# Patient Record
Sex: FEMALE | Race: BLACK OR AFRICAN AMERICAN | NOT HISPANIC OR LATINO | Employment: FULL TIME | URBAN - METROPOLITAN AREA
[De-identification: names, ages, dates, MRNs, and addresses within clinical notes are randomized per-mention and may not be internally consistent; named-entity substitution may affect disease eponyms.]

---

## 2019-10-02 ENCOUNTER — OFFICE VISIT (OUTPATIENT)
Dept: FAMILY MEDICINE CLINIC | Facility: CLINIC | Age: 59
End: 2019-10-02

## 2019-10-02 VITALS
HEIGHT: 59 IN | OXYGEN SATURATION: 99 % | BODY MASS INDEX: 39.43 KG/M2 | WEIGHT: 195.6 LBS | SYSTOLIC BLOOD PRESSURE: 138 MMHG | DIASTOLIC BLOOD PRESSURE: 78 MMHG | TEMPERATURE: 97.8 F | HEART RATE: 64 BPM

## 2019-10-02 DIAGNOSIS — E11.9 TYPE 2 DIABETES MELLITUS WITHOUT COMPLICATION, WITHOUT LONG-TERM CURRENT USE OF INSULIN (HCC): Primary | ICD-10-CM

## 2019-10-02 DIAGNOSIS — E78.5 HYPERLIPIDEMIA, UNSPECIFIED HYPERLIPIDEMIA TYPE: ICD-10-CM

## 2019-10-02 DIAGNOSIS — E66.9 CLASS 2 OBESITY WITHOUT SERIOUS COMORBIDITY WITH BODY MASS INDEX (BMI) OF 39.0 TO 39.9 IN ADULT, UNSPECIFIED OBESITY TYPE: ICD-10-CM

## 2019-10-02 DIAGNOSIS — Z12.31 VISIT FOR SCREENING MAMMOGRAM: ICD-10-CM

## 2019-10-02 DIAGNOSIS — I10 ESSENTIAL HYPERTENSION: ICD-10-CM

## 2019-10-02 PROCEDURE — 99214 OFFICE O/P EST MOD 30 MIN: CPT | Performed by: INTERNAL MEDICINE

## 2019-10-02 RX ORDER — METOPROLOL TARTRATE 50 MG/1
50 TABLET, FILM COATED ORAL 2 TIMES DAILY
Refills: 3 | COMMUNITY
Start: 2019-08-19 | End: 2019-11-21 | Stop reason: SDUPTHER

## 2019-10-02 NOTE — PROGRESS NOTES
Subjective   Kimberli Magallanes is a 58 y.o. female.     Chief Complaint   Patient presents with   • Diabetes   • Hypertension       History of Present Illness   Here for follow-up of diabetes.  Patient states to have been compliant with medications.  Blood sugar monitoring - patient states has been running on average 130.  With a range of 90 - 141.  No episodes of hypoglycemia, nausea, vomiting, new rashes, syncope or other issues.  Denies any difficulties with the current medication regimen of no medications and off the toujeo for 3 months without difficulty.  Follow-up for cholesterol.  Currently has been feeling well without any myalgias, muscle aches, weakness, numbness, chest pain, short of breath or other issues.  Currently is adherent with medication regimen and denies medication side effects. Is due for lab follow-up.  Follow-up for hypertension.  Currently has been feeling well and asymptomatic without any headaches,vision changes, cough, chest pain, shortness of breath, swelling, focal neurologic deficit, memory loss or syncope.  Has been taking the medications regularly and adherent with the regimen, Denies medication side effects and no significant interval events.    No GERD symptoms and no nexium in over 3 weeks.    The following portions of the patient's history were reviewed and updated as appropriate: allergies, current medications, past family history, past medical history, past social history, past surgical history and problem list.    Past Medical History:   Diagnosis Date   • Abnormal electrocardiogram    • Adverse reaction to ACE inhibitor drug    • Adverse reaction to angiotensin 2 receptor antagonist    • BMI 40.0-44.9, adult (CMS/[facility])    • Encounter for annual health examination    • Encounter for hepatitis C screening test for low risk patient    • Esophageal reflux    • Foot deformity, acquired, right    • Glucosuria    • Hematuria, microscopic    • Hyperlipidemia    • Hypertension    • Refused  influenza vaccine    • Visit for screening mammogram        Past Surgical History:   Procedure Laterality Date   • BREAST LUMPECTOMY Left 2019    benign calcification   • COLONOSCOPY      16 normal, repeat in 10 years       Family History   Problem Relation Age of Onset   • Heart attack Mother    • Stroke Mother    • Hypertension Mother    • Diabetes Father    • Hypertension Father    • No Known Problems Other        Social History     Socioeconomic History   • Marital status: Single     Spouse name: Not on file   • Number of children: Not on file   • Years of education: Not on file   • Highest education level: Not on file   Tobacco Use   • Smoking status: Former Smoker     Last attempt to quit: 2012     Years since quittin.7   • Smokeless tobacco: Never Used   • Tobacco comment: None since    Substance and Sexual Activity   • Alcohol use: Yes     Frequency: Monthly or less     Drinks per session: 1 or 2     Binge frequency: Less than monthly   • Drug use: No   • Sexual activity: Defer       Review of Systems   Constitutional: Negative for activity change, appetite change, fatigue, fever, unexpected weight gain and unexpected weight loss.   HENT: Negative for nosebleeds, rhinorrhea, trouble swallowing and voice change.    Eyes: Negative for visual disturbance.   Respiratory: Negative for cough, chest tightness, shortness of breath and wheezing.    Cardiovascular: Negative for chest pain, palpitations and leg swelling.   Gastrointestinal: Negative for abdominal pain, blood in stool, constipation, diarrhea, nausea, vomiting, GERD and indigestion.   Genitourinary: Negative for dysuria, frequency and hematuria.   Musculoskeletal: Negative for arthralgias, back pain and myalgias.   Skin: Negative for rash and bruise.   Neurological: Negative for dizziness, tremors, weakness, light-headedness, numbness, headache and memory problem.   Hematological: Negative for adenopathy. Does not bruise/bleed easily.    Psychiatric/Behavioral: Negative for sleep disturbance and depressed mood. The patient is not nervous/anxious.        Objective   Vitals:    10/02/19 1639   BP: 138/78   Pulse: 64   Temp: 97.8 °F (36.6 °C)   SpO2: 99%     Body mass index is 39.51 kg/m².  Physical Exam   Constitutional: She is oriented to person, place, and time. She appears well-developed and well-nourished. No distress.   HENT:   Head: Normocephalic and atraumatic.   Right Ear: External ear normal.   Left Ear: External ear normal.   Nose: Nose normal.   Mouth/Throat: Oropharynx is clear and moist.   Eyes: Conjunctivae and EOM are normal. Pupils are equal, round, and reactive to light.   Neck: Normal range of motion. Neck supple. No tracheal deviation present. No thyromegaly present.   Cardiovascular: Normal rate, regular rhythm, normal heart sounds and intact distal pulses. Exam reveals no gallop and no friction rub.   No murmur heard.  Pulmonary/Chest: Effort normal and breath sounds normal. No respiratory distress.   Abdominal: Soft. Bowel sounds are normal. She exhibits no mass. There is no tenderness. There is no guarding.   Musculoskeletal: Normal range of motion. She exhibits no edema.   Lymphadenopathy:     She has no cervical adenopathy.   Neurological: She is alert and oriented to person, place, and time. She displays normal reflexes. She exhibits normal muscle tone.   Skin: Skin is warm and dry. Capillary refill takes less than 2 seconds. No rash noted. She is not diaphoretic.   Psychiatric: She has a normal mood and affect. Her behavior is normal. Judgment and thought content normal.   Nursing note and vitals reviewed.      No results found for this or any previous visit (from the past 2016 hour(s)).  Assessment/Plan   Kimberli was seen today for diabetes and hypertension.    Diagnoses and all orders for this visit:    Type 2 diabetes mellitus without complication, without long-term current use of insulin (CMS/[facility])  -     Mammo Screening  Bilateral With CAD  -     Comprehensive Metabolic Panel  -     Hemoglobin A1c  -     Microalbumin / Creatinine Urine Ratio - Urine, Clean Catch    Essential hypertension  -     Mammo Screening Bilateral With CAD  -     Comprehensive Metabolic Panel    Hyperlipidemia, unspecified hyperlipidemia type  -     Mammo Screening Bilateral With CAD  -     Comprehensive Metabolic Panel    Visit for screening mammogram  -     Mammo Screening Bilateral With CAD    Class 2 obesity without serious comorbidity with body mass index (BMI) of 39.0 to 39.9 in adult, unspecified obesity type      Reviewed history and medications which are very few.  She seems to be doing quite well with her home blood sugars at this time.  We will await her test results to determine if she needs to continue with the current no regimen or if we need to add any oral medications.  Patient is agreeable to having a mammogram screening repeat post lumpectomy with a benign calcification of the left breast.  Encourage diet exercise and weight loss.

## 2019-10-02 NOTE — PATIENT INSTRUCTIONS
Exercising to Lose Weight  Exercise is structured, repetitive physical activity to improve fitness and health. Getting regular exercise is important for everyone. It is especially important if you are overweight. Being overweight increases your risk of heart disease, stroke, diabetes, high blood pressure, and several types of cancer. Reducing your calorie intake and exercising can help you lose weight.  Exercise is usually categorized as moderate or vigorous intensity. To lose weight, most people need to do a certain amount of moderate-intensity or vigorous-intensity exercise each week.  Moderate-intensity exercise    Moderate-intensity exercise is any activity that gets you moving enough to burn at least three times more energy (calories) than if you were sitting.  Examples of moderate exercise include:  · Walking a mile in 15 minutes.  · Doing light yard work.  · Biking at an easy pace.  Most people should get at least 150 minutes (2 hours and 30 minutes) a week of moderate-intensity exercise to maintain their body weight.  Vigorous-intensity exercise  Vigorous-intensity exercise is any activity that gets you moving enough to burn at least six times more calories than if you were sitting. When you exercise at this intensity, you should be working hard enough that you are not able to carry on a conversation.  Examples of vigorous exercise include:  · Running.  · Playing a team sport, such as football, basketball, and soccer.  · Jumping rope.  Most people should get at least 75 minutes (1 hour and 15 minutes) a week of vigorous-intensity exercise to maintain their body weight.  How can exercise affect me?  When you exercise enough to burn more calories than you eat, you lose weight. Exercise also reduces body fat and builds muscle. The more muscle you have, the more calories you burn. Exercise also:  · Improves mood.  · Reduces stress and tension.  · Improves your overall fitness, flexibility, and  endurance.  · Increases bone strength.  The amount of exercise you need to lose weight depends on:  · Your age.  · The type of exercise.  · Any health conditions you have.  · Your overall physical ability.  Talk to your health care provider about how much exercise you need and what types of activities are safe for you.  What actions can I take to lose weight?  Nutrition    · Make changes to your diet as told by your health care provider or diet and nutrition specialist (dietitian). This may include:  ? Eating fewer calories.  ? Eating more protein.  ? Eating less unhealthy fats.  ? Eating a diet that includes fresh fruits and vegetables, whole grains, low-fat dairy products, and lean protein.  ? Avoiding foods with added fat, salt, and sugar.  · Drink plenty of water while you exercise to prevent dehydration or heat stroke.  Activity  · Choose an activity that you enjoy and set realistic goals. Your health care provider can help you make an exercise plan that works for you.  · Exercise at a moderate or vigorous intensity most days of the week.  ? The intensity of exercise may vary from person to person. You can tell how intense a workout is for you by paying attention to your breathing and heartbeat. Most people will notice their breathing and heartbeat get faster with more intense exercise.  · Do resistance training twice each week, such as:  ? Push-ups.  ? Sit-ups.  ? Lifting weights.  ? Using resistance bands.  · Getting short amounts of exercise can be just as helpful as long structured periods of exercise. If you have trouble finding time to exercise, try to include exercise in your daily routine.  ? Get up, stretch, and walk around every 30 minutes throughout the day.  ? Go for a walk during your lunch break.  ? Park your car farther away from your destination.  ? If you take public transportation, get off one stop early and walk the rest of the way.  ? Make phone calls while standing up and walking  around.  ? Take the stairs instead of elevators or escalators.  · Wear comfortable clothes and shoes with good support.  · Do not exercise so much that you hurt yourself, feel dizzy, or get very short of breath.  Where to find more information  · [place] Department of Health and Human Services: www.hhs.gov  · Centers for Disease Control and Prevention (CDC): www.cdc.gov  Contact a health care provider:  · Before starting a new exercise program.  · If you have questions or concerns about your weight.  · If you have a medical problem that keeps you from exercising.  Get help right away if you have any of the following while exercising:  · Injury.  · Dizziness.  · Difficulty breathing or shortness of breath that does not go away when you stop exercising.  · Chest pain.  · Rapid heartbeat.  Summary  · Being overweight increases your risk of heart disease, stroke, diabetes, high blood pressure, and several types of cancer.  · Losing weight happens when you burn more calories than you eat.  · Reducing the amount of calories you eat in addition to getting regular moderate or vigorous exercise each week helps you lose weight.  This information is not intended to replace advice given to you by your health care provider. Make sure you discuss any questions you have with your health care provider.  Document Released: 01/20/2012 Document Revised: 12/31/2018 Document Reviewed: 12/31/2018  [employer] Interactive Patient Education © 2019 [employer]      Calorie Counting for Weight Loss  Calories are units of energy. Your body needs a certain amount of calories from food to keep you going throughout the day. When you eat more calories than your body needs, your body stores the extra calories as fat. When you eat fewer calories than your body needs, your body burns fat to get the energy it needs.  Calorie counting means keeping track of how many calories you eat and drink each day. Calorie counting can be helpful if you need to lose  weight. If you make sure to eat fewer calories than your body needs, you should lose weight. Ask your health care provider what a healthy weight is for you.  For calorie counting to work, you will need to eat the right number of calories in a day in order to lose a healthy amount of weight per week. A dietitian can help you determine how many calories you need in a day and will give you suggestions on how to reach your calorie goal.  · A healthy amount of weight to lose per week is usually 1-2 lb (0.5-0.9 kg). This usually means that your daily calorie intake should be reduced by 500-750 calories.  · Eating 1,200 - 1,500 calories per day can help most women lose weight.  · Eating 1,500 - 1,800 calories per day can help most men lose weight.  What is my plan?  My goal is to have __________ calories per day.  If I have this many calories per day, I should lose around __________ pounds per week.  What do I need to know about calorie counting?  In order to meet your daily calorie goal, you will need to:  · Find out how many calories are in each food you would like to eat. Try to do this before you eat.  · Decide how much of the food you plan to eat.  · Write down what you ate and how many calories it had. Doing this is called keeping a food log.  To successfully lose weight, it is important to balance calorie counting with a healthy lifestyle that includes regular activity. Aim for 150 minutes of moderate exercise (such as walking) or 75 minutes of vigorous exercise (such as running) each week.  Where do I find calorie information?    The number of calories in a food can be found on a Nutrition Facts label. If a food does not have a Nutrition Facts label, try to look up the calories online or ask your dietitian for help.  Remember that calories are listed per serving. If you choose to have more than one serving of a food, you will have to multiply the calories per serving by the amount of servings you plan to eat. For  "example, the label on a package of bread might say that a serving size is 1 slice and that there are 90 calories in a serving. If you eat 1 slice, you will have eaten 90 calories. If you eat 2 slices, you will have eaten 180 calories.  How do I keep a food log?  Immediately after each meal, record the following information in your food log:  · What you ate. Don't forget to include toppings, sauces, and other extras on the food.  · How much you ate. This can be measured in cups, ounces, or number of items.  · How many calories each food and drink had.  · The total number of calories in the meal.  Keep your food log near you, such as in a small notebook in your pocket, or use a mobile spencer or website. Some programs will calculate calories for you and show you how many calories you have left for the day to meet your goal.  What are some calorie counting tips?    · Use your calories on foods and drinks that will fill you up and not leave you hungry:  ? Some examples of foods that fill you up are nuts and nut butters, vegetables, lean proteins, and high-fiber foods like whole grains. High-fiber foods are foods with more than 5 g fiber per serving.  ? Drinks such as sodas, specialty coffee drinks, alcohol, and juices have a lot of calories, yet do not fill you up.  · Eat nutritious foods and avoid empty calories. Empty calories are calories you get from foods or beverages that do not have many vitamins or protein, such as candy, sweets, and soda. It is better to have a nutritious high-calorie food (such as an avocado) than a food with few nutrients (such as a bag of chips).  · Know how many calories are in the foods you eat most often. This will help you calculate calorie counts faster.  · Pay attention to calories in drinks. Low-calorie drinks include water and unsweetened drinks.  · Pay attention to nutrition labels for \"low fat\" or \"fat free\" foods. These foods sometimes have the same amount of calories or more calories " than the full fat versions. They also often have added sugar, starch, or salt, to make up for flavor that was removed with the fat.  · Find a way of tracking calories that works for you. Get creative. Try different apps or programs if writing down calories does not work for you.  What are some portion control tips?  · Know how many calories are in a serving. This will help you know how many servings of a certain food you can have.  · Use a measuring cup to measure serving sizes. You could also try weighing out portions on a kitchen scale. With time, you will be able to estimate serving sizes for some foods.  · Take some time to put servings of different foods on your favorite plates, bowls, and cups so you know what a serving looks like.  · Try not to eat straight from a bag or box. Doing this can lead to overeating. Put the amount you would like to eat in a cup or on a plate to make sure you are eating the right portion.  · Use smaller plates, glasses, and bowls to prevent overeating.  · Try not to multitask (for example, watch TV or use your computer) while eating. If it is time to eat, sit down at a table and enjoy your food. This will help you to know when you are full. It will also help you to be aware of what you are eating and how much you are eating.  What are tips for following this plan?  Reading food labels  · Check the calorie count compared to the serving size. The serving size may be smaller than what you are used to eating.  · Check the source of the calories. Make sure the food you are eating is high in vitamins and protein and low in saturated and trans fats.  Shopping  · Read nutrition labels while you shop. This will help you make healthy decisions before you decide to purchase your food.  · Make a grocery list and stick to it.  Cooking  · Try to cook your favorite foods in a healthier way. For example, try baking instead of frying.  · Use low-fat dairy products.  Meal planning  · Use more fruits  and vegetables. Half of your plate should be fruits and vegetables.  · Include lean proteins like poultry and fish.  How do I count calories when eating out?  · Ask for smaller portion sizes.  · Consider sharing an entree and sides instead of getting your own entree.  · If you get your own entree, eat only half. Ask for a box at the beginning of your meal and put the rest of your entree in it so you are not tempted to eat it.  · If calories are listed on the menu, choose the lower calorie options.  · Choose dishes that include vegetables, fruits, whole grains, low-fat dairy products, and lean protein.  · Choose items that are boiled, broiled, grilled, or steamed. Stay away from items that are buttered, battered, fried, or served with cream sauce. Items labeled “crispy” are usually fried, unless stated otherwise.  · Choose water, low-fat milk, unsweetened iced tea, or other drinks without added sugar. If you want an alcoholic beverage, choose a lower calorie option such as a glass of wine or light beer.  · Ask for dressings, sauces, and syrups on the side. These are usually high in calories, so you should limit the amount you eat.  · If you want a salad, choose a garden salad and ask for grilled meats. Avoid extra toppings like ding, cheese, or fried items. Ask for the dressing on the side, or ask for olive oil and vinegar or lemon to use as dressing.  · Estimate how many servings of a food you are given. For example, a serving of cooked rice is ½ cup or about the size of half a baseball. Knowing serving sizes will help you be aware of how much food you are eating at restaurants. The list below tells you how big or small some common portion sizes are based on everyday objects:  ? 1 oz--4 stacked dice.  ? 3 oz--1 deck of cards.  ? 1 tsp--1 die.  ? 1 Tbsp--½ a ping-pong ball.  ? 2 Tbsp--1 ping-pong ball.  ? ½ cup--½ baseball.  ? 1 cup--1 baseball.  Summary  · Calorie counting means keeping track of how many calories  you eat and drink each day. If you eat fewer calories than your body needs, you should lose weight.  · A healthy amount of weight to lose per week is usually 1-2 lb (0.5-0.9 kg). This usually means reducing your daily calorie intake by 500-750 calories.  · The number of calories in a food can be found on a Nutrition Facts label. If a food does not have a Nutrition Facts label, try to look up the calories online or ask your dietitian for help.  · Use your calories on foods and drinks that will fill you up, and not on foods and drinks that will leave you hungry.  · Use smaller plates, glasses, and bowls to prevent overeating.  This information is not intended to replace advice given to you by your health care provider. Make sure you discuss any questions you have with your health care provider.  Document Released: 12/18/2006 Document Revised: 11/17/2017 Document Reviewed: 11/17/2017  [employer] Interactive Patient Education © 2019 [employer]

## 2019-10-08 LAB
ALBUMIN SERPL-MCNC: 4.8 G/DL (ref 3.5–5.2)
ALBUMIN/CREAT UR: 41.6 MG/G CREAT (ref 0–30)
ALBUMIN/GLOB SERPL: 1.8 G/DL
ALP SERPL-CCNC: 85 U/L (ref 39–117)
ALT SERPL-CCNC: 12 U/L (ref 1–33)
AST SERPL-CCNC: 15 U/L (ref 1–32)
BILIRUB SERPL-MCNC: 0.2 MG/DL (ref 0.2–1.2)
BUN SERPL-MCNC: 11 MG/DL (ref 6–20)
BUN/CREAT SERPL: 13.6 (ref 7–25)
CALCIUM SERPL-MCNC: 9.9 MG/DL (ref 8.6–10.5)
CHLORIDE SERPL-SCNC: 103 MMOL/L (ref 98–107)
CO2 SERPL-SCNC: 26.8 MMOL/L (ref 22–29)
CREAT SERPL-MCNC: 0.81 MG/DL (ref 0.57–1)
CREAT UR-MCNC: 29.6 MG/DL
GLOBULIN SER CALC-MCNC: 2.7 GM/DL
GLUCOSE SERPL-MCNC: 117 MG/DL (ref 65–99)
HBA1C MFR BLD: 6.9 % (ref 4.8–5.6)
MICROALBUMIN UR-MCNC: 12.3 UG/ML
POTASSIUM SERPL-SCNC: 4.2 MMOL/L (ref 3.5–5.2)
PROT SERPL-MCNC: 7.5 G/DL (ref 6–8.5)
SODIUM SERPL-SCNC: 143 MMOL/L (ref 136–145)

## 2019-11-21 DIAGNOSIS — I10 ESSENTIAL HYPERTENSION: Primary | ICD-10-CM

## 2019-11-21 RX ORDER — METOPROLOL TARTRATE 50 MG/1
50 TABLET, FILM COATED ORAL 2 TIMES DAILY
Qty: 90 TABLET | Refills: 0 | Status: SHIPPED | OUTPATIENT
Start: 2019-11-21 | End: 2020-01-21 | Stop reason: ALTCHOICE

## 2019-11-21 RX ORDER — METOPROLOL TARTRATE 50 MG/1
TABLET, FILM COATED ORAL
COMMUNITY
Start: 2019-10-01 | End: 2020-01-21 | Stop reason: ALTCHOICE

## 2020-01-21 ENCOUNTER — OFFICE VISIT (OUTPATIENT)
Dept: FAMILY MEDICINE CLINIC | Facility: CLINIC | Age: 60
End: 2020-01-21

## 2020-01-21 VITALS
DIASTOLIC BLOOD PRESSURE: 82 MMHG | HEART RATE: 64 BPM | HEIGHT: 59 IN | OXYGEN SATURATION: 98 % | BODY MASS INDEX: 38.91 KG/M2 | SYSTOLIC BLOOD PRESSURE: 134 MMHG | WEIGHT: 193 LBS

## 2020-01-21 DIAGNOSIS — I10 ESSENTIAL HYPERTENSION: ICD-10-CM

## 2020-01-21 DIAGNOSIS — Z09 HOSPITAL DISCHARGE FOLLOW-UP: Primary | ICD-10-CM

## 2020-01-21 DIAGNOSIS — Z98.61 S/P PTCA (PERCUTANEOUS TRANSLUMINAL CORONARY ANGIOPLASTY): ICD-10-CM

## 2020-01-21 DIAGNOSIS — I21.4 NSTEMI (NON-ST ELEVATED MYOCARDIAL INFARCTION) (HCC): ICD-10-CM

## 2020-01-21 DIAGNOSIS — K21.9 GASTROESOPHAGEAL REFLUX DISEASE WITHOUT ESOPHAGITIS: ICD-10-CM

## 2020-01-21 DIAGNOSIS — E78.5 HYPERLIPIDEMIA, UNSPECIFIED HYPERLIPIDEMIA TYPE: ICD-10-CM

## 2020-01-21 PROBLEM — R07.89 ATYPICAL CHEST PAIN: Status: ACTIVE | Noted: 2020-01-15

## 2020-01-21 PROCEDURE — 99214 OFFICE O/P EST MOD 30 MIN: CPT | Performed by: INTERNAL MEDICINE

## 2020-01-21 RX ORDER — ATORVASTATIN CALCIUM 40 MG/1
40 TABLET, FILM COATED ORAL DAILY
COMMUNITY
Start: 2020-01-16 | End: 2020-04-07 | Stop reason: SDUPTHER

## 2020-01-21 RX ORDER — NITROGLYCERIN 0.4 MG/1
0.4 TABLET SUBLINGUAL
COMMUNITY
Start: 2020-01-16 | End: 2020-04-07 | Stop reason: SDUPTHER

## 2020-01-21 RX ORDER — PRASUGREL 10 MG/1
10 TABLET, FILM COATED ORAL DAILY
COMMUNITY
Start: 2020-01-16 | End: 2020-04-07 | Stop reason: SDUPTHER

## 2020-01-21 RX ORDER — ASPIRIN 81 MG
81 TABLET,CHEWABLE ORAL DAILY
COMMUNITY
Start: 2020-01-16 | End: 2020-04-07 | Stop reason: SDUPTHER

## 2020-01-21 RX ORDER — AMLODIPINE BESYLATE 5 MG/1
5 TABLET ORAL DAILY
COMMUNITY
Start: 2020-01-16 | End: 2020-04-07 | Stop reason: SDUPTHER

## 2020-01-21 NOTE — PROGRESS NOTES
"Subjective   Kimberli Magallanes is a 59 y.o. female.     Chief Complaint   Patient presents with   • stent     hospital f/u       History of Present Illness   Within 48 business hours after discharge our office contacted her via telephone to coordinate her care and needs.  No flowsheet data found.  Risk for Readmission (LACE) No data recorded    Patient was admitted to [facility]  ALL records were obtained and reviewed and /or discussed with admitting physician  Date of admission 1/15/2020  Date of discharge 1/16/2020  Diagnosis NSTEMI, Hypertension, status post PTCA to the proximal first diagonal branch of the LAD x1 on 1/15/2020  Hospital Course: She ruled in for non-STEMI. She was taken to the catheter lab and underwent successful LCIFTON PCI to the proximal first diagonal branch of the LAD ×1. She tolerated the procedure well and without complications. Her medications have been adjusted for better blood pressure control. She is without further chest pain or shortness of breath. She is hemodynamically stable and will be discharged to home today on the medications as listed below. She is on dual antiplatelet therapy with aspirin and Effient. She is also on beta blocker, calcium channel blocker and statin. Follow-up with PCP in 1-2 weeks. She can return to work in 1 week. She should start cardiac rehabilitation in 2 weeks. Follow-up with Dr. Montes at the completion of cardiac rehabilitation in 3 months, sooner if needed.  Patient had difficulty understanding the cardiologist and reasons for the stent and medications then was told by cardiologist to \"just talk to Brandin\" PMD.  Medications upon discharge amlodipine 5 mg daily, aspirin 81 mg daily, atorvastatin 40 mg daily, Effient 10 mg QD for a year, nitroglycerin 0.4 mg PRN chest pain.  Continued metoprolol tartrate.  Disposition Home  Follow up Cardiac rehab and cardiology Dr Montes  Currently c/o feeling \"great\" and following low fat healthy heart diet.  Did " have a very brief episode of chest pain yesterday when under a lot of stress at work.  No further episodes, short of breath, dizziness, weakness, numbness or jaw pain.  Patient has been having indigestion and heartburn with bloating in the abdomen.  Was taking nexium as needed OTC that does help.  Condition stable    I reviewed and requested records labs and diagnostics from [facility] with the patient and family.  The patient is to follow-up with specialist as discussed and directed.  If any problems arise or further questions develop patient is to call or to contact us for any needs.    The following portions of the patient's history were reviewed and updated as appropriate: allergies, current medications, past family history, past medical history, past social history, past surgical history and problem list.    Past Medical History:   Diagnosis Date   • Abnormal electrocardiogram    • Adverse reaction to ACE inhibitor drug    • Adverse reaction to angiotensin 2 receptor antagonist    • BMI 40.0-44.9, adult (CMS/[facility])    • Encounter for annual health examination    • Encounter for hepatitis C screening test for low risk patient    • Esophageal reflux    • Foot deformity, acquired, right    • Glucosuria    • Hematuria, microscopic    • Hyperlipidemia    • Hypertension    • Refused influenza vaccine    • Visit for screening mammogram        Past Surgical History:   Procedure Laterality Date   • BREAST LUMPECTOMY Left 02/14/2019    benign calcification   • COLONOSCOPY      5/27/16 normal, repeat in 10 years       Family History   Problem Relation Age of Onset   • Heart attack Mother    • Stroke Mother    • Hypertension Mother    • Diabetes Father    • Hypertension Father    • No Known Problems Other        Social History     Socioeconomic History   • Marital status: Single     Spouse name: Not on file   • Number of children: Not on file   • Years of education: Not on file   • Highest education level: Not on file    Tobacco Use   • Smoking status: Former Smoker     Last attempt to quit: 2012     Years since quittin.0   • Smokeless tobacco: Never Used   • Tobacco comment: None since    Substance and Sexual Activity   • Alcohol use: Yes     Frequency: Monthly or less     Drinks per session: 1 or 2     Binge frequency: Less than monthly   • Drug use: No   • Sexual activity: Defer       Current Outpatient Medications   Medication Sig Dispense Refill   • amLODIPine (NORVASC) 5 MG tablet 5 mg.     • ASPIRIN LOW DOSE 81 MG chewable tablet 81 mg.     • atorvastatin (LIPITOR) 40 MG tablet 40 mg.     • metoprolol tartrate (LOPRESSOR) 25 MG tablet Take 25 mg by mouth 2 (Two) Times a Day.     • nitroglycerin (NITROSTAT) 0.4 MG SL tablet 0.4 mg.     • prasugrel (EFFIENT) 10 MG tablet 10 mg.     • esomeprazole (nexIUM 24HR) 20 MG capsule Take 1 capsule by mouth Every Morning Before Breakfast. 30 capsule 6     No current facility-administered medications for this visit.        Review of Systems   Constitutional: Negative for activity change, appetite change, fatigue, fever, unexpected weight gain and unexpected weight loss.   HENT: Negative for nosebleeds, rhinorrhea, trouble swallowing and voice change.    Eyes: Negative for visual disturbance.   Respiratory: Negative for cough, chest tightness, shortness of breath and wheezing.    Cardiovascular: Negative for chest pain, palpitations and leg swelling.   Gastrointestinal: Negative for abdominal pain, blood in stool, constipation, diarrhea, nausea, vomiting, GERD and indigestion.   Genitourinary: Negative for dysuria, frequency and hematuria.   Musculoskeletal: Negative for arthralgias, back pain and myalgias.   Skin: Negative for rash and bruise.   Neurological: Negative for dizziness, tremors, weakness, light-headedness, numbness, headache and memory problem.   Hematological: Negative for adenopathy. Does not bruise/bleed easily.   Psychiatric/Behavioral: Negative for sleep  disturbance and depressed mood. The patient is not nervous/anxious.        Objective   Vitals:    01/21/20 1046   BP: 134/82   Pulse: 64   SpO2: 98%     Body mass index is 38.96 kg/m².  Physical Exam   Constitutional: She is oriented to person, place, and time. She appears well-developed and well-nourished. No distress.   HENT:   Head: Normocephalic and atraumatic.   Right Ear: External ear normal.   Left Ear: External ear normal.   Nose: Nose normal.   Mouth/Throat: Oropharynx is clear and moist.   Eyes: Pupils are equal, round, and reactive to light. Conjunctivae and EOM are normal.   Neck: Normal range of motion. Neck supple. No tracheal deviation present. No thyromegaly present.   Cardiovascular: Normal rate, regular rhythm, normal heart sounds and intact distal pulses. Exam reveals no gallop and no friction rub.   No murmur heard.  Pulmonary/Chest: Effort normal and breath sounds normal. No respiratory distress.   Abdominal: Soft. Bowel sounds are normal. She exhibits no mass. There is no tenderness. There is no guarding.   Musculoskeletal: Normal range of motion. She exhibits no edema.    Kimberli had a diabetic foot exam performed today.   During the foot exam she had a monofilament test performed (normal).  Vascular Status -  Her right foot exhibits normal foot vasculature  and no edema. Her left foot exhibits normal foot vasculature  and no edema.  Skin Integrity  -  Her right foot skin is intact.Her left foot skin is intact..  Lymphadenopathy:     She has no cervical adenopathy.   Neurological: She is alert and oriented to person, place, and time. She displays normal reflexes. She exhibits normal muscle tone.   Skin: Skin is warm and dry. Capillary refill takes less than 2 seconds. No rash noted. She is not diaphoretic.   Psychiatric: She has a normal mood and affect. Her behavior is normal. Judgment and thought content normal.   Nursing note and vitals reviewed.        Assessment/Plan   Kimberli was seen  today for stent.    Diagnoses and all orders for this visit:    Hospital discharge follow-up    NSTEMI (non-ST elevated myocardial infarction) (CMS/[facility])    S/P PTCA (percutaneous transluminal coronary angioplasty)    Essential hypertension    Hyperlipidemia, unspecified hyperlipidemia type    Gastroesophageal reflux disease without esophagitis  -     esomeprazole (nexIUM 24HR) 20 MG capsule; Take 1 capsule by mouth Every Morning Before Breakfast.    Patient to follow up and do the cardiac rehab.  Continue the current medications unchanged at this time.  Patient is unable to take ACE-I or ARBs secondary to past issues.  Stay on the aspirin and effient dual therapy and the atorvastatin and metoprolol.  Will follow up and check the labs in 1-2 months.  Recommend taking the OTC nexium daily.

## 2020-01-29 DIAGNOSIS — Z98.61 S/P PTCA (PERCUTANEOUS TRANSLUMINAL CORONARY ANGIOPLASTY): ICD-10-CM

## 2020-01-29 DIAGNOSIS — I21.4 NSTEMI (NON-ST ELEVATED MYOCARDIAL INFARCTION) (HCC): Primary | ICD-10-CM

## 2020-02-05 ENCOUNTER — OFFICE VISIT (OUTPATIENT)
Dept: CARDIOLOGY | Facility: CLINIC | Age: 60
End: 2020-02-05

## 2020-02-05 VITALS
SYSTOLIC BLOOD PRESSURE: 118 MMHG | BODY MASS INDEX: 38.91 KG/M2 | WEIGHT: 193 LBS | HEIGHT: 59 IN | HEART RATE: 58 BPM | DIASTOLIC BLOOD PRESSURE: 80 MMHG

## 2020-02-05 DIAGNOSIS — I25.10 CORONARY ARTERY DISEASE INVOLVING NATIVE CORONARY ARTERY OF NATIVE HEART WITHOUT ANGINA PECTORIS: ICD-10-CM

## 2020-02-05 DIAGNOSIS — Z98.61 S/P PTCA (PERCUTANEOUS TRANSLUMINAL CORONARY ANGIOPLASTY): Primary | ICD-10-CM

## 2020-02-05 DIAGNOSIS — I10 ESSENTIAL HYPERTENSION: ICD-10-CM

## 2020-02-05 DIAGNOSIS — E11.59 TYPE 2 DIABETES MELLITUS WITH OTHER CIRCULATORY COMPLICATION, WITHOUT LONG-TERM CURRENT USE OF INSULIN (HCC): ICD-10-CM

## 2020-02-05 PROCEDURE — 99204 OFFICE O/P NEW MOD 45 MIN: CPT | Performed by: INTERNAL MEDICINE

## 2020-02-05 PROCEDURE — 93000 ELECTROCARDIOGRAM COMPLETE: CPT | Performed by: INTERNAL MEDICINE

## 2020-02-05 NOTE — PROGRESS NOTES
Kimberli Magallanes  1960  Date of Office Visit: 02/05/20  Encounter Provider: Lex Morales MD  Place of Service: [facility] CARDIOLOGY      CHIEF COMPLAINT:  Coronary artery disease    HISTORY OF PRESENT ILLNESS:A 59-year-old female with a medical history of coronary artery disease, non-ST elevation MI, diabetes mellitus, mild hyperlipidemia, and hypertension along with angioedema with ACE inhibitor who presents to me as a transfer of care. She was recently, in 01/2020, in the hospital at [facility] with chest discomfort and a NSTEMI. Her troponin peaked based on the last measurement of about 0.7. She was noted to have a very proximal 1st diagonal versus ramus with an 80% stenosis that was stented with an [place] 2.5 x 18 mm drug-eluting stent and postdilated to 2.75. There was mild mid LAD disease, about 30%. The right coronary was nondominant. Her ejection fraction on ventriculogram was normal but she did not have an echocardiogram. She did very well and subsequently has been discharged. Her blood pressure and heart rate are well controlled. She has not started cardiac rehab.        Review of Systems   Constitution: Negative for fever and malaise/fatigue.   HENT: Negative for nosebleeds and sore throat.    Eyes: Negative for blurred vision and double vision.   Cardiovascular: Negative for chest pain, claudication, palpitations and syncope.   Respiratory: Negative for cough, shortness of breath and snoring.    Endocrine: Negative for cold intolerance, heat intolerance and polydipsia.   Skin: Negative for itching, poor wound healing and rash.   Musculoskeletal: Negative for joint pain, joint swelling, muscle weakness and myalgias.   Gastrointestinal: Negative for abdominal pain, melena, nausea and vomiting.   Neurological: Negative for light-headedness, loss of balance, seizures, vertigo and weakness.   Psychiatric/Behavioral: Negative for altered mental status and depression.     "    Past Medical History:   Diagnosis Date   • Abnormal electrocardiogram    • Adverse reaction to ACE inhibitor drug    • Adverse reaction to angiotensin 2 receptor antagonist    • BMI 40.0-44.9, adult (CMS/[facility])    • Diabetes mellitus (CMS/HCC)     Diet Controlled   • Encounter for annual health examination    • Encounter for hepatitis C screening test for low risk patient    • Esophageal reflux    • Foot deformity, acquired, right    • Glucosuria    • Hematuria, microscopic    • Hyperlipidemia    • Hypertension    • Refused influenza vaccine    • Visit for screening mammogram        The following portions of the patient's history were reviewed and updated as appropriate: Social history , Family history and Surgical history     Current Outpatient Medications on File Prior to Visit   Medication Sig Dispense Refill   • amLODIPine (NORVASC) 5 MG tablet Take 5 mg by mouth Daily.     • ASPIRIN LOW DOSE 81 MG chewable tablet Chew 81 mg Daily.     • atorvastatin (LIPITOR) 40 MG tablet Take 40 mg by mouth Daily.     • esomeprazole (nexIUM 24HR) 20 MG capsule Take 1 capsule by mouth Every Morning Before Breakfast. 30 capsule 6   • metoprolol tartrate (LOPRESSOR) 25 MG tablet Take 25 mg by mouth 2 (Two) Times a Day.     • nitroglycerin (NITROSTAT) 0.4 MG SL tablet Place 0.4 mg under the tongue Every 5 (Five) Minutes As Needed.     • prasugrel (EFFIENT) 10 MG tablet Take 10 mg by mouth Daily.       No current facility-administered medications on file prior to visit.        Allergies   Allergen Reactions   • Lisinopril Anaphylaxis and Swelling   • Losartan Anaphylaxis and Swelling   • Citrullus Vulgaris Unknown - Low Severity   • Eggs Or Egg-Derived Products Unknown - Low Severity       Vitals:    02/05/20 1149   BP: 118/80   Pulse: 58   Weight: 87.5 kg (193 lb)   Height: 149.9 cm (59\")     Physical Exam   Constitutional: She is oriented to person, place, and time. She appears well-developed and well-nourished.   HENT:   Head: " Normocephalic and atraumatic.   Eyes: Conjunctivae and EOM are normal. No scleral icterus.   Neck: Normal range of motion. Neck supple. Normal carotid pulses, no hepatojugular reflux and no JVD present. Carotid bruit is not present. No tracheal deviation present. No thyromegaly present.   Cardiovascular: Normal rate and regular rhythm. Exam reveals no gallop and no friction rub.   No murmur heard.  Pulses:       Carotid pulses are 2+ on the right side, and 2+ on the left side.       Radial pulses are 2+ on the right side, and 2+ on the left side.        Femoral pulses are 2+ on the right side, and 2+ on the left side.       Dorsalis pedis pulses are 2+ on the right side, and 2+ on the left side.        Posterior tibial pulses are 2+ on the right side, and 2+ on the left side.   Pulmonary/Chest: Breath sounds normal. No respiratory distress. She has no decreased breath sounds. She has no wheezes. She has no rhonchi. She has no rales. She exhibits no tenderness.   Abdominal: Soft. Bowel sounds are normal. She exhibits no distension. There is no tenderness. There is no rebound.   Musculoskeletal: She exhibits no edema or deformity.   Neurological: She is alert and oriented to person, place, and time. She has normal strength. No sensory deficit.   Skin: No rash noted. No erythema.   Psychiatric: She has a normal mood and affect. Her behavior is normal.       Lab Results   Component Value Date    WBC 11.15 (H) 01/16/2020    HGB 14.9 01/16/2020    HCT 45.8 01/16/2020    MCV 90.7 01/16/2020     01/16/2020       Lab Results   Component Value Date    GLUCOSE 115 (H) 06/09/2014    BUN 14 01/16/2020    CREATININE 0.7 01/16/2020    EGFRIFNONA 73 10/07/2019    EGFRIFAFRI 88 10/07/2019    BCR 20.0 01/16/2020    K 4.0 01/16/2020    CO2 21 (L) 01/16/2020    CALCIUM 9.4 01/16/2020    PROTENTOTREF 7.5 10/07/2019    ALBUMIN 4.4 01/15/2020    LABIL2 1.5 01/15/2020    AST 22 01/15/2020    ALT 19 01/15/2020       Lab Results    Component Value Date    GLUCOSE 115 (H) 06/09/2014    CALCIUM 9.4 01/16/2020     01/16/2020    K 4.0 01/16/2020    CO2 21 (L) 01/16/2020     (H) 01/16/2020    BUN 14 01/16/2020    CREATININE 0.7 01/16/2020    EGFRIFAFRI 88 10/07/2019    EGFRIFNONA 73 10/07/2019    BCR 20.0 01/16/2020       Lab Results   Component Value Date    CHLPL 154 01/16/2020    TRIG 71 01/16/2020    HDL 50 (L) 01/16/2020    LDL 90 01/16/2020         ECG 12 Lead  Date/Time: 2/5/2020 12:47 PM  Performed by: Lex Morales MD  Authorized by: Lex Morales MD   Comparison: compared with previous ECG from 1/15/2020  Rhythm: sinus rhythm  Rate: normal  QRS axis: normal    Clinical impression: normal ECG                  DISCUSSION/SUMMARY  Very pleasant 59-year-old female with a medical history of recent non-ST elevation MI, coronary artery disease with PCI to the diagonal, hyperlipidemia that is mild, hypertension with prior angioedema with ACE inhibitor presents to me as a transfer of care.    She has been doing very well since her non-ST elevation MI. She does not have chest pain with activity.  Her blood pressure and heart rate are well controlled.    1.  Non-ST elevation MI, recent. Coronary artery disease with PCI to the diagonal.   -  Continue aspirin and prasugrel at this time. I will plan on getting her off of the prasugrel in a year.   -  Continue Lopressor and atorvastatin at the current dose. She does have mild myalgias with the atorvastatin. I told                 her to call me in another week or two if it continues and we will try to move her off of that onto something a little                 more mild.  2.  Essential hypertension, at goal. Continue metoprolol tartrate therapy.  3.  Hyperlipidemia: Continue atorvastatin therapy for now and call me as above if her myalgias do not improve.    I will get her set up with an echocardiogram and plan on touching base with her after that.

## 2020-02-19 ENCOUNTER — HOSPITAL ENCOUNTER (OUTPATIENT)
Dept: CARDIOLOGY | Facility: HOSPITAL | Age: 60
Discharge: HOME | End: 2020-02-19
Admitting: INTERNAL MEDICINE

## 2020-02-19 ENCOUNTER — TELEPHONE (OUTPATIENT)
Dept: CARDIOLOGY | Facility: CLINIC | Age: 60
End: 2020-02-19

## 2020-02-19 ENCOUNTER — OFFICE VISIT (OUTPATIENT)
Dept: CARDIAC REHAB | Facility: HOSPITAL | Age: 60
End: 2020-02-19

## 2020-02-19 VITALS
WEIGHT: 193 LBS | SYSTOLIC BLOOD PRESSURE: 110 MMHG | BODY MASS INDEX: 38.91 KG/M2 | OXYGEN SATURATION: 99 % | HEIGHT: 59 IN | HEART RATE: 68 BPM | DIASTOLIC BLOOD PRESSURE: 80 MMHG

## 2020-02-19 DIAGNOSIS — I25.10 CORONARY ARTERY DISEASE INVOLVING NATIVE CORONARY ARTERY OF NATIVE HEART WITHOUT ANGINA PECTORIS: ICD-10-CM

## 2020-02-19 DIAGNOSIS — I21.4 NSTEMI (NON-ST ELEVATED MYOCARDIAL INFARCTION) (HCC): Primary | ICD-10-CM

## 2020-02-19 DIAGNOSIS — Z95.5 STATUS POST INSERTION OF DRUG ELUTING CORONARY ARTERY STENT: ICD-10-CM

## 2020-02-19 DIAGNOSIS — Z98.61 S/P PTCA (PERCUTANEOUS TRANSLUMINAL CORONARY ANGIOPLASTY): ICD-10-CM

## 2020-02-19 LAB
AORTIC ARCH: 2 CM
AORTIC ROOT ANNULUS: 2 CM
ASCENDING AORTA: 3.2 CM
BH CV ECHO MEAS - ACS: 1.9 CM
BH CV ECHO MEAS - AO MAX PG (FULL): 1.2 MMHG
BH CV ECHO MEAS - AO MAX PG: 5.7 MMHG
BH CV ECHO MEAS - AO MEAN PG (FULL): 1 MMHG
BH CV ECHO MEAS - AO MEAN PG: 3.2 MMHG
BH CV ECHO MEAS - AO ROOT AREA (BSA CORRECTED): 1.6
BH CV ECHO MEAS - AO ROOT AREA: 6.3 CM^2
BH CV ECHO MEAS - AO ROOT DIAM: 2.8 CM
BH CV ECHO MEAS - AO V2 MAX: 119 CM/SEC
BH CV ECHO MEAS - AO V2 MEAN: 85 CM/SEC
BH CV ECHO MEAS - AO V2 VTI: 25.8 CM
BH CV ECHO MEAS - ASC AORTA: 3.2 CM
BH CV ECHO MEAS - AVA(I,A): 2.6 CM^2
BH CV ECHO MEAS - AVA(I,D): 2.6 CM^2
BH CV ECHO MEAS - AVA(V,A): 2.7 CM^2
BH CV ECHO MEAS - AVA(V,D): 2.7 CM^2
BH CV ECHO MEAS - BSA(HAYCOCK): 2 M^2
BH CV ECHO MEAS - BSA: 1.8 M^2
BH CV ECHO MEAS - BZI_BMI: 39 KILOGRAMS/M^2
BH CV ECHO MEAS - BZI_METRIC_HEIGHT: 149.9 CM
BH CV ECHO MEAS - BZI_METRIC_WEIGHT: 87.5 KG
BH CV ECHO MEAS - EDV(MOD-SP2): 31 ML
BH CV ECHO MEAS - EDV(MOD-SP4): 41 ML
BH CV ECHO MEAS - EDV(TEICH): 51 ML
BH CV ECHO MEAS - EF(CUBED): 64.4 %
BH CV ECHO MEAS - EF(MOD-BP): 62 %
BH CV ECHO MEAS - EF(MOD-SP2): 58.1 %
BH CV ECHO MEAS - EF(MOD-SP4): 65.9 %
BH CV ECHO MEAS - EF(TEICH): 57 %
BH CV ECHO MEAS - ESV(MOD-SP2): 13 ML
BH CV ECHO MEAS - ESV(MOD-SP4): 14 ML
BH CV ECHO MEAS - ESV(TEICH): 21.9 ML
BH CV ECHO MEAS - FS: 29.1 %
BH CV ECHO MEAS - IVS/LVPW: 1
BH CV ECHO MEAS - IVSD: 1.3 CM
BH CV ECHO MEAS - LAT PEAK E' VEL: 6 CM/SEC
BH CV ECHO MEAS - LV DIASTOLIC VOL/BSA (35-75): 22.6 ML/M^2
BH CV ECHO MEAS - LV MASS(C)D: 145.6 GRAMS
BH CV ECHO MEAS - LV MASS(C)DI: 80.2 GRAMS/M^2
BH CV ECHO MEAS - LV MAX PG: 4.4 MMHG
BH CV ECHO MEAS - LV MEAN PG: 2.2 MMHG
BH CV ECHO MEAS - LV SYSTOLIC VOL/BSA (12-30): 7.7 ML/M^2
BH CV ECHO MEAS - LV V1 MAX: 105.3 CM/SEC
BH CV ECHO MEAS - LV V1 MEAN: 70.7 CM/SEC
BH CV ECHO MEAS - LV V1 VTI: 22.2 CM
BH CV ECHO MEAS - LVIDD: 3.5 CM
BH CV ECHO MEAS - LVIDS: 2.5 CM
BH CV ECHO MEAS - LVLD AP2: 6 CM
BH CV ECHO MEAS - LVLD AP4: 6.2 CM
BH CV ECHO MEAS - LVLS AP2: 4.8 CM
BH CV ECHO MEAS - LVLS AP4: 4.8 CM
BH CV ECHO MEAS - LVOT AREA (M): 3.1 CM^2
BH CV ECHO MEAS - LVOT AREA: 3 CM^2
BH CV ECHO MEAS - LVOT DIAM: 2 CM
BH CV ECHO MEAS - LVPWD: 1.2 CM
BH CV ECHO MEAS - MED PEAK E' VEL: 5 CM/SEC
BH CV ECHO MEAS - MV A DUR: 0.08 SEC
BH CV ECHO MEAS - MV A MAX VEL: 98 CM/SEC
BH CV ECHO MEAS - MV DEC SLOPE: 408.5 CM/SEC^2
BH CV ECHO MEAS - MV DEC TIME: 0.17 SEC
BH CV ECHO MEAS - MV E MAX VEL: 37.3 CM/SEC
BH CV ECHO MEAS - MV E/A: 0.38
BH CV ECHO MEAS - MV MAX PG: 4.6 MMHG
BH CV ECHO MEAS - MV MEAN PG: 1.7 MMHG
BH CV ECHO MEAS - MV P1/2T MAX VEL: 69.5 CM/SEC
BH CV ECHO MEAS - MV P1/2T: 49.8 MSEC
BH CV ECHO MEAS - MV V2 MAX: 106.9 CM/SEC
BH CV ECHO MEAS - MV V2 MEAN: 61 CM/SEC
BH CV ECHO MEAS - MV V2 VTI: 33.8 CM
BH CV ECHO MEAS - MVA P1/2T LCG: 3.2 CM^2
BH CV ECHO MEAS - MVA(P1/2T): 4.4 CM^2
BH CV ECHO MEAS - MVA(VTI): 2 CM^2
BH CV ECHO MEAS - PA ACC TIME: 0.08 SEC
BH CV ECHO MEAS - PA MAX PG (FULL): 1.3 MMHG
BH CV ECHO MEAS - PA MAX PG: 2.7 MMHG
BH CV ECHO MEAS - PA PR(ACCEL): 43.3 MMHG
BH CV ECHO MEAS - PA V2 MAX: 82.8 CM/SEC
BH CV ECHO MEAS - PULM A REVS DUR: 0.08 SEC
BH CV ECHO MEAS - PULM A REVS VEL: 35.6 CM/SEC
BH CV ECHO MEAS - PULM DIAS VEL: 32.1 CM/SEC
BH CV ECHO MEAS - PULM S/D: 1.7
BH CV ECHO MEAS - PULM SYS VEL: 55.3 CM/SEC
BH CV ECHO MEAS - PVA(V,A): 1.9 CM^2
BH CV ECHO MEAS - PVA(V,D): 1.9 CM^2
BH CV ECHO MEAS - QP/QS: 0.44
BH CV ECHO MEAS - RAP SYSTOLE: 3 MMHG
BH CV ECHO MEAS - RV MAX PG: 1.5 MMHG
BH CV ECHO MEAS - RV MEAN PG: 0.89 MMHG
BH CV ECHO MEAS - RV V1 MAX: 60.8 CM/SEC
BH CV ECHO MEAS - RV V1 MEAN: 44.5 CM/SEC
BH CV ECHO MEAS - RV V1 VTI: 11.2 CM
BH CV ECHO MEAS - RVOT AREA: 2.6 CM^2
BH CV ECHO MEAS - RVOT DIAM: 1.8 CM
BH CV ECHO MEAS - RVSP: 22 MMHG
BH CV ECHO MEAS - SI(AO): 89 ML/M^2
BH CV ECHO MEAS - SI(CUBED): 15.3 ML/M^2
BH CV ECHO MEAS - SI(LVOT): 36.8 ML/M^2
BH CV ECHO MEAS - SI(MOD-SP2): 9.9 ML/M^2
BH CV ECHO MEAS - SI(MOD-SP4): 14.9 ML/M^2
BH CV ECHO MEAS - SI(TEICH): 16 ML/M^2
BH CV ECHO MEAS - SUP REN AO DIAM: 2.3 CM
BH CV ECHO MEAS - SV(AO): 161.6 ML
BH CV ECHO MEAS - SV(CUBED): 27.7 ML
BH CV ECHO MEAS - SV(LVOT): 66.8 ML
BH CV ECHO MEAS - SV(MOD-SP2): 18 ML
BH CV ECHO MEAS - SV(MOD-SP4): 27 ML
BH CV ECHO MEAS - SV(RVOT): 29.4 ML
BH CV ECHO MEAS - SV(TEICH): 29.1 ML
BH CV ECHO MEAS - TAPSE (>1.6): 2.3 CM2
BH CV ECHO MEAS - TR MAX VEL: 215.8 CM/SEC
BH CV ECHO MEASUREMENTS AVERAGE E/E' RATIO: 6.78
BH CV VAS BP RIGHT ARM: NORMAL MMHG
BH CV XLRA - RV BASE: 2.8 CM
BH CV XLRA - RV LENGTH: 6.2 CM
BH CV XLRA - RV MID: 2.3 CM
BH CV XLRA - TDI S': 12 CM/SEC
LEFT ATRIUM VOLUME INDEX: 13 ML/M2
LV EF 2D ECHO EST: 62 %
SINUS: 2.5 CM
STJ: 2.9 CM

## 2020-02-19 PROCEDURE — 93356 MYOCRD STRAIN IMG SPCKL TRCK: CPT | Performed by: INTERNAL MEDICINE

## 2020-02-19 PROCEDURE — 93356 MYOCRD STRAIN IMG SPCKL TRCK: CPT

## 2020-02-19 PROCEDURE — 93306 TTE W/DOPPLER COMPLETE: CPT

## 2020-02-19 PROCEDURE — 93306 TTE W/DOPPLER COMPLETE: CPT | Performed by: INTERNAL MEDICINE

## 2020-02-19 PROCEDURE — 93797 PHYS/QHP OP CAR RHAB WO ECG: CPT

## 2020-02-19 NOTE — PROGRESS NOTES
Cardiac Rehab Initial Assessment      Name: Kimberli Magallanes  :1960 Allergies:Lisinopril; Losartan; Citrullus vulgaris; and Eggs or egg-derived products   MRN: [id number] 59 y.o. Physician: Brandin Khalil MD   Primary Diagnosis:    Diagnosis Plan   1. NSTEMI (non-ST elevated myocardial infarction) (CMS/HCC)     2. Status post insertion of drug eluting coronary artery stent      Event Date: 1/15/20 Specialist: Dr. Morales   Secondary Diagnosis:  Risk Stratification:High Risk Note Author: Angella Fair RN     Cardiovascular History: Comments none     EXERCISE AT HOME  Yes walking 1-2 miles per day  60min- leisurely  2 days per week    EF: 60%      Source: clinical summary 2020          Ambulatory Status:Independent  Ambulatory Fall Risk Assessed on Initial Visit: yes 6 Minute Walk Pre- Cardiac Rehab:  Distance:1018 ft      RPE:2  Max. HR: 89      SPO2: 96-98%    MET: 2.5  MPH: 1.9             RPD: 0  Resting BP: 112/72 LA, 118/68 RA    Peak BP: 132/84  Recovery BP: 122/78  Comments: tolerated well     NUTRITION  Lipids:yes If yes, labs as follows;  Total: 154  HDL:   HDL Cholesterol   Date Value Ref Range Status   2020 50 (L) >50 mg/dL Final    Lipids continued:  LDL:  LDL Cholesterol    Date Value Ref Range Status   2020 90 0 - 100 mg/dL Final     Comment:       LDL Cholesterol Reference Range  <100     Optimal  100-129  Near optimal/above optimal  130-159  Borderline high  160-189  High  >=190    Very high     Triglyceride: 71   Weight Management:                 Weight: 188.8  Height: 59                                   BMI: 38.1  Waist Circumference: 40  inches   Alcohol Use: none Diabetes:Yes,  Monitors BS at home- yes, Frequency: 2 times daily, Random BS: 170    Last HGBA1C with date if applicable: 6.90 10/7/2019         SOCIAL HISTORY  Social History     Socioeconomic History   • Marital status: Single     Spouse name: Not on file   • Number of children: Not on file   • Years of  education: Not on file   • Highest education level: Not on file   Tobacco Use   • Smoking status: Former Smoker     Packs/day: 0.25     Years: 10.00     Pack years: 2.50     Types: Cigarettes     Last attempt to quit: 2012     Years since quittin.1   • Smokeless tobacco: Never Used   • Tobacco comment: None since    Substance and Sexual Activity   • Alcohol use: Yes     Alcohol/week: 0.0 - 2.0 standard drinks     Frequency: Monthly or less     Drinks per session: 1 or 2     Binge frequency: Less than monthly     Comment: social   • Drug use: No   • Sexual activity: Not Currently     Birth control/protection: Post-menopausal       Educational Level (choose one that applies) college graduate Learning Barriers:Ready to Learn    Family Support:yes    Living Arrangement: lives alone    Risk Factors: Stress  Yes, Clinical Depression  Yes, Heredity  Yes If Yes grandmothers mother, Hyperlipidemia  No, Diabetes  Yes If Yes: Do you check blood glucose daily  Yes Today's glucose level 170, Exercise prior to event  Yes If Yes: Activity walking, Minutes per day60, Days per week 2 and Obesity  Yes     Tobacco Adjunct: No   quit       Comorbidities: Diabetes Mellitus     PSYCHOSOCIAL  Clinical Depression: yes, states her PCP is aware, but doesn't feel she needs medication at this time.    Stress: yes     Assess presence or absence of depression using a valid screening tool: yes      PHYSICAL ASSESSMENT  Influenza vaccine: no  Pneumococcal vaccine: yes          Angina: yes    Describe angina scale of 0 - 4: 1 = light    Today are you having incisional pain? N/A. If, Yes, Scale: n/a        Today are you having any other pain? Yes. If, Yes, Scale: 5 back pain, takes tylenol for it, standing a lot makes it worse.     Diagnosed with Hypertension:yes    Heart Sounds: s1s2, normal rate and rhythm     Lung Sounds: normal air entry, lungs clear to auscultation         Assessment: alert and oriented  Orthopedic Problems: none      Are you being hurt, hit, or frightened by anyone at home or in your life? no    Are you being neglected by a caregiver? N/A Shoulder flexibility/Range of motion: Excellent     Recommended arm activity: Any    Chair sit and reach within: 0 inches   Leg flexibility: Excellent    Leg Strength/Balance/Five times sit to stand: 19 seconds.     Chose one: Average    Recommended stretching: Standing    Assessment:     Family attends [place]: no Time of arrival: 1430  Time of departure: 1530     Patient Goals: MET goal- 4-5. Develop exercise plan of 150 minutes per week, learn more about stress management, control blood sugars better, learn more about heart healthy, diabetic diet.         2/19/2020  2:30 PM  Angella Fair RN

## 2020-02-19 NOTE — TELEPHONE ENCOUNTER
----- Message from Lex Morales MD sent at 2/19/2020  5:27 PM EST -----  Please tell her that her echo looks fine.  Normal strength.  No valve problems.

## 2020-02-21 ENCOUNTER — TREATMENT (OUTPATIENT)
Dept: CARDIAC REHAB | Facility: HOSPITAL | Age: 60
End: 2020-02-21

## 2020-02-21 DIAGNOSIS — I21.4 NSTEMI (NON-ST ELEVATED MYOCARDIAL INFARCTION) (HCC): Primary | ICD-10-CM

## 2020-02-21 PROCEDURE — 93798 PHYS/QHP OP CAR RHAB W/ECG: CPT

## 2020-02-24 ENCOUNTER — TREATMENT (OUTPATIENT)
Dept: CARDIAC REHAB | Facility: HOSPITAL | Age: 60
End: 2020-02-24

## 2020-02-24 DIAGNOSIS — I21.4 NSTEMI (NON-ST ELEVATED MYOCARDIAL INFARCTION) (HCC): Primary | ICD-10-CM

## 2020-02-24 PROCEDURE — 93798 PHYS/QHP OP CAR RHAB W/ECG: CPT

## 2020-02-26 ENCOUNTER — TREATMENT (OUTPATIENT)
Dept: CARDIAC REHAB | Facility: HOSPITAL | Age: 60
End: 2020-02-26

## 2020-02-26 DIAGNOSIS — I21.4 NSTEMI (NON-ST ELEVATED MYOCARDIAL INFARCTION) (HCC): Primary | ICD-10-CM

## 2020-02-26 PROCEDURE — 93798 PHYS/QHP OP CAR RHAB W/ECG: CPT

## 2020-02-28 ENCOUNTER — TREATMENT (OUTPATIENT)
Dept: CARDIAC REHAB | Facility: HOSPITAL | Age: 60
End: 2020-02-28

## 2020-02-28 DIAGNOSIS — I21.4 NSTEMI (NON-ST ELEVATED MYOCARDIAL INFARCTION) (HCC): Primary | ICD-10-CM

## 2020-02-28 PROCEDURE — 93798 PHYS/QHP OP CAR RHAB W/ECG: CPT

## 2020-03-02 ENCOUNTER — TREATMENT (OUTPATIENT)
Dept: CARDIAC REHAB | Facility: HOSPITAL | Age: 60
End: 2020-03-02

## 2020-03-02 DIAGNOSIS — I21.4 NSTEMI (NON-ST ELEVATED MYOCARDIAL INFARCTION) (HCC): Primary | ICD-10-CM

## 2020-03-02 PROCEDURE — 93798 PHYS/QHP OP CAR RHAB W/ECG: CPT

## 2020-03-04 ENCOUNTER — TREATMENT (OUTPATIENT)
Dept: CARDIAC REHAB | Facility: HOSPITAL | Age: 60
End: 2020-03-04

## 2020-03-04 DIAGNOSIS — I21.4 NSTEMI (NON-ST ELEVATED MYOCARDIAL INFARCTION) (HCC): Primary | ICD-10-CM

## 2020-03-04 DIAGNOSIS — Z95.5 STATUS POST INSERTION OF DRUG ELUTING CORONARY ARTERY STENT: ICD-10-CM

## 2020-03-04 PROCEDURE — 93798 PHYS/QHP OP CAR RHAB W/ECG: CPT

## 2020-03-09 ENCOUNTER — TREATMENT (OUTPATIENT)
Dept: CARDIAC REHAB | Facility: HOSPITAL | Age: 60
End: 2020-03-09

## 2020-03-09 DIAGNOSIS — I21.4 NSTEMI (NON-ST ELEVATED MYOCARDIAL INFARCTION) (HCC): Primary | ICD-10-CM

## 2020-03-09 PROCEDURE — 93798 PHYS/QHP OP CAR RHAB W/ECG: CPT

## 2020-03-11 ENCOUNTER — TREATMENT (OUTPATIENT)
Dept: CARDIAC REHAB | Facility: HOSPITAL | Age: 60
End: 2020-03-11

## 2020-03-11 DIAGNOSIS — I21.4 NSTEMI (NON-ST ELEVATED MYOCARDIAL INFARCTION) (HCC): Primary | ICD-10-CM

## 2020-03-11 PROCEDURE — 93798 PHYS/QHP OP CAR RHAB W/ECG: CPT

## 2020-03-13 ENCOUNTER — TREATMENT (OUTPATIENT)
Dept: CARDIAC REHAB | Facility: HOSPITAL | Age: 60
End: 2020-03-13

## 2020-03-13 DIAGNOSIS — I21.4 NSTEMI (NON-ST ELEVATED MYOCARDIAL INFARCTION) (HCC): Primary | ICD-10-CM

## 2020-03-13 PROCEDURE — 93798 PHYS/QHP OP CAR RHAB W/ECG: CPT

## 2020-04-07 ENCOUNTER — OFFICE VISIT (OUTPATIENT)
Dept: FAMILY MEDICINE CLINIC | Facility: CLINIC | Age: 60
End: 2020-04-07

## 2020-04-07 DIAGNOSIS — R07.89 ATYPICAL CHEST PAIN: ICD-10-CM

## 2020-04-07 DIAGNOSIS — I10 ESSENTIAL HYPERTENSION: Primary | ICD-10-CM

## 2020-04-07 DIAGNOSIS — E78.5 HYPERLIPIDEMIA, UNSPECIFIED HYPERLIPIDEMIA TYPE: ICD-10-CM

## 2020-04-07 DIAGNOSIS — F41.9 ANXIETY: ICD-10-CM

## 2020-04-07 DIAGNOSIS — I25.119 CORONARY ARTERY DISEASE INVOLVING NATIVE CORONARY ARTERY OF NATIVE HEART WITH ANGINA PECTORIS (HCC): ICD-10-CM

## 2020-04-07 DIAGNOSIS — E11.59 TYPE 2 DIABETES MELLITUS WITH OTHER CIRCULATORY COMPLICATION, WITHOUT LONG-TERM CURRENT USE OF INSULIN (HCC): ICD-10-CM

## 2020-04-07 PROCEDURE — 99443 PR PHYS/QHP TELEPHONE EVALUATION 21-30 MIN: CPT | Performed by: INTERNAL MEDICINE

## 2020-04-07 RX ORDER — METOPROLOL TARTRATE 50 MG/1
50 TABLET, FILM COATED ORAL 2 TIMES DAILY
COMMUNITY
Start: 2020-03-14 | End: 2020-04-07 | Stop reason: SDUPTHER

## 2020-04-07 RX ORDER — AMLODIPINE BESYLATE 5 MG/1
5 TABLET ORAL DAILY
Qty: 90 TABLET | Refills: 1 | Status: SHIPPED | OUTPATIENT
Start: 2020-04-07 | End: 2020-06-05 | Stop reason: SDUPTHER

## 2020-04-07 RX ORDER — ASPIRIN 81 MG
81 TABLET,CHEWABLE ORAL DAILY
Qty: 90 TABLET | Refills: 0 | Status: SHIPPED | OUTPATIENT
Start: 2020-04-07

## 2020-04-07 RX ORDER — ATORVASTATIN CALCIUM 40 MG/1
40 TABLET, FILM COATED ORAL DAILY
Qty: 90 TABLET | Refills: 1 | Status: SHIPPED | OUTPATIENT
Start: 2020-04-07 | End: 2020-09-11

## 2020-04-07 RX ORDER — METOPROLOL TARTRATE 50 MG/1
50 TABLET, FILM COATED ORAL 2 TIMES DAILY
Qty: 180 TABLET | Refills: 1 | Status: SHIPPED | OUTPATIENT
Start: 2020-04-07 | End: 2020-06-05 | Stop reason: SDUPTHER

## 2020-04-07 RX ORDER — NITROGLYCERIN 0.4 MG/1
0.4 TABLET SUBLINGUAL
Qty: 30 TABLET | Refills: 0 | Status: SHIPPED | OUTPATIENT
Start: 2020-04-07 | End: 2022-04-20 | Stop reason: SDUPTHER

## 2020-04-07 RX ORDER — PRASUGREL 10 MG/1
10 TABLET, FILM COATED ORAL DAILY
Qty: 90 TABLET | Refills: 1 | Status: SHIPPED | OUTPATIENT
Start: 2020-04-07 | End: 2020-09-11

## 2020-04-07 NOTE — PROGRESS NOTES
Subjective   Kimberli Magallanes is a 59 y.o. female.     Chief Complaint   Patient presents with   • Diabetes   • Hypertension   • Hyperlipidemia       History of Present Illness   Patient visit completed by telephone visit.  No access to video/computer/tablet or smartphone connection at this time and during social distancing for COVID-19 pandemic.    Here for follow-up of diabetes.  Patient states to have been compliant with medications.  Blood sugar monitoring - patient states has been running on average 130.  With a range of 92 - 168.  No episodes of hypoglycemia, nausea, vomiting, new rashes, syncope or other issues.  Denies any difficulties with the current medication regimen of no medication na diet controlled only.  Last A1c 10/7/19 and less than 7 at that time    Follow-up for hypertension.  Currently, has been feeling well and asymptomatic without any headaches,vision changes, cough, chest pain, shortness of breath, swelling, focal neurologic deficit, memory loss or syncope.  Has been taking the medications regularly and adherent with the regimen of amlodipine 5 mg and metoprolol tartrate 50 mg BID.  Denies medication side effects and no significant interval events.  Home blood pressure has been 122//90 but most of the time less than 140/90.      Follow-up for cholesterol.  Currently has been feeling well without any myalgias, muscle aches, weakness, numbness, chest pain, short of breath or other issues.  Currently is adherent with medication regimen of atorvastatin 40 mg and denies medication side effects.  Last labs done 1/16/2020.    Patient states that for the last 2 months she has been having anxiety when she watches things about the COVID19 pandemic or thinks about it.  During this time she has some mild chest discomfort and takes the nitroglycerin.  Episodes only last a few minutes and resolve.  Not associated with other activity or exercise.  Refuses to be seen or to ER during the pandemic.  Next OV  with cardiology Dr Morales is in August.    The following portions of the patient's history were reviewed and updated as appropriate: allergies, current medications, past family history, past medical history, past social history, past surgical history and problem list.    Depression Screen:  No flowsheet data found.    Past Medical History:   Diagnosis Date   • Abnormal electrocardiogram    • Adverse reaction to ACE inhibitor drug    • Adverse reaction to angiotensin 2 receptor antagonist    • BMI 40.0-44.9, adult (CMS/[facility])    • Diabetes mellitus (CMS/[facility])     Diet Controlled   • Encounter for annual health examination    • Encounter for hepatitis C screening test for low risk patient    • Esophageal reflux    • Foot deformity, acquired, right    • Glucosuria    • Hematuria, microscopic    • Hyperlipidemia    • Hypertension    • Refused influenza vaccine    • Visit for screening mammogram        Past Surgical History:   Procedure Laterality Date   • BREAST LUMPECTOMY Left 2019    benign calcification   • CARDIAC CATHETERIZATION      x1 stent LAD at [facility]   • COLONOSCOPY      16 normal, repeat in 10 years   • CORONARY STENT PLACEMENT  01/15/2020       Family History   Problem Relation Age of Onset   • Heart attack Mother 67   • Stroke Mother    • Hypertension Mother    • Heart disease Mother 67   • Diabetes Father    • Hypertension Father    • Heart disease Father 58   • No Known Problems Other    • Heart attack Maternal Grandmother        Social History     Socioeconomic History   • Marital status: Single     Spouse name: Not on file   • Number of children: Not on file   • Years of education: Not on file   • Highest education level: Not on file   Tobacco Use   • Smoking status: Former Smoker     Packs/day: 0.25     Years: 10.00     Pack years: 2.50     Types: Cigarettes     Last attempt to quit:      Years since quittin.2   • Smokeless tobacco: Never Used   • Tobacco comment: None since  2012   Substance and Sexual Activity   • Alcohol use: Yes     Alcohol/week: 0.0 - 2.0 standard drinks     Frequency: Monthly or less     Drinks per session: 1 or 2     Binge frequency: Less than monthly     Comment: social   • Drug use: No   • Sexual activity: Not Currently     Birth control/protection: Post-menopausal       Current Outpatient Medications   Medication Sig Dispense Refill   • amLODIPine (NORVASC) 5 MG tablet Take 1 tablet by mouth Daily. 90 tablet 1   • ASPIRIN LOW DOSE 81 MG chewable tablet Chew 81 mg Daily.     • atorvastatin (LIPITOR) 40 MG tablet Take 1 tablet by mouth Daily. 90 tablet 1   • esomeprazole (nexIUM 24HR) 20 MG capsule Take 1 capsule by mouth Every Morning Before Breakfast. 30 capsule 6   • metoprolol tartrate (LOPRESSOR) 50 MG tablet Take 1 tablet by mouth 2 (Two) Times a Day. 180 tablet 1   • nitroglycerin (NITROSTAT) 0.4 MG SL tablet Place 1 tablet under the tongue Every 5 (Five) Minutes As Needed for Chest Pain. 30 tablet 0   • prasugrel (EFFIENT) 10 MG tablet Take 1 tablet by mouth Daily. 90 tablet 1     No current facility-administered medications for this visit.        Review of Systems   Constitutional: Negative for activity change, appetite change, fatigue, fever, unexpected weight gain and unexpected weight loss.   HENT: Negative for nosebleeds, rhinorrhea, trouble swallowing and voice change.    Eyes: Negative for visual disturbance.   Respiratory: Negative for cough, chest tightness, shortness of breath and wheezing.         Gets stressed over the COVID 19 issues and has some brief chest pain issues.   Cardiovascular: Negative for chest pain, palpitations and leg swelling.   Gastrointestinal: Negative for abdominal pain, blood in stool, constipation, diarrhea, nausea, vomiting, GERD and indigestion.   Genitourinary: Negative for dysuria, frequency and hematuria.   Musculoskeletal: Negative for arthralgias, back pain and myalgias.   Skin: Negative for rash and bruise.    Neurological: Negative for dizziness, tremors, weakness, light-headedness, numbness, headache and memory problem.   Hematological: Negative for adenopathy. Does not bruise/bleed easily.   Psychiatric/Behavioral: Negative for sleep disturbance and depressed mood. The patient is not nervous/anxious.        Objective   There were no vitals taken for this visit.    Physical Exam   Constitutional: She is oriented to person, place, and time.   No physical done secondary to telephone visit only.   Neurological: She is alert and oriented to person, place, and time.   Psychiatric: She has a normal mood and affect. Her behavior is normal. Judgment and thought content normal.       Recent Results (from the past 2016 hour(s))   PROTIME-INR    Collection Time: 01/15/20  3:20 AM   Result Value Ref Range    Protime 13.1 10.3 - 13.3 s    INR 1.2 INR   APTT    Collection Time: 01/15/20  3:20 AM   Result Value Ref Range    PTT 34.3 25.3 - 35.0 s   CBC AND DIFFERENTIAL    Collection Time: 01/15/20  3:20 AM   Result Value Ref Range    WBC 9.11 4.5 - 11.0 10*3/uL    RBC 5.30 (H) 4.0 - 5.2 10*6/uL    Hemoglobin 15.7 12.0 - 16.0 g/dL    Hematocrit 47.8 (H) 36.0 - 46.0 %    MCV 90.2 80.0 - 100.0 fL    MCH 29.6 26.0 - 34.0 pg    MCHC 32.8 31.0 - 37.0 g/dL    RDW 14.6 12.0 - 16.8 %    Platelets 207 140 - 440 10*3/uL    MPV 10.9 (H) 6.7 - 10.8 fL    Differential [facility] CBC w/AutoDiff (arb'U)    Neutrophil Rel % 62.7 45 - 80 %    Lymphocyte Rel % 28.2 15 - 50 %    Monocyte Rel % 6.5 0 - 15 %    Eosinophil % 2.0 0 - 7 %    Basophil Rel % 0.5 0 - 2 %    Immature Grans % 0.1 (H) 0 %    nRBC 0 0 /100(WBC)    Neutrophils Absolute 5.71 2.0 - 8.8 10*3/uL    Lymphocytes Absolute 2.57 0.7 - 5.5 10*3/uL    Monocytes Absolute 0.59 0.0 - 1.7 10*3/uL    Eosinophils Absolute 0.18 0.0 - 0.8 10*3/uL    Basophils Absolute 0.05 0.0 - 0.2 10*3/uL    Immature Grans, Absolute 0.01 <1 10*3/uL   COMPREHENSIVE METABOLIC PANEL    Collection Time: 01/15/20   3:20 AM   Result Value Ref Range    Sodium 142 137 - 145 mmol/L    Potassium 3.7 3.5 - 5.1 mmol/L    Chloride 111 (H) 98 - 107 mmol/L    Total CO2 20 (L) 22 - 30 mmol/L    Glucose 195 (H) 74 - 99 mg/dL    BUN 14 7 - 20 mg/dL    Creatinine 0.7 0.7 - 1.5 mg/dL    BUN/Creatinine Ratio 20.0 RATIO    Est GFR by Clearance >60 >60 /1.73 m2    Total Protein 7.3 6.3 - 8.2 g/dL    Albumin 4.4 3.5 - 5.0 g/dL    Globulin 2.9 1.5 - 4.5 g/dL    A/G Ratio 1.5 1.1 - 2.5 RATIO    Calcium 9.5 8.4 - 10.2 mg/dL    Total Bilirubin 0.3 0.2 - 1.3 mg/dL    AST (SGOT) 22 15 - 46 U/L    ALT (SGPT) 19 0 - 35 U/L    Alkaline Phosphatase 92 38 - 126 U/L   PROBNP (REFERENCE)    Collection Time: 01/15/20  3:20 AM   Result Value Ref Range    .0 (H) <125 pg/mL   TROPONIN (IN-HOUSE)    Collection Time: 01/15/20  3:20 AM   Result Value Ref Range    Troponin I 0.077 (H) 0.000 - 0.034 ng/mL   TROPONIN (IN-HOUSE)    Collection Time: 01/15/20  5:56 AM   Result Value Ref Range    Troponin I 0.608 (C) 0.000 - 0.034 ng/mL   TROPONIN (IN-HOUSE)    Collection Time: 01/15/20  7:37 AM   Result Value Ref Range    Troponin I 0.745 (C) 0.000 - 0.034 ng/mL   CBC AND DIFFERENTIAL    Collection Time: 01/15/20  7:38 AM   Result Value Ref Range    WBC 9.41 4.5 - 11.0 10*3/uL    RBC 5.14 4.0 - 5.2 10*6/uL    Hemoglobin 15.4 12.0 - 16.0 g/dL    Hematocrit 46.4 (H) 36.0 - 46.0 %    MCV 90.3 80.0 - 100.0 fL    MCH 30.0 26.0 - 34.0 pg    MCHC 33.2 31.0 - 37.0 g/dL    RDW 14.9 12.0 - 16.8 %    Platelets 221 140 - 440 10*3/uL    MPV 11.4 (H) 6.7 - 10.8 fL    Differential [facility] CBC w/AutoDiff (arb'U)    Neutrophil Rel % 71.4 45 - 80 %    Lymphocyte Rel % 21.8 15 - 50 %    Monocyte Rel % 5.2 0 - 15 %    Eosinophil % 0.9 0 - 7 %    Basophil Rel % 0.5 0 - 2 %    Immature Grans % 0.2 (H) 0 %    nRBC 0 0 /100(WBC)    Neutrophils Absolute 6.72 2.0 - 8.8 10*3/uL    Lymphocytes Absolute 2.05 0.7 - 5.5 10*3/uL    Monocytes Absolute 0.49 0.0 - 1.7 10*3/uL    Eosinophils  Absolute 0.08 0.0 - 0.8 10*3/uL    Basophils Absolute 0.05 0.0 - 0.2 10*3/uL    Immature Grans, Absolute 0.02 <1 10*3/uL   PROTIME-INR    Collection Time: 01/15/20  7:38 AM   Result Value Ref Range    Protime 13.8 (H) 10.3 - 13.3 s    INR 1.2 INR   APTT    Collection Time: 01/15/20  7:38 AM   Result Value Ref Range    .7 (C) 25.3 - 35.0 s   BASIC METABOLIC PANEL    Collection Time: 01/15/20  7:38 AM   Result Value Ref Range    Sodium 142 137 - 145 mmol/L    Potassium 4.6 3.5 - 5.1 mmol/L    Chloride 111 (H) 98 - 107 mmol/L    Total CO2 22 22 - 30 mmol/L    Glucose 142 (H) 74 - 99 mg/dL    BUN 14 7 - 20 mg/dL    Creatinine 0.6 (L) 0.7 - 1.5 mg/dL    BUN/Creatinine Ratio 23.3 RATIO    Est GFR by Clearance >60 >60 /1.73 m2    Calcium 9.4 8.4 - 10.2 mg/dL   CBC AND DIFFERENTIAL    Collection Time: 01/16/20  6:14 AM   Result Value Ref Range    WBC 11.15 (H) 4.5 - 11.0 10*3/uL    RBC 5.05 4.0 - 5.2 10*6/uL    Hemoglobin 14.9 12.0 - 16.0 g/dL    Hematocrit 45.8 36.0 - 46.0 %    MCV 90.7 80.0 - 100.0 fL    MCH 29.5 26.0 - 34.0 pg    MCHC 32.5 31.0 - 37.0 g/dL    RDW 15.1 12.0 - 16.8 %    Platelets 220 140 - 440 10*3/uL    MPV 11.6 (H) 6.7 - 10.8 fL    Differential [facility] CBC w/AutoDiff (arb'U)    Neutrophil Rel % 66.5 45 - 80 %    Lymphocyte Rel % 25.5 15 - 50 %    Monocyte Rel % 6.7 0 - 15 %    Eosinophil % 0.8 0 - 7 %    Basophil Rel % 0.3 0 - 2 %    Immature Grans % 0.2 (H) 0 %    nRBC 0 0 /100(WBC)    Neutrophils Absolute 7.42 2.0 - 8.8 10*3/uL    Lymphocytes Absolute 2.84 0.7 - 5.5 10*3/uL    Monocytes Absolute 0.75 0.0 - 1.7 10*3/uL    Eosinophils Absolute 0.09 0.0 - 0.8 10*3/uL    Basophils Absolute 0.03 0.0 - 0.2 10*3/uL    Immature Grans, Absolute 0.02 <1 10*3/uL   BASIC METABOLIC PANEL    Collection Time: 01/16/20  6:14 AM   Result Value Ref Range    Sodium 143 137 - 145 mmol/L    Potassium 4.0 3.5 - 5.1 mmol/L    Chloride 111 (H) 98 - 107 mmol/L    Total CO2 21 (L) 22 - 30 mmol/L    Glucose 118 (H)  74 - 99 mg/dL    BUN 14 7 - 20 mg/dL    Creatinine 0.7 0.7 - 1.5 mg/dL    BUN/Creatinine Ratio 20.0 RATIO    Est GFR by Clearance >60 >60 /1.73 m2    Calcium 9.4 8.4 - 10.2 mg/dL   LIPID PANEL    Collection Time: 01/16/20  6:14 AM   Result Value Ref Range    Triglycerides 71 <150 mg/dL    Total Cholesterol 154 0 - 199 mg/dL    HDL Cholesterol 50 (L) >50 mg/dL    LDL Cholesterol  90 0 - 100 mg/dL    VLDL Cholesterol 14 0 - 30 mg/dL    Chol/HDL Ratio 3.1 RATIO    Fasting? Yes    Adult Transthoracic Echo Complete W/ Cont if Necessary Per Protocol    Collection Time: 02/19/20  4:26 PM   Result Value Ref Range    TDI S' 12.00 cm/sec    RV Base 2.80 cm    RV Length 6.20 cm    RV Mid 2.30 cm    Ao root annulus 2.00 cm    Sinus 2.50 cm    STJ 2.90 cm    Ascending aorta 3.20 cm    LA Volume Index 13.0 mL/m2    EF(MOD-bp) 62 %    Lat Peak E' Zaid 6.0 cm/sec    Med Peak E' Zaid 5.00 cm/sec    Abdo Ao Diam 2.30 cm    TAPSE (>1.6) 2.30 cm2    BSA 1.8 m^2    IVSd 1.3 cm    LVIDd 3.5 cm    LVIDs 2.5 cm    LVPWd 1.2 cm    IVS/LVPW 1.0     FS 29.1 %    EDV(Teich) 51.0 ml    ESV(Teich) 21.9 ml    EF(Teich) 57.0 %    EF(cubed) 64.4 %    LV mass(C)d 145.6 grams    LV mass(C)dI 80.2 grams/m^2    SV(Teich) 29.1 ml    SI(Teich) 16.0 ml/m^2    SV(cubed) 27.7 ml    SI(cubed) 15.3 ml/m^2    Ao root diam 2.8 cm    Ao root area 6.3 cm^2    ACS 1.9 cm    asc Aorta Diam 3.2 cm    LVOT diam 2.0 cm    LVOT area 3.0 cm^2    LVOT area(traced) 3.1 cm^2    RVOT diam 1.8 cm    RVOT area 2.6 cm^2    LVLd ap4 6.2 cm    EDV(MOD-sp4) 41.0 ml    LVLs ap4 4.8 cm    ESV(MOD-sp4) 14.0 ml    EF(MOD-sp4) 65.9 %    LVLd ap2 6.0 cm    EDV(MOD-sp2) 31.0 ml    LVLs ap2 4.8 cm    ESV(MOD-sp2) 13.0 ml    EF(MOD-sp2) 58.1 %    SV(MOD-sp4) 27.0 ml    SI(MOD-sp4) 14.9 ml/m^2    SV(MOD-sp2) 18.0 ml    SI(MOD-sp2) 9.9 ml/m^2    Ao root area (BSA corrected) 1.6     LV Brewer Vol (BSA corrected) 22.6 ml/m^2    LV Sys Vol (BSA corrected) 7.7 ml/m^2    MV A dur 0.08 sec    MV E  max zaid 37.3 cm/sec    MV A max zaid 98.0 cm/sec    MV E/A 0.38     MV V2 max 106.9 cm/sec    MV max PG 4.6 mmHg    MV V2 mean 61.0 cm/sec    MV mean PG 1.7 mmHg    MV V2 VTI 33.8 cm    MVA(VTI) 2.0 cm^2    MV P1/2t max zaid 69.5 cm/sec    MV P1/2t 49.8 msec    MVA(P1/2t) 4.4 cm^2    MV dec slope 408.5 cm/sec^2    MV dec time 0.17 sec    Ao pk zaid 119.0 cm/sec    Ao max PG 5.7 mmHg    Ao max PG (full) 1.2 mmHg    Ao V2 mean 85.0 cm/sec    Ao mean PG 3.2 mmHg    Ao mean PG (full) 1.0 mmHg    Ao V2 VTI 25.8 cm    KADY(I,A) 2.6 cm^2    KADY(I,D) 2.6 cm^2    KADY(V,A) 2.7 cm^2    KADY(V,D) 2.7 cm^2    LV V1 max PG 4.4 mmHg    LV V1 mean PG 2.2 mmHg    LV V1 max 105.3 cm/sec    LV V1 mean 70.7 cm/sec    LV V1 VTI 22.2 cm    SV(Ao) 161.6 ml    SI(Ao) 89.0 ml/m^2    SV(LVOT) 66.8 ml    SV(RVOT) 29.4 ml    SI(LVOT) 36.8 ml/m^2    PA V2 max 82.8 cm/sec    PA max PG 2.7 mmHg    PA max PG (full) 1.3 mmHg    BH CV ECHO MARYCRUZ - PVA(V,A) 1.9 cm^2    BH CV ECHO MARYCRUZ - PVA(V,D) 1.9 cm^2    PA acc time 0.08 sec    RV V1 max PG 1.5 mmHg    RV V1 mean PG 0.89 mmHg    RV V1 max 60.8 cm/sec    RV V1 mean 44.5 cm/sec    RV V1 VTI 11.2 cm    TR max zaid 215.8 cm/sec    PA pr(Accel) 43.3 mmHg    Pulm Sys Zaid 55.3 cm/sec    Pulm Brewer Zaid 32.1 cm/sec    Pulm S/D 1.7     Qp/Qs 0.44     Pulm A Revs Dur 0.08 sec    Pulm A Revs Zaid 35.6 cm/sec    MVA P1/2T LCG 3.2 cm^2    BH CV ECHO MARYCRUZ - BZI_BMI 39.0 kilograms/m^2    BH CV ECHO MARYCRUZ - BSA(HAYCOCK) 2.0 m^2    BH CV ECHO MARYCRUZ - BZI_METRIC_WEIGHT 87.5 kg    BH CV ECHO MARYCRUZ - BZI_METRIC_HEIGHT 149.9 cm    Avg E/e' ratio 6.78     BH CV VAS BP RIGHT /80 mmHg    Aortic arch 2.00 cm    RAP systole 3 mmHg    RVSP(TR) 22 mmHg    Echo EF Estimated 62 %     Assessment/Plan   Kimberli was seen today for diabetes, hypertension and hyperlipidemia.    Diagnoses and all orders for this visit:    Essential hypertension  -     amLODIPine (NORVASC) 5 MG tablet; Take 1 tablet by mouth Daily.  -     metoprolol  tartrate (LOPRESSOR) 50 MG tablet; Take 1 tablet by mouth 2 (Two) Times a Day.    Hyperlipidemia, unspecified hyperlipidemia type  -     atorvastatin (LIPITOR) 40 MG tablet; Take 1 tablet by mouth Daily.    Type 2 diabetes mellitus with other circulatory complication, without long-term current use of insulin (CMS/HCC)    Atypical chest pain  -     nitroglycerin (NITROSTAT) 0.4 MG SL tablet; Place 1 tablet under the tongue Every 5 (Five) Minutes As Needed for Chest Pain.    Anxiety    Coronary artery disease involving native coronary artery of native heart with angina pectoris (CMS/HCC)  -     prasugrel (EFFIENT) 10 MG tablet; Take 1 tablet by mouth Daily.        Discussed with patient by phone.  Time spent conversing, discussing and educating patient of 27 minutes.  BP seems to be controlled along with the diabetes.  Will continue the current medication, diet and plan.  Follow up in 3 months in office and will perform labs of A1c, CMP, Lipids at that time.    Dicussed the chest pain episodes that do seem to be anxiety related but recurrence of heart issues cannot be ruled out.  She does not want to go to ER or follow up in office at this time.  I recommend to watch and if increasing, worsened or persisting then to ER.  She does not want any anxiety medications at this time.  All meds refilled to mail order [facility] at this time.

## 2020-05-15 ENCOUNTER — TELEPHONE (OUTPATIENT)
Dept: CARDIOLOGY | Facility: CLINIC | Age: 60
End: 2020-05-15

## 2020-05-15 NOTE — TELEPHONE ENCOUNTER
I would recommend that she take another month before she has this done and at that time she can come off her prasugrel  She will need to stay on aspirin 81 mg daily.  No antibiotics

## 2020-05-15 NOTE — TELEPHONE ENCOUNTER
[phone]  11:42am    Pt called stating she has dentist appointments on 5/21 and 5/27.  She is having scaling and root planning along with an extensive cleaning.  Dentist wants to know if she needs to hold her anticoags and also take a prophylactic antibiotic.  Please advise.    [facility]

## 2020-06-05 ENCOUNTER — OFFICE VISIT (OUTPATIENT)
Dept: FAMILY MEDICINE CLINIC | Facility: CLINIC | Age: 60
End: 2020-06-05

## 2020-06-05 VITALS
BODY MASS INDEX: 40.23 KG/M2 | DIASTOLIC BLOOD PRESSURE: 90 MMHG | HEART RATE: 74 BPM | TEMPERATURE: 97.5 F | SYSTOLIC BLOOD PRESSURE: 140 MMHG | OXYGEN SATURATION: 98 % | WEIGHT: 199.2 LBS

## 2020-06-05 DIAGNOSIS — E78.5 HYPERLIPIDEMIA, UNSPECIFIED HYPERLIPIDEMIA TYPE: ICD-10-CM

## 2020-06-05 DIAGNOSIS — I10 ESSENTIAL HYPERTENSION: ICD-10-CM

## 2020-06-05 DIAGNOSIS — K21.9 GASTROESOPHAGEAL REFLUX DISEASE WITHOUT ESOPHAGITIS: ICD-10-CM

## 2020-06-05 DIAGNOSIS — E11.59 TYPE 2 DIABETES MELLITUS WITH OTHER CIRCULATORY COMPLICATION, WITHOUT LONG-TERM CURRENT USE OF INSULIN (HCC): Primary | ICD-10-CM

## 2020-06-05 PROCEDURE — 99214 OFFICE O/P EST MOD 30 MIN: CPT | Performed by: NURSE PRACTITIONER

## 2020-06-05 RX ORDER — AMLODIPINE BESYLATE 10 MG/1
10 TABLET ORAL DAILY
Qty: 90 TABLET | Refills: 0 | Status: SHIPPED | OUTPATIENT
Start: 2020-06-05 | End: 2020-08-17

## 2020-06-05 RX ORDER — MULTIVIT WITH MINERALS/LUTEIN
250 TABLET ORAL DAILY
COMMUNITY
End: 2021-01-20

## 2020-06-05 RX ORDER — METOPROLOL TARTRATE 50 MG/1
50 TABLET, FILM COATED ORAL 2 TIMES DAILY
Qty: 180 TABLET | Refills: 0 | Status: SHIPPED | OUTPATIENT
Start: 2020-06-05 | End: 2020-09-11

## 2020-06-05 RX ORDER — ESOMEPRAZOLE MAGNESIUM 40 MG/1
40 CAPSULE, DELAYED RELEASE ORAL
Qty: 90 CAPSULE | Refills: 0 | Status: SHIPPED | OUTPATIENT
Start: 2020-06-05 | End: 2020-10-06

## 2020-06-05 NOTE — PROGRESS NOTES
Subjective   Kimberli Magallanes is a 59 y.o. female.     Chief Complaint   Patient presents with   • Bloated   • Hypertension   • Diabetes     This is my first time seeing this patient.   History of Present Illness   Diabetes Mellitus Type II, Follow-up: Patient here for follow-up of Type 2 diabetes mellitus. Symptoms: none. Home monitoring: The patient is checking blood sugars at home. Medication(s): none. Due for: A1C. Patient is currently exercising. Weight trend: increasing.     Hypertension: Patient here for follow-up of essential hypertension. Blood pressure is not well controlled at home.She is exercising and is adherent to low salt diet. Home monitoring: The patient is checking blood pressure at home. Symptoms: none. Medications: The patient is adherent with their medication regimen. Medication(s): Beta Blocker and Calcium Channel Blocker. The patient is due for Lipid panel and Serum creatinine.    Hyperlipidemia: Symptoms: none. Medications:The patient is adherent with their medication regimen. Medication(s): atorvastatin and Effient. Her last labs showed total cholesterol 154, HDL 50, LDL 90,  triglycerides 71. The patient is due for lipid panel and liver function tests .    GERD: Patient reports doing poorly. Symptoms: epigastric pain, bloating and belching. Current treatment includes OTC Nexium. Patient is adherent to treatment.       The following portions of the patient's history were reviewed and updated as appropriate: allergies, current medications, past family history, past medical history, past social history, past surgical history and problem list.    Past Medical History:   Diagnosis Date   • Abnormal electrocardiogram    • Adverse reaction to ACE inhibitor drug    • Adverse reaction to angiotensin 2 receptor antagonist    • BMI 40.0-44.9, adult (CMS/[facility])    • Diabetes mellitus (CMS/[facility])     Diet Controlled   • Encounter for annual health examination    • Encounter for hepatitis C screening test for  low risk patient    • Esophageal reflux    • Foot deformity, acquired, right    • Glucosuria    • Hematuria, microscopic    • Hyperlipidemia    • Hypertension    • Refused influenza vaccine    • Visit for screening mammogram        Past Surgical History:   Procedure Laterality Date   • BREAST LUMPECTOMY Left 2019    benign calcification   • CARDIAC CATHETERIZATION      x1 stent LAD at [facility]   • COLONOSCOPY      16 normal, repeat in 10 years   • CORONARY STENT PLACEMENT  01/15/2020       Family History   Problem Relation Age of Onset   • Heart attack Mother 67   • Stroke Mother    • Hypertension Mother    • Heart disease Mother 67   • Diabetes Father    • Hypertension Father    • Heart disease Father 58   • No Known Problems Other    • Heart attack Maternal Grandmother        Social History     Socioeconomic History   • Marital status: Single     Spouse name: Not on file   • Number of children: Not on file   • Years of education: Not on file   • Highest education level: Not on file   Tobacco Use   • Smoking status: Former Smoker     Packs/day: 0.25     Years: 10.00     Pack years: 2.50     Types: Cigarettes     Last attempt to quit: 2012     Years since quittin.4   • Smokeless tobacco: Never Used   • Tobacco comment: None since    Substance and Sexual Activity   • Alcohol use: Yes     Alcohol/week: 0.0 - 2.0 standard drinks     Frequency: Monthly or less     Drinks per session: 1 or 2     Binge frequency: Less than monthly     Comment: social   • Drug use: No   • Sexual activity: Not Currently     Birth control/protection: Post-menopausal       Review of Systems   Eyes: Negative for visual disturbance.   Cardiovascular: Negative for chest pain and leg swelling.   Gastrointestinal: Positive for GERD.   Endocrine: Negative for polydipsia and polyuria.   Musculoskeletal: Negative for myalgias.       Objective   Vitals:    20 0909   BP: 140/90   Pulse: 74   Temp: 97.5 °F (36.4 °C)    TempSrc: Temporal   SpO2: 98%   Weight: 90.4 kg (199 lb 3.2 oz)      Body mass index is 40.23 kg/m².  Physical Exam   Constitutional: She is oriented to person, place, and time. She appears well-developed and well-nourished.   Cardiovascular: Normal rate, regular rhythm and normal heart sounds.   Pulmonary/Chest: Effort normal and breath sounds normal.   Abdominal: Soft. Bowel sounds are normal.   Neurological: She is alert and oriented to person, place, and time.   Skin: Skin is warm and dry.   Psychiatric: She has a normal mood and affect.   Nursing note and vitals reviewed.        Assessment/Plan   Kimberli was seen today for bloated, hypertension and diabetes.    Diagnoses and all orders for this visit:    Type 2 diabetes mellitus with other circulatory complication, without long-term current use of insulin (CMS/[facility])  -     Hemoglobin A1c    Essential hypertension  Comments:  - Will increase amlodipine to 10 mg daily.  - Patient will follow-up with cardiology in September.   Orders:  -     amLODIPine (NORVASC) 10 MG tablet; Take 1 tablet by mouth Daily.  -     metoprolol tartrate (LOPRESSOR) 50 MG tablet; Take 1 tablet by mouth 2 (Two) Times a Day.    Hyperlipidemia, unspecified hyperlipidemia type  -     Comprehensive Metabolic Panel  -     Lipid Panel With / Chol / HDL Ratio    Gastroesophageal reflux disease without esophagitis  Comments:  - Will increase esomeprazole to 40 mg daily.  - May consider EGD if symptoms persist or worsen.   Orders:  -     esomeprazole (nexIUM) 40 MG capsule; Take 1 capsule by mouth Every Morning Before Breakfast.

## 2020-06-06 LAB
ALBUMIN SERPL-MCNC: 4.8 G/DL (ref 3.5–5.2)
ALBUMIN/GLOB SERPL: 1.7 G/DL
ALP SERPL-CCNC: 97 U/L (ref 39–117)
ALT SERPL-CCNC: 19 U/L (ref 1–33)
AST SERPL-CCNC: 15 U/L (ref 1–32)
BILIRUB SERPL-MCNC: 0.4 MG/DL (ref 0.2–1.2)
BUN SERPL-MCNC: 13 MG/DL (ref 6–20)
BUN/CREAT SERPL: 16 (ref 7–25)
CALCIUM SERPL-MCNC: 10 MG/DL (ref 8.6–10.5)
CHLORIDE SERPL-SCNC: 107 MMOL/L (ref 98–107)
CHOLEST SERPL-MCNC: 151 MG/DL (ref 0–200)
CHOLEST/HDLC SERPL: 2.32 {RATIO}
CO2 SERPL-SCNC: 26.6 MMOL/L (ref 22–29)
CREAT SERPL-MCNC: 0.81 MG/DL (ref 0.57–1)
GLOBULIN SER CALC-MCNC: 2.9 GM/DL
GLUCOSE SERPL-MCNC: 121 MG/DL (ref 65–99)
HBA1C MFR BLD: 7.3 % (ref 4.8–5.6)
HDLC SERPL-MCNC: 65 MG/DL (ref 40–60)
LDLC SERPL CALC-MCNC: 69 MG/DL (ref 0–100)
POTASSIUM SERPL-SCNC: 4.6 MMOL/L (ref 3.5–5.2)
PROT SERPL-MCNC: 7.7 G/DL (ref 6–8.5)
SODIUM SERPL-SCNC: 143 MMOL/L (ref 136–145)
TRIGL SERPL-MCNC: 87 MG/DL (ref 0–150)
VLDLC SERPL CALC-MCNC: 17.4 MG/DL

## 2020-06-09 ENCOUNTER — TELEPHONE (OUTPATIENT)
Dept: FAMILY MEDICINE CLINIC | Facility: CLINIC | Age: 60
End: 2020-06-09

## 2020-06-09 NOTE — TELEPHONE ENCOUNTER
Diabetes is uncontrolled with A1c of 7.3% so would recommend restarting antidiabetic medication or working harder on diet. Will repeat A1c in 3 months. Kidney function tests are normal. Electrolytes are normal. Liver function tests are normal. Cholesterol panel looks great so continue current treatment regimen.    Patient aware of results and recommendations. Patient prefers to work on diet at this time, but will return in 3 months for recheck.

## 2020-07-21 ENCOUNTER — OFFICE VISIT (OUTPATIENT)
Dept: FAMILY MEDICINE CLINIC | Facility: CLINIC | Age: 60
End: 2020-07-21

## 2020-07-21 VITALS
SYSTOLIC BLOOD PRESSURE: 122 MMHG | DIASTOLIC BLOOD PRESSURE: 80 MMHG | HEART RATE: 69 BPM | HEIGHT: 59 IN | BODY MASS INDEX: 40.52 KG/M2 | TEMPERATURE: 97.8 F | OXYGEN SATURATION: 98 % | WEIGHT: 201 LBS

## 2020-07-21 DIAGNOSIS — R14.0 ABDOMINAL BLOATING: ICD-10-CM

## 2020-07-21 DIAGNOSIS — R10.13 ABDOMINAL DISCOMFORT, EPIGASTRIC: Primary | ICD-10-CM

## 2020-07-21 DIAGNOSIS — T14.8XXA BRUISING: ICD-10-CM

## 2020-07-21 PROCEDURE — 99214 OFFICE O/P EST MOD 30 MIN: CPT | Performed by: INTERNAL MEDICINE

## 2020-07-21 NOTE — PROGRESS NOTES
"Subjective   Kimberli Magallanes is a 59 y.o. female.     Chief Complaint   Patient presents with   • abdominal discomfort       History of Present Illness   Answers for HPI/ROS submitted by the patient on 7/16/2020   What is the primary reason for your visit?: Other  Please describe your symptoms.: Bloating in the upper stomach area under the breast.  Have you had these symptoms before?: Yes  How long have you been having these symptoms?: Greater than 2 weeks  Please list any medications you are currently taking for this condition.: Nexum prescribed and Nexum OTC  Please describe any probable cause for these symptoms. : Bloating and discomfort.  States after eating has feeling of bloating and uncomfortable feeling in the mid upper abdomen that seems to have worsened in the last 3 months.  Has tried taking nitroglycerin that does not help.  The discomfort lasts for about 20 minutes and burping helps some.  No chest pain, weakness or diarrhea.  No issues with exercise.  Some short of breath like the bloating is restricting her breathing ability.  Sometimes with burning in the upper mid abdomen.  No vomiting, diarrhea, constipation.  Is currently on nexium 40 mg in am and 20 mg at night.  Using probiotic as well but not much relief.  Patient with feeling that the blood is very thin \"as water\" when she checks her blood sugar.  Has been bruising a lot and the gums are puffy a lot.  On effient and aspirin.  Has follow up with cardiology on 9/21/2020.    The following portions of the patient's history were reviewed and updated as appropriate: allergies, current medications, past family history, past medical history, past social history, past surgical history and problem list.    Depression Screen:  No flowsheet data found.    Past Medical History:   Diagnosis Date   • Abnormal electrocardiogram    • Adverse reaction to ACE inhibitor drug    • Adverse reaction to angiotensin 2 receptor antagonist    • BMI 40.0-44.9, adult " (CMS/[facility])    • Diabetes mellitus (CMS/[facility])     Diet Controlled   • Encounter for annual health examination    • Encounter for hepatitis C screening test for low risk patient    • Esophageal reflux    • Foot deformity, acquired, right    • Glucosuria    • Hematuria, microscopic    • Hyperlipidemia    • Hypertension    • Refused influenza vaccine    • Visit for screening mammogram        Past Surgical History:   Procedure Laterality Date   • BREAST LUMPECTOMY Left 2019    benign calcification   • CARDIAC CATHETERIZATION      x1 stent LAD at [facility]   • COLONOSCOPY      16 normal, repeat in 10 years   • CORONARY STENT PLACEMENT  01/15/2020       Family History   Problem Relation Age of Onset   • Heart attack Mother 67   • Stroke Mother    • Hypertension Mother    • Heart disease Mother 67   • Diabetes Father    • Hypertension Father    • Heart disease Father 58   • No Known Problems Other    • Heart attack Maternal Grandmother        Social History     Socioeconomic History   • Marital status: Single     Spouse name: Not on file   • Number of children: Not on file   • Years of education: Not on file   • Highest education level: Not on file   Tobacco Use   • Smoking status: Former Smoker     Packs/day: 0.25     Years: 10.00     Pack years: 2.50     Types: Cigarettes     Last attempt to quit: 2012     Years since quittin.5   • Smokeless tobacco: Never Used   • Tobacco comment: None since    Substance and Sexual Activity   • Alcohol use: Yes     Alcohol/week: 0.0 - 2.0 standard drinks     Frequency: Monthly or less     Drinks per session: 1 or 2     Binge frequency: Less than monthly     Comment: social   • Drug use: No   • Sexual activity: Not Currently     Birth control/protection: Post-menopausal       Current Outpatient Medications   Medication Sig Dispense Refill   • amLODIPine (NORVASC) 10 MG tablet Take 1 tablet by mouth Daily. 90 tablet 0   • ASPIRIN LOW DOSE 81 MG chewable tablet Chew 1  tablet Daily. 90 tablet 0   • atorvastatin (LIPITOR) 40 MG tablet Take 1 tablet by mouth Daily. 90 tablet 1   • BIOTIN PO Take  by mouth.     • esomeprazole (nexIUM) 40 MG capsule Take 1 capsule by mouth Every Morning Before Breakfast. 90 capsule 0   • metoprolol tartrate (LOPRESSOR) 50 MG tablet Take 1 tablet by mouth 2 (Two) Times a Day. 180 tablet 0   • nitroglycerin (NITROSTAT) 0.4 MG SL tablet Place 1 tablet under the tongue Every 5 (Five) Minutes As Needed for Chest Pain. 30 tablet 0   • prasugrel (EFFIENT) 10 MG tablet Take 1 tablet by mouth Daily. 90 tablet 1   • Probiotic Product (PROBIOTIC-10 PO) Take  by mouth.     • vitamin C (ASCORBIC ACID) 250 MG tablet Take 250 mg by mouth Daily.       No current facility-administered medications for this visit.        Review of Systems   Constitutional: Negative for activity change, appetite change, fatigue, fever, unexpected weight gain and unexpected weight loss.   HENT: Negative for nosebleeds, rhinorrhea, trouble swallowing and voice change.    Eyes: Negative for visual disturbance.   Respiratory: Negative for cough, chest tightness, shortness of breath and wheezing.    Cardiovascular: Negative for chest pain, palpitations and leg swelling.   Gastrointestinal: Positive for abdominal distention, abdominal pain and nausea. Negative for blood in stool, constipation, diarrhea, vomiting, GERD and indigestion.   Genitourinary: Negative for dysuria, frequency and hematuria.   Musculoskeletal: Negative for arthralgias, back pain and myalgias.   Skin: Negative for rash and bruise.   Neurological: Negative for dizziness, tremors, weakness, light-headedness, numbness, headache and memory problem.   Hematological: Negative for adenopathy. Does not bruise/bleed easily.   Psychiatric/Behavioral: Negative for sleep disturbance and depressed mood. The patient is not nervous/anxious.        Objective   /80 (BP Location: Left arm, Patient Position: Sitting, Cuff Size: Large  "Adult)   Pulse 69   Temp 97.8 °F (36.6 °C) (Temporal)   Ht 149.9 cm (59.02\")   Wt 91.2 kg (201 lb)   SpO2 98%   BMI 40.58 kg/m²     Physical Exam   Constitutional: She is oriented to person, place, and time. She appears well-developed and well-nourished.   HENT:   Head: Normocephalic and atraumatic.   Eyes: Pupils are equal, round, and reactive to light. Conjunctivae and EOM are normal.   Neck: Normal range of motion. Neck supple. No tracheal deviation present. No thyromegaly present.   Cardiovascular: Normal rate, regular rhythm and normal heart sounds. Exam reveals no gallop and no friction rub.   No murmur heard.  Pulmonary/Chest: Effort normal and breath sounds normal. No respiratory distress. She exhibits no tenderness.   Abdominal: Soft. Bowel sounds are normal. She exhibits no distension. There is no tenderness.   Epigastric greater than right upper quadrant pain.  Superficial firm mobile nodule in the epigastric region.   Musculoskeletal: Normal range of motion. She exhibits no edema or tenderness.   Lymphadenopathy:     She has no cervical adenopathy.   Neurological: She is alert and oriented to person, place, and time.   Skin: Skin is warm and dry.   Psychiatric: She has a normal mood and affect. Her behavior is normal. Judgment and thought content normal.   Nursing note and vitals reviewed.      Recent Results (from the past 2016 hour(s))   Hemoglobin A1c    Collection Time: 06/05/20  9:48 AM   Result Value Ref Range    Hemoglobin A1C 7.30 (H) 4.80 - 5.60 %   Comprehensive Metabolic Panel    Collection Time: 06/05/20  9:48 AM   Result Value Ref Range    Glucose 121 (H) 65 - 99 mg/dL    BUN 13 6 - 20 mg/dL    Creatinine 0.81 0.57 - 1.00 mg/dL    eGFR Non African Am 73 >60 mL/min/1.73    eGFR African Am 88 >60 mL/min/1.73    BUN/Creatinine Ratio 16.0 7.0 - 25.0    Sodium 143 136 - 145 mmol/L    Potassium 4.6 3.5 - 5.2 mmol/L    Chloride 107 98 - 107 mmol/L    Total CO2 26.6 22.0 - 29.0 mmol/L    " Calcium 10.0 8.6 - 10.5 mg/dL    Total Protein 7.7 6.0 - 8.5 g/dL    Albumin 4.80 3.50 - 5.20 g/dL    Globulin 2.9 gm/dL    A/G Ratio 1.7 g/dL    Total Bilirubin 0.4 0.2 - 1.2 mg/dL    Alkaline Phosphatase 97 39 - 117 U/L    AST (SGOT) 15 1 - 32 U/L    ALT (SGPT) 19 1 - 33 U/L   Lipid Panel With / Chol / HDL Ratio    Collection Time: 06/05/20  9:48 AM   Result Value Ref Range    Total Cholesterol 151 0 - 200 mg/dL    Triglycerides 87 0 - 150 mg/dL    HDL Cholesterol 65 (H) 40 - 60 mg/dL    VLDL Cholesterol 17.4 mg/dL    LDL Cholesterol  69 0 - 100 mg/dL    Chol/HDL Ratio 2.32      Assessment/Plan   Kimberli was seen today for abdominal discomfort.    Diagnoses and all orders for this visit:    Abdominal discomfort, epigastric  -     CBC & Differential  -     Lipase  -     Comprehensive Metabolic Panel  -     CT Abdomen Pelvis With Contrast; Future    Bruising  -     CBC & Differential    Abdominal bloating  -     Lipase  -     Comprehensive Metabolic Panel  -     CT Abdomen Pelvis With Contrast; Future    Will obtain CBC lipase and CMP for full evaluation.  Symptoms by history are consistent with possible gastritis or gallbladder issues.  However noted to have a possible soft tissue mass.  Will obtain a CT of the abdomen and pelvis to further evaluate.  No changes in medication at this time unless otherwise indicated by her labs.

## 2020-07-22 DIAGNOSIS — R14.0 ABDOMINAL BLOATING: ICD-10-CM

## 2020-07-22 DIAGNOSIS — R10.13 ABDOMINAL DISCOMFORT, EPIGASTRIC: Primary | ICD-10-CM

## 2020-07-22 LAB
ALBUMIN SERPL-MCNC: 4.8 G/DL (ref 3.8–4.9)
ALBUMIN/GLOB SERPL: 1.8 {RATIO} (ref 1.2–2.2)
ALP SERPL-CCNC: 87 IU/L (ref 39–117)
ALT SERPL-CCNC: 26 IU/L (ref 0–32)
AST SERPL-CCNC: 21 IU/L (ref 0–40)
BASOPHILS # BLD AUTO: 0.1 X10E3/UL (ref 0–0.2)
BASOPHILS NFR BLD AUTO: 1 %
BILIRUB SERPL-MCNC: 0.4 MG/DL (ref 0–1.2)
BUN SERPL-MCNC: 11 MG/DL (ref 6–24)
BUN/CREAT SERPL: 14 (ref 9–23)
CALCIUM SERPL-MCNC: 9.9 MG/DL (ref 8.7–10.2)
CHLORIDE SERPL-SCNC: 104 MMOL/L (ref 96–106)
CO2 SERPL-SCNC: 25 MMOL/L (ref 20–29)
CREAT SERPL-MCNC: 0.79 MG/DL (ref 0.57–1)
EOSINOPHIL # BLD AUTO: 0.3 X10E3/UL (ref 0–0.4)
EOSINOPHIL NFR BLD AUTO: 3 %
ERYTHROCYTE [DISTWIDTH] IN BLOOD BY AUTOMATED COUNT: 13.9 % (ref 11.7–15.4)
GLOBULIN SER CALC-MCNC: 2.6 G/DL (ref 1.5–4.5)
GLUCOSE SERPL-MCNC: 108 MG/DL (ref 65–99)
HCT VFR BLD AUTO: 42.4 % (ref 34–46.6)
HGB BLD-MCNC: 14 G/DL (ref 11.1–15.9)
IMM GRANULOCYTES # BLD AUTO: 0 X10E3/UL (ref 0–0.1)
IMM GRANULOCYTES NFR BLD AUTO: 0 %
LIPASE SERPL-CCNC: 27 U/L (ref 14–72)
LYMPHOCYTES # BLD AUTO: 2.7 X10E3/UL (ref 0.7–3.1)
LYMPHOCYTES NFR BLD AUTO: 32 %
MCH RBC QN AUTO: 29.6 PG (ref 26.6–33)
MCHC RBC AUTO-ENTMCNC: 33 G/DL (ref 31.5–35.7)
MCV RBC AUTO: 90 FL (ref 79–97)
MONOCYTES # BLD AUTO: 0.7 X10E3/UL (ref 0.1–0.9)
MONOCYTES NFR BLD AUTO: 9 %
NEUTROPHILS # BLD AUTO: 4.6 X10E3/UL (ref 1.4–7)
NEUTROPHILS NFR BLD AUTO: 55 %
PLATELET # BLD AUTO: 209 X10E3/UL (ref 150–450)
POTASSIUM SERPL-SCNC: 4.2 MMOL/L (ref 3.5–5.2)
PROT SERPL-MCNC: 7.4 G/DL (ref 6–8.5)
RBC # BLD AUTO: 4.73 X10E6/UL (ref 3.77–5.28)
SODIUM SERPL-SCNC: 145 MMOL/L (ref 134–144)
WBC # BLD AUTO: 8.4 X10E3/UL (ref 3.4–10.8)

## 2020-08-16 DIAGNOSIS — I10 ESSENTIAL HYPERTENSION: ICD-10-CM

## 2020-08-17 RX ORDER — AMLODIPINE BESYLATE 10 MG/1
TABLET ORAL
Qty: 90 TABLET | Refills: 3 | Status: SHIPPED | OUTPATIENT
Start: 2020-08-17 | End: 2021-03-24 | Stop reason: SDUPTHER

## 2020-09-11 DIAGNOSIS — I10 ESSENTIAL HYPERTENSION: ICD-10-CM

## 2020-09-11 DIAGNOSIS — I25.119 CORONARY ARTERY DISEASE INVOLVING NATIVE CORONARY ARTERY OF NATIVE HEART WITH ANGINA PECTORIS (HCC): ICD-10-CM

## 2020-09-11 DIAGNOSIS — E78.5 HYPERLIPIDEMIA, UNSPECIFIED HYPERLIPIDEMIA TYPE: ICD-10-CM

## 2020-09-11 RX ORDER — PRASUGREL 10 MG/1
TABLET, FILM COATED ORAL
Qty: 90 TABLET | Refills: 3 | Status: SHIPPED | OUTPATIENT
Start: 2020-09-11 | End: 2020-10-06

## 2020-09-11 RX ORDER — METOPROLOL TARTRATE 50 MG/1
TABLET, FILM COATED ORAL
Qty: 180 TABLET | Refills: 3 | Status: SHIPPED | OUTPATIENT
Start: 2020-09-11 | End: 2020-10-27 | Stop reason: SDUPTHER

## 2020-09-11 RX ORDER — ATORVASTATIN CALCIUM 40 MG/1
TABLET, FILM COATED ORAL
Qty: 90 TABLET | Refills: 3 | Status: SHIPPED | OUTPATIENT
Start: 2020-09-11 | End: 2020-10-27 | Stop reason: SDUPTHER

## 2020-09-21 ENCOUNTER — OFFICE VISIT (OUTPATIENT)
Dept: CARDIOLOGY | Facility: CLINIC | Age: 60
End: 2020-09-21

## 2020-09-21 VITALS
WEIGHT: 199 LBS | HEIGHT: 59 IN | SYSTOLIC BLOOD PRESSURE: 150 MMHG | DIASTOLIC BLOOD PRESSURE: 88 MMHG | BODY MASS INDEX: 40.12 KG/M2 | HEART RATE: 59 BPM

## 2020-09-21 DIAGNOSIS — E11.59 TYPE 2 DIABETES MELLITUS WITH OTHER CIRCULATORY COMPLICATION, WITHOUT LONG-TERM CURRENT USE OF INSULIN (HCC): ICD-10-CM

## 2020-09-21 DIAGNOSIS — E78.5 HYPERLIPIDEMIA, UNSPECIFIED HYPERLIPIDEMIA TYPE: ICD-10-CM

## 2020-09-21 DIAGNOSIS — I25.119 CORONARY ARTERY DISEASE INVOLVING NATIVE CORONARY ARTERY OF NATIVE HEART WITH ANGINA PECTORIS (HCC): ICD-10-CM

## 2020-09-21 DIAGNOSIS — I10 ESSENTIAL HYPERTENSION: Primary | ICD-10-CM

## 2020-09-21 PROCEDURE — 99214 OFFICE O/P EST MOD 30 MIN: CPT | Performed by: INTERNAL MEDICINE

## 2020-09-21 PROCEDURE — 93000 ELECTROCARDIOGRAM COMPLETE: CPT | Performed by: INTERNAL MEDICINE

## 2020-09-21 NOTE — PROGRESS NOTES
Kimberli Magallanes  1960  Date of Office Visit: 09/21/20  Encounter Provider: Lex Morales MD  Place of Service: [facility] CARDIOLOGY      CHIEF COMPLAINT:  Coronary artery disease    HISTORY OF PRESENT ILLNESS:  60-year-old female with a medical history of coronary artery disease, non-ST elevation MI, diabetes mellitus, mild hyperlipidemia, and hypertension along with angioedema with ACE inhibitor who presents to me in follow-up.. She was recently, in 01/2020, in the hospital at [place] with chest discomfort and a NSTEMI. Her troponin peaked based on the last measurement of about 0.7. She was noted to have a very proximal 1st diagonal versus ramus with an 80% stenosis that was stented with an [place] 2.5 x 18 mm drug-eluting stent and postdilated to 2.75. There was mild mid LAD disease, about 30%. The right coronary was nondominant. Her ejection fraction on ventriculogram was normal but she did not have an echocardiogram.    She has been doing well since our last visit.  She states that she has indigestion that is really bad.  Reportedly after she eats she will have some chest discomfort infrequently that will go away on its own.  She does not have these episodes with walking.  She also complains of bruising of her arms when she hits something.             Review of Systems   Constitution: Negative for fever and malaise/fatigue.   HENT: Negative for nosebleeds and sore throat.    Eyes: Negative for blurred vision and double vision.   Cardiovascular: Negative for chest pain, claudication, palpitations and syncope.   Respiratory: Negative for cough, shortness of breath and snoring.    Endocrine: Negative for cold intolerance, heat intolerance and polydipsia.   Skin: Negative for itching, poor wound healing and rash.   Musculoskeletal: Negative for joint pain, joint swelling, muscle weakness and myalgias.   Gastrointestinal: Negative for abdominal pain, melena, nausea and vomiting.    Neurological: Negative for light-headedness, loss of balance, seizures, vertigo and weakness.   Psychiatric/Behavioral: Negative for altered mental status and depression.        Past Medical History:   Diagnosis Date   • Abnormal electrocardiogram    • Adverse reaction to ACE inhibitor drug    • Adverse reaction to angiotensin 2 receptor antagonist    • BMI 40.0-44.9, adult (CMS/[facility])    • Diabetes mellitus (CMS/[facility])     Diet Controlled   • Encounter for annual health examination    • Encounter for hepatitis C screening test for low risk patient    • Esophageal reflux    • Foot deformity, acquired, right    • Glucosuria    • Hematuria, microscopic    • Hyperlipidemia    • Hypertension    • Refused influenza vaccine    • Visit for screening mammogram        The following portions of the patient's history were reviewed and updated as appropriate: Social history , Family history and Surgical history     Current Outpatient Medications on File Prior to Visit   Medication Sig Dispense Refill   • amLODIPine (NORVASC) 10 MG tablet TAKE 1 TABLET DAILY 90 tablet 3   • ASPIRIN LOW DOSE 81 MG chewable tablet Chew 1 tablet Daily. 90 tablet 0   • atorvastatin (LIPITOR) 40 MG tablet TAKE 1 TABLET DAILY 90 tablet 3   • BIOTIN PO Take  by mouth.     • esomeprazole (nexIUM) 40 MG capsule Take 1 capsule by mouth Every Morning Before Breakfast. 90 capsule 0   • metoprolol tartrate (LOPRESSOR) 50 MG tablet TAKE 1 TABLET TWICE A  tablet 3   • nitroglycerin (NITROSTAT) 0.4 MG SL tablet Place 1 tablet under the tongue Every 5 (Five) Minutes As Needed for Chest Pain. 30 tablet 0   • prasugrel (EFFIENT) 10 MG tablet TAKE 1 TABLET DAILY 90 tablet 3   • Probiotic Product (PROBIOTIC-10 PO) Take  by mouth.     • vitamin C (ASCORBIC ACID) 250 MG tablet Take 250 mg by mouth Daily.       No current facility-administered medications on file prior to visit.        Allergies   Allergen Reactions   • Lisinopril Anaphylaxis and Swelling   •  "Losartan Anaphylaxis and Swelling   • Citrullus Vulgaris Unknown - Low Severity   • Eggs Or Egg-Derived Products Unknown - Low Severity       Vitals:    09/21/20 1552   BP: 150/88   Pulse: 59   Weight: 90.3 kg (199 lb)   Height: 149.9 cm (59\")     Physical Exam   Constitutional: She is oriented to person, place, and time. She appears well-developed and well-nourished.   HENT:   Head: Normocephalic and atraumatic.   Eyes: Conjunctivae and EOM are normal. No scleral icterus.   Neck: Normal range of motion. Neck supple. Normal carotid pulses, no hepatojugular reflux and no JVD present. Carotid bruit is not present. No tracheal deviation present. No thyromegaly present.   Cardiovascular: Normal rate and regular rhythm. Exam reveals no gallop and no friction rub.   No murmur heard.  Pulses:       Carotid pulses are 2+ on the right side and 2+ on the left side.       Radial pulses are 2+ on the right side and 2+ on the left side.        Femoral pulses are 2+ on the right side and 2+ on the left side.       Dorsalis pedis pulses are 2+ on the right side and 2+ on the left side.        Posterior tibial pulses are 2+ on the right side and 2+ on the left side.   Pulmonary/Chest: Breath sounds normal. No respiratory distress. She has no decreased breath sounds. She has no wheezes. She has no rhonchi. She has no rales. She exhibits no tenderness.   Abdominal: Soft. Bowel sounds are normal. She exhibits no distension. There is no abdominal tenderness. There is no rebound.   Musculoskeletal:         General: No deformity or edema.   Neurological: She is alert and oriented to person, place, and time. She has normal strength. No sensory deficit.   Skin: No rash noted. No erythema.   Psychiatric: She has a normal mood and affect. Her behavior is normal.       Lab Results   Component Value Date    WBC 8.4 07/21/2020    HGB 14.0 07/21/2020    HCT 42.4 07/21/2020    MCV 90 07/21/2020     07/21/2020       Lab Results   Component " Value Date    GLUCOSE 115 (H) 06/09/2014    BUN 11 07/21/2020    CREATININE 0.79 07/21/2020    EGFRIFNONA 83 07/21/2020    EGFRIFAFRI 95 07/21/2020    BCR 14 07/21/2020    K 4.2 07/21/2020    CO2 25 07/21/2020    CALCIUM 9.9 07/21/2020    PROTENTOTREF 7.4 07/21/2020    ALBUMIN 4.8 07/21/2020    LABIL2 1.8 07/21/2020    AST 21 07/21/2020    ALT 26 07/21/2020       Lab Results   Component Value Date    GLUCOSE 115 (H) 06/09/2014    CALCIUM 9.9 07/21/2020     (H) 07/21/2020    K 4.2 07/21/2020    CO2 25 07/21/2020     07/21/2020    BUN 11 07/21/2020    CREATININE 0.79 07/21/2020    EGFRIFAFRI 95 07/21/2020    EGFRIFNONA 83 07/21/2020    BCR 14 07/21/2020       Lab Results   Component Value Date    CHLPL 151 06/05/2020    TRIG 87 06/05/2020    HDL 65 (H) 06/05/2020    LDL 69 06/05/2020         ECG 12 Lead    Date/Time: 9/21/2020 4:42 PM  Performed by: Lex Morales MD  Authorized by: Lex Morales MD   Comparison: compared with previous ECG from 2/5/2020  Similar to previous ECG  Rhythm: sinus rhythm  Rate: normal    Clinical impression: non-specific ECG  Comments: Nonspecific T wave abnormality diffuse leads.             Results for orders placed during the hospital encounter of 02/19/20   Adult Transthoracic Echo Complete W/ Cont if Necessary Per Protocol    Narrative · Calculated right ventricular systolic pressure from tricuspid   regurgitation is 22 mmHg.  · Estimated EF = 62%.  · Left ventricular systolic function is normal.  · Left ventricular wall thickness is consistent with mild concentric   hypertrophy.          DISCUSSION/SUMMARY  Very pleasant 60-year-old female with a medical history of recent non-ST elevation MI, coronary artery disease with PCI to the diagonal, hyperlipidemia that is mild, hypertension with prior angioedema with ACE inhibitor presents to me in follow-up. She has been doing very well since her non-ST elevation MI.  I do not think her discomfort is angina.   She is able to walk and do other activities without recurrent discomfort.    1.  Non-ST elevation MI, recent. Coronary artery disease with PCI to the diagonal.   -  Continue aspirin lifelong.  I think she can come off of her pressor at this time.   -  Continue Lopressor and atorvastatin at the current dose.   2.  Essential hypertension: Blood pressure is elevated.  I have recommended that she monitor this over the next couple weeks and give me a call to discuss her readings.  3.  Hyperlipidemia: Continue atorvastatin therap  4.  Gastroesophageal reflux disease: I recommended moving her proton pump inhibitor to twice daily.  If her symptoms continue I will refer her to GI for endoscopy

## 2020-10-06 ENCOUNTER — OFFICE VISIT (OUTPATIENT)
Dept: FAMILY MEDICINE CLINIC | Facility: CLINIC | Age: 60
End: 2020-10-06

## 2020-10-06 VITALS
DIASTOLIC BLOOD PRESSURE: 82 MMHG | SYSTOLIC BLOOD PRESSURE: 130 MMHG | HEIGHT: 59 IN | TEMPERATURE: 98.2 F | WEIGHT: 199 LBS | BODY MASS INDEX: 40.12 KG/M2 | OXYGEN SATURATION: 99 % | HEART RATE: 78 BPM

## 2020-10-06 DIAGNOSIS — K21.9 GASTROESOPHAGEAL REFLUX DISEASE WITHOUT ESOPHAGITIS: ICD-10-CM

## 2020-10-06 DIAGNOSIS — R07.89 XYPHOIDALGIA: Primary | ICD-10-CM

## 2020-10-06 PROCEDURE — 99214 OFFICE O/P EST MOD 30 MIN: CPT | Performed by: INTERNAL MEDICINE

## 2020-10-06 RX ORDER — PANTOPRAZOLE SODIUM 40 MG/1
40 TABLET, DELAYED RELEASE ORAL DAILY
Qty: 30 TABLET | Refills: 4 | Status: SHIPPED | OUTPATIENT
Start: 2020-10-06 | End: 2020-10-27 | Stop reason: SDUPTHER

## 2020-10-06 NOTE — PROGRESS NOTES
Subjective   Kimberli Magallanes is a 59 y.o. female.     Chief Complaint   Patient presents with   • Abdominal Pain   • Bloated       History of Present Illness   Bloating and discomfort.  States after eating has feeling of bloating and uncomfortable feeling in the mid upper abdomen that seems to have worsened in the last 3 months.  Has tried taking nitroglycerin that does not help.  The discomfort lasts for about 20 minutes and burping helps some.  No chest pain, weakness or diarrhea.  No issues with exercise.  Some short of breath like the bloating is restricting her breathing ability.  States is tender when she pushes on the bottom of the sternum feels the discomfort.  Is not really in the abdomen.  Sometimes has burning in the upper mid abdomen.  No vomiting, diarrhea, constipation.  Is currently on nexium 40 mg at night.  The nexium only helps with the heartburn.  Using probiotic as well but not much relief. In July had CBC, CMP, lipase and CT abdomen performed which were normal other than leiomyomata by CT.      The following portions of the patient's history were reviewed and updated as appropriate: allergies, current medications, past family history, past medical history, past social history, past surgical history and problem list.    Depression Screen:  No flowsheet data found.    Past Medical History:   Diagnosis Date   • Abnormal electrocardiogram    • Adverse reaction to ACE inhibitor drug    • Adverse reaction to angiotensin 2 receptor antagonist    • BMI 40.0-44.9, adult (CMS/HCC)    • Diabetes mellitus (CMS/HCC)     Diet Controlled   • Encounter for annual health examination    • Encounter for hepatitis C screening test for low risk patient    • Esophageal reflux    • Foot deformity, acquired, right    • Glucosuria    • Hematuria, microscopic    • Hyperlipidemia    • Hypertension    • Refused influenza vaccine    • Visit for screening mammogram        Past Surgical History:   Procedure Laterality Date   •  BREAST LUMPECTOMY Left 2019    benign calcification   • CARDIAC CATHETERIZATION      x1 stent LAD at [facility]   • COLONOSCOPY      16 normal, repeat in 10 years   • CORONARY STENT PLACEMENT  01/15/2020       Family History   Problem Relation Age of Onset   • Heart attack Mother 67   • Stroke Mother    • Hypertension Mother    • Heart disease Mother 67   • Diabetes Father    • Hypertension Father    • Heart disease Father 58   • No Known Problems Other    • Heart attack Maternal Grandmother        Social History     Socioeconomic History   • Marital status: Single     Spouse name: Not on file   • Number of children: Not on file   • Years of education: Not on file   • Highest education level: Not on file   Tobacco Use   • Smoking status: Former Smoker     Packs/day: 0.25     Years: 10.00     Pack years: 2.50     Types: Cigarettes     Quit date:      Years since quittin.7   • Smokeless tobacco: Never Used   • Tobacco comment: None since    Substance and Sexual Activity   • Alcohol use: Yes     Alcohol/week: 0.0 - 2.0 standard drinks     Frequency: Monthly or less     Drinks per session: 1 or 2     Binge frequency: Less than monthly     Comment: social   • Drug use: No   • Sexual activity: Not Currently     Birth control/protection: Post-menopausal       Current Outpatient Medications   Medication Sig Dispense Refill   • amLODIPine (NORVASC) 10 MG tablet TAKE 1 TABLET DAILY 90 tablet 3   • ASPIRIN LOW DOSE 81 MG chewable tablet Chew 1 tablet Daily. 90 tablet 0   • atorvastatin (LIPITOR) 40 MG tablet TAKE 1 TABLET DAILY 90 tablet 3   • BIOTIN PO Take  by mouth.     • metoprolol tartrate (LOPRESSOR) 50 MG tablet TAKE 1 TABLET TWICE A  tablet 3   • nitroglycerin (NITROSTAT) 0.4 MG SL tablet Place 1 tablet under the tongue Every 5 (Five) Minutes As Needed for Chest Pain. 30 tablet 0   • Probiotic Product (PROBIOTIC-10 PO) Take  by mouth.     • vitamin C (ASCORBIC ACID) 250 MG tablet Take  "250 mg by mouth Daily.     • diclofenac (VOLTAREN) 1 % gel gel Apply 4 g topically to the appropriate area as directed 4 (Four) Times a Day As Needed (pain). 150 g 2   • pantoprazole (Protonix) 40 MG EC tablet Take 1 tablet by mouth Daily. 30 tablet 4     No current facility-administered medications for this visit.        Review of Systems   Constitutional: Negative for activity change, appetite change, fatigue, fever, unexpected weight gain and unexpected weight loss.   HENT: Negative for nosebleeds, rhinorrhea, trouble swallowing and voice change.    Eyes: Negative for visual disturbance.   Respiratory: Negative for cough, chest tightness, shortness of breath and wheezing.    Cardiovascular: Negative for chest pain, palpitations and leg swelling.   Gastrointestinal: Positive for abdominal distention, abdominal pain and nausea. Negative for blood in stool, constipation, diarrhea, vomiting, GERD and indigestion.   Genitourinary: Negative for dysuria, frequency and hematuria.   Musculoskeletal: Negative for arthralgias, back pain and myalgias.        Lower sternal pain   Skin: Negative for rash and bruise.   Neurological: Negative for dizziness, tremors, weakness, light-headedness, numbness, headache and memory problem.   Hematological: Negative for adenopathy. Does not bruise/bleed easily.   Psychiatric/Behavioral: Negative for sleep disturbance and depressed mood. The patient is not nervous/anxious.        Objective   /82 (BP Location: Left arm, Patient Position: Sitting, Cuff Size: Large Adult)   Pulse 78   Temp 98.2 °F (36.8 °C) (Temporal)   Ht 149.9 cm (59.02\")   Wt 90.3 kg (199 lb)   SpO2 99%   BMI 40.17 kg/m²     Physical Exam  Vitals signs and nursing note reviewed.   Constitutional:       General: She is not in acute distress.     Appearance: She is well-developed. She is not diaphoretic.   HENT:      Head: Normocephalic and atraumatic.      Right Ear: External ear normal.      Left Ear: External " ear normal.      Nose: Nose normal.   Eyes:      Conjunctiva/sclera: Conjunctivae normal.      Pupils: Pupils are equal, round, and reactive to light.   Neck:      Musculoskeletal: Normal range of motion and neck supple.      Thyroid: No thyromegaly.      Trachea: No tracheal deviation.   Cardiovascular:      Rate and Rhythm: Normal rate and regular rhythm.      Heart sounds: Normal heart sounds. No murmur. No friction rub. No gallop.    Pulmonary:      Effort: Pulmonary effort is normal. No respiratory distress.      Breath sounds: Normal breath sounds.   Abdominal:      General: Bowel sounds are normal.      Palpations: Abdomen is soft. There is no mass.      Tenderness: There is no abdominal tenderness. There is no guarding.   Musculoskeletal: Normal range of motion.   Lymphadenopathy:      Cervical: No cervical adenopathy.   Skin:     General: Skin is warm and dry.      Capillary Refill: Capillary refill takes less than 2 seconds.      Findings: No rash.   Neurological:      Mental Status: She is alert and oriented to person, place, and time.      Motor: No abnormal muscle tone.      Deep Tendon Reflexes: Reflexes normal.   Psychiatric:         Behavior: Behavior normal.         Thought Content: Thought content normal.         Judgment: Judgment normal.         Recent Results (from the past 2016 hour(s))   CBC & Differential    Collection Time: 07/21/20  3:40 PM    Specimen: Blood   Result Value Ref Range    WBC 8.4 3.4 - 10.8 x10E3/uL    RBC 4.73 3.77 - 5.28 x10E6/uL    Hemoglobin 14.0 11.1 - 15.9 g/dL    Hematocrit 42.4 34.0 - 46.6 %    MCV 90 79 - 97 fL    MCH 29.6 26.6 - 33.0 pg    MCHC 33.0 31.5 - 35.7 g/dL    RDW 13.9 11.7 - 15.4 %    Platelets 209 150 - 450 x10E3/uL    Neutrophil Rel % 55 Not Estab. %    Lymphocyte Rel % 32 Not Estab. %    Monocyte Rel % 9 Not Estab. %    Eosinophil Rel % 3 Not Estab. %    Basophil Rel % 1 Not Estab. %    Neutrophils Absolute 4.6 1.4 - 7.0 x10E3/uL    Lymphocytes Absolute  2.7 0.7 - 3.1 x10E3/uL    Monocytes Absolute 0.7 0.1 - 0.9 x10E3/uL    Eosinophils Absolute 0.3 0.0 - 0.4 x10E3/uL    Basophils Absolute 0.1 0.0 - 0.2 x10E3/uL    Immature Granulocyte Rel % 0 Not Estab. %    Immature Grans Absolute 0.0 0.0 - 0.1 x10E3/uL   Lipase    Collection Time: 07/21/20  3:40 PM    Specimen: Blood   Result Value Ref Range    Lipase 27 14 - 72 U/L   Comprehensive Metabolic Panel    Collection Time: 07/21/20  3:40 PM    Specimen: Blood   Result Value Ref Range    Glucose 108 (H) 65 - 99 mg/dL    BUN 11 6 - 24 mg/dL    Creatinine 0.79 0.57 - 1.00 mg/dL    eGFR Non African Am 83 >59 mL/min/1.73    eGFR African Am 95 >59 mL/min/1.73    BUN/Creatinine Ratio 14 9 - 23    Sodium 145 (H) 134 - 144 mmol/L    Potassium 4.2 3.5 - 5.2 mmol/L    Chloride 104 96 - 106 mmol/L    Total CO2 25 20 - 29 mmol/L    Calcium 9.9 8.7 - 10.2 mg/dL    Total Protein 7.4 6.0 - 8.5 g/dL    Albumin 4.8 3.8 - 4.9 g/dL    Globulin 2.6 1.5 - 4.5 g/dL    A/G Ratio 1.8 1.2 - 2.2    Total Bilirubin 0.4 0.0 - 1.2 mg/dL    Alkaline Phosphatase 87 39 - 117 IU/L    AST (SGOT) 21 0 - 40 IU/L    ALT (SGPT) 26 0 - 32 IU/L     Assessment/Plan   Kimberli was seen today for abdominal pain and bloated.    Diagnoses and all orders for this visit:    Xyphoidalgia  -     diclofenac (VOLTAREN) 1 % gel gel; Apply 4 g topically to the appropriate area as directed 4 (Four) Times a Day As Needed (pain).    Gastroesophageal reflux disease without esophagitis  -     pantoprazole (Protonix) 40 MG EC tablet; Take 1 tablet by mouth Daily.      Will try and change the nexium to protonix 40 mg daily.  Will try topical diclofenac gel to the xyphoid process and observe.  Follow up in 3 weeks as scheduled.

## 2020-10-27 ENCOUNTER — OFFICE VISIT (OUTPATIENT)
Dept: FAMILY MEDICINE CLINIC | Facility: CLINIC | Age: 60
End: 2020-10-27

## 2020-10-27 VITALS
TEMPERATURE: 97.3 F | WEIGHT: 204.4 LBS | OXYGEN SATURATION: 98 % | SYSTOLIC BLOOD PRESSURE: 144 MMHG | BODY MASS INDEX: 41.2 KG/M2 | RESPIRATION RATE: 16 BRPM | HEART RATE: 82 BPM | HEIGHT: 59 IN | DIASTOLIC BLOOD PRESSURE: 88 MMHG

## 2020-10-27 DIAGNOSIS — I10 ESSENTIAL HYPERTENSION: ICD-10-CM

## 2020-10-27 DIAGNOSIS — E78.5 HYPERLIPIDEMIA, UNSPECIFIED HYPERLIPIDEMIA TYPE: ICD-10-CM

## 2020-10-27 DIAGNOSIS — I25.119 CORONARY ARTERY DISEASE INVOLVING NATIVE CORONARY ARTERY OF NATIVE HEART WITH ANGINA PECTORIS (HCC): ICD-10-CM

## 2020-10-27 DIAGNOSIS — E11.59 TYPE 2 DIABETES MELLITUS WITH OTHER CIRCULATORY COMPLICATION, WITHOUT LONG-TERM CURRENT USE OF INSULIN (HCC): Primary | ICD-10-CM

## 2020-10-27 DIAGNOSIS — K21.9 GASTROESOPHAGEAL REFLUX DISEASE WITHOUT ESOPHAGITIS: ICD-10-CM

## 2020-10-27 PROCEDURE — 99214 OFFICE O/P EST MOD 30 MIN: CPT | Performed by: INTERNAL MEDICINE

## 2020-10-27 RX ORDER — METOPROLOL TARTRATE 50 MG/1
50 TABLET, FILM COATED ORAL 2 TIMES DAILY
Qty: 180 TABLET | Refills: 1 | Status: SHIPPED | OUTPATIENT
Start: 2020-10-27 | End: 2021-03-24 | Stop reason: SDUPTHER

## 2020-10-27 RX ORDER — ATORVASTATIN CALCIUM 40 MG/1
40 TABLET, FILM COATED ORAL DAILY
Qty: 90 TABLET | Refills: 1 | Status: SHIPPED | OUTPATIENT
Start: 2020-10-27 | End: 2021-03-24 | Stop reason: SDUPTHER

## 2020-10-27 RX ORDER — PANTOPRAZOLE SODIUM 40 MG/1
40 TABLET, DELAYED RELEASE ORAL DAILY
Qty: 90 TABLET | Refills: 3 | Status: SHIPPED | OUTPATIENT
Start: 2020-10-27 | End: 2021-03-24 | Stop reason: SDUPTHER

## 2020-10-27 NOTE — PROGRESS NOTES
Subjective   Kimberli Magallanes is a 59 y.o. female.     Chief Complaint   Patient presents with   • Diabetes     3 month follow up       History of Present Illness   Here for follow-up of diabetes.  Patient states to have been compliant with medications.  Blood sugar monitoring - patient states has been running on average 150-160.  With a range of 92 - 180.  No episodes of hypoglycemia, nausea, vomiting, new rashes, syncope or other issues.  Has not been able to follow the diet and exercise as well as she wants.    Follow-up for hypertension.  Currently has been feeling well and asymptomatic without any headaches,vision changes, cough, chest pain, shortness of breath, swelling, focal neurologic deficit, memory loss or syncope.  Has been taking the medications regularly and adherent with the regimen of amlodipine 10 mg, metoprolol 50 mg BID. Denies medication side effects and no significant interval events.      Follow-up for cholesterol.  Currently has been feeling well without any myalgias, muscle aches, weakness, numbness, chest pain, short of breath or other issues.  Currently is adherent with medication regimen of atorvastatin 40 mg and denies medication side effects. Is due for lab follow-up.  Last LDL 3 months ago of 69.      The following portions of the patient's history were reviewed and updated as appropriate: allergies, current medications, past family history, past medical history, past social history, past surgical history and problem list.    Depression Screen:  No flowsheet data found.    Past Medical History:   Diagnosis Date   • Abnormal electrocardiogram    • Adverse reaction to ACE inhibitor drug    • Adverse reaction to angiotensin 2 receptor antagonist    • BMI 40.0-44.9, adult (CMS/HCC)    • Diabetes mellitus (CMS/HCC)     Diet Controlled   • Encounter for annual health examination    • Encounter for hepatitis C screening test for low risk patient    • Esophageal reflux    • Foot deformity, acquired,  right    • Glucosuria    • Hematuria, microscopic    • Hyperlipidemia    • Hypertension    • Refused influenza vaccine    • Visit for screening mammogram        Past Surgical History:   Procedure Laterality Date   • BREAST LUMPECTOMY Left 2019    benign calcification   • CARDIAC CATHETERIZATION      x1 stent LAD at [facility]   • COLONOSCOPY      16 normal, repeat in 10 years   • CORONARY STENT PLACEMENT  01/15/2020       Family History   Problem Relation Age of Onset   • Heart attack Mother 67   • Stroke Mother    • Hypertension Mother    • Heart disease Mother 67   • Diabetes Father    • Hypertension Father    • Heart disease Father 58   • No Known Problems Other    • Heart attack Maternal Grandmother        Social History     Socioeconomic History   • Marital status: Single     Spouse name: Not on file   • Number of children: Not on file   • Years of education: Not on file   • Highest education level: Not on file   Tobacco Use   • Smoking status: Former Smoker     Packs/day: 0.25     Years: 10.00     Pack years: 2.50     Types: Cigarettes     Quit date:      Years since quittin.8   • Smokeless tobacco: Never Used   • Tobacco comment: None since    Substance and Sexual Activity   • Alcohol use: Yes     Alcohol/week: 0.0 - 2.0 standard drinks     Frequency: Monthly or less     Drinks per session: 1 or 2     Binge frequency: Less than monthly     Comment: social   • Drug use: No   • Sexual activity: Not Currently     Birth control/protection: Post-menopausal       Current Outpatient Medications   Medication Sig Dispense Refill   • amLODIPine (NORVASC) 10 MG tablet TAKE 1 TABLET DAILY 90 tablet 3   • ASPIRIN LOW DOSE 81 MG chewable tablet Chew 1 tablet Daily. 90 tablet 0   • atorvastatin (LIPITOR) 40 MG tablet Take 1 tablet by mouth Daily. 90 tablet 1   • BIOTIN PO Take  by mouth.     • diclofenac (VOLTAREN) 1 % gel gel Apply 4 g topically to the appropriate area as directed 4 (Four) Times  "a Day As Needed (pain). 150 g 2   • metoprolol tartrate (LOPRESSOR) 50 MG tablet Take 1 tablet by mouth 2 (Two) Times a Day. 180 tablet 1   • nitroglycerin (NITROSTAT) 0.4 MG SL tablet Place 1 tablet under the tongue Every 5 (Five) Minutes As Needed for Chest Pain. 30 tablet 0   • pantoprazole (Protonix) 40 MG EC tablet Take 1 tablet by mouth Daily. 90 tablet 3   • Probiotic Product (PROBIOTIC-10 PO) Take  by mouth.     • vitamin C (ASCORBIC ACID) 250 MG tablet Take 250 mg by mouth Daily.       No current facility-administered medications for this visit.        Review of Systems    Objective   /88 (BP Location: Right arm, Patient Position: Sitting)   Pulse 82   Temp 97.3 °F (36.3 °C)   Resp 16   Ht 149.9 cm (59\")   Wt 92.7 kg (204 lb 6.4 oz)   SpO2 98%   BMI 41.28 kg/m²     Physical Exam    No results found for this or any previous visit (from the past 2016 hour(s)).  Assessment/Plan   Diagnoses and all orders for this visit:    1. Type 2 diabetes mellitus with other circulatory complication, without long-term current use of insulin (CMS/[facility]) (Primary)  -     Hemoglobin A1c; Future  -     Comprehensive Metabolic Panel; Future    2. Essential hypertension  -     Comprehensive Metabolic Panel; Future  -     metoprolol tartrate (LOPRESSOR) 50 MG tablet; Take 1 tablet by mouth 2 (Two) Times a Day.  Dispense: 180 tablet; Refill: 1    3. Coronary artery disease involving native coronary artery of native heart with angina pectoris (CMS/[facility])    4. Gastroesophageal reflux disease without esophagitis  -     pantoprazole (Protonix) 40 MG EC tablet; Take 1 tablet by mouth Daily.  Dispense: 90 tablet; Refill: 3    5. Essential hypertension  Comments:  - Will increase amlodipine to 10 mg daily.  - Patient will follow-up with cardiology in September.   Orders:  -     Comprehensive Metabolic Panel; Future  -     metoprolol tartrate (LOPRESSOR) 50 MG tablet; Take 1 tablet by mouth 2 (Two) Times a Day.  Dispense: 180 " tablet; Refill: 1    6. Hyperlipidemia, unspecified hyperlipidemia type  -     atorvastatin (LIPITOR) 40 MG tablet; Take 1 tablet by mouth Daily.  Dispense: 90 tablet; Refill: 1      Fair control the diabetes but does not appear to be ideally controlled slightly out of control.  We will check his A1c and the CMP when she follows up for the labs tomorrow.  Hypertension is borderline at this time but patient did forget to take her medicine today.  Encouraged her to take her medicine on a daily regular regimen.  GERD is much better controlled with the use of her medication.

## 2020-10-28 ENCOUNTER — RESULTS ENCOUNTER (OUTPATIENT)
Dept: FAMILY MEDICINE CLINIC | Facility: CLINIC | Age: 60
End: 2020-10-28

## 2020-10-28 DIAGNOSIS — I10 ESSENTIAL HYPERTENSION: ICD-10-CM

## 2020-10-28 DIAGNOSIS — E11.59 TYPE 2 DIABETES MELLITUS WITH OTHER CIRCULATORY COMPLICATION, WITHOUT LONG-TERM CURRENT USE OF INSULIN (HCC): ICD-10-CM

## 2020-10-29 LAB
ALBUMIN SERPL-MCNC: 4.6 G/DL (ref 3.5–5.2)
ALBUMIN/GLOB SERPL: 2 G/DL
ALP SERPL-CCNC: 102 U/L (ref 39–117)
ALT SERPL-CCNC: 26 U/L (ref 1–33)
AST SERPL-CCNC: 19 U/L (ref 1–32)
BILIRUB SERPL-MCNC: 0.4 MG/DL (ref 0–1.2)
BUN SERPL-MCNC: 11 MG/DL (ref 6–20)
BUN/CREAT SERPL: 14.7 (ref 7–25)
CALCIUM SERPL-MCNC: 9.5 MG/DL (ref 8.6–10.5)
CHLORIDE SERPL-SCNC: 106 MMOL/L (ref 98–107)
CO2 SERPL-SCNC: 25.5 MMOL/L (ref 22–29)
CREAT SERPL-MCNC: 0.75 MG/DL (ref 0.57–1)
GLOBULIN SER CALC-MCNC: 2.3 GM/DL
GLUCOSE SERPL-MCNC: 181 MG/DL (ref 65–99)
HBA1C MFR BLD: 7.1 % (ref 4.8–5.6)
POTASSIUM SERPL-SCNC: 3.9 MMOL/L (ref 3.5–5.2)
PROT SERPL-MCNC: 6.9 G/DL (ref 6–8.5)
SODIUM SERPL-SCNC: 141 MMOL/L (ref 136–145)

## 2020-12-01 ENCOUNTER — TELEPHONE (OUTPATIENT)
Dept: CARDIOLOGY | Facility: CLINIC | Age: 60
End: 2020-12-01

## 2020-12-01 NOTE — TELEPHONE ENCOUNTER
Pt called. She will be having upcomming dental work done. She said that you told her to call you if she needs to have dental work done.    Does she need an anti-biotic prior to her dental work?  We saw her last on 9/21/20.  Please advise.    Thanks,  Macarena

## 2021-01-20 ENCOUNTER — OFFICE VISIT (OUTPATIENT)
Dept: FAMILY MEDICINE CLINIC | Facility: CLINIC | Age: 61
End: 2021-01-20

## 2021-01-20 VITALS
OXYGEN SATURATION: 99 % | DIASTOLIC BLOOD PRESSURE: 84 MMHG | SYSTOLIC BLOOD PRESSURE: 118 MMHG | WEIGHT: 204 LBS | BODY MASS INDEX: 41.12 KG/M2 | HEART RATE: 91 BPM | HEIGHT: 59 IN | TEMPERATURE: 97.8 F

## 2021-01-20 DIAGNOSIS — E11.59 TYPE 2 DIABETES MELLITUS WITH OTHER CIRCULATORY COMPLICATION, WITHOUT LONG-TERM CURRENT USE OF INSULIN (HCC): Primary | ICD-10-CM

## 2021-01-20 PROCEDURE — 99214 OFFICE O/P EST MOD 30 MIN: CPT | Performed by: INTERNAL MEDICINE

## 2021-01-20 RX ORDER — MULTIPLE VITAMINS W/ MINERALS TAB 9MG-400MCG
1 TAB ORAL DAILY
COMMUNITY

## 2021-01-20 RX ORDER — METFORMIN HYDROCHLORIDE 500 MG/1
1000 TABLET, EXTENDED RELEASE ORAL
Qty: 60 TABLET | Refills: 2 | Status: SHIPPED | OUTPATIENT
Start: 2021-01-20 | End: 2021-03-24 | Stop reason: SDUPTHER

## 2021-01-20 NOTE — PROGRESS NOTES
Subjective   Kimberli Magallanes is a 60 y.o. female.     Chief Complaint   Patient presents with   • Diabetes       History of Present Illness     Here for follow-up of diabetes.  Patient states to have been compliant with medications and trying to exercise more.  has been having elevated blood sugars for the last 2 weeks with no change in medication other than MVI, biotin, probiotic.  Blood sugar monitoring - patient states has been running on average 200.  With a range of 198-250 in the last 2 weeks.  No episodes of hypoglycemia, nausea, vomiting, new rashes, syncope or other issues.  Has not been able to follow the diet and exercise as well as she wants.  Last A1c 10/28/2020 7.1%.     Follow-up for hypertension.  Currently, has been feeling well and asymptomatic without any headaches,vision changes, cough, chest pain, shortness of breath, swelling, focal neurologic deficit, memory loss or syncope.  Has been taking the medications regularly and adherent with the regimen of amlodipine 10 mg, metoprolol 50 mg BID. Denies medication side effects and no significant interval events.       Follow-up for cholesterol.  Currently, has been feeling well without any myalgias, muscle aches, weakness, numbness, chest pain, short of breath or other issues.  Currently, is adherent with medication regimen of atorvastatin 40 mg and denies medication side effects. Is due for lab follow-up.  Last LDL 6/5/2020.     The following portions of the patient's history were reviewed and updated as appropriate: allergies, current medications, past family history, past medical history, past social history, past surgical history and problem list.    Depression Screen:  No flowsheet data found.    Past Medical History:   Diagnosis Date   • Abnormal electrocardiogram    • Adverse reaction to ACE inhibitor drug    • Adverse reaction to angiotensin 2 receptor antagonist    • BMI 40.0-44.9, adult (CMS/[facility])    • Diabetes mellitus (CMS/[facility])     Diet  Controlled   • Encounter for annual health examination    • Encounter for hepatitis C screening test for low risk patient    • Esophageal reflux    • Foot deformity, acquired, right    • Glucosuria    • Hematuria, microscopic    • Hyperlipidemia    • Hypertension    • Refused influenza vaccine    • Visit for screening mammogram        Past Surgical History:   Procedure Laterality Date   • BREAST LUMPECTOMY Left 2019    benign calcification   • CARDIAC CATHETERIZATION      x1 stent LAD at [facility]   • COLONOSCOPY      16 normal, repeat in 10 years   • CORONARY STENT PLACEMENT  01/15/2020       Family History   Problem Relation Age of Onset   • Heart attack Mother 67   • Stroke Mother    • Hypertension Mother    • Heart disease Mother 67   • Diabetes Father    • Hypertension Father    • Heart disease Father 58   • No Known Problems Other    • Heart attack Maternal Grandmother        Social History     Socioeconomic History   • Marital status: Single     Spouse name: Not on file   • Number of children: Not on file   • Years of education: Not on file   • Highest education level: Not on file   Tobacco Use   • Smoking status: Former Smoker     Packs/day: 0.25     Years: 10.00     Pack years: 2.50     Types: Cigarettes     Quit date:      Years since quittin.0   • Smokeless tobacco: Never Used   • Tobacco comment: None since    Substance and Sexual Activity   • Alcohol use: Yes     Alcohol/week: 0.0 - 2.0 standard drinks     Frequency: Monthly or less     Drinks per session: 1 or 2     Binge frequency: Less than monthly     Comment: social   • Drug use: No   • Sexual activity: Not Currently     Birth control/protection: Post-menopausal       Current Outpatient Medications   Medication Sig Dispense Refill   • amLODIPine (NORVASC) 10 MG tablet TAKE 1 TABLET DAILY 90 tablet 3   • ASPIRIN LOW DOSE 81 MG chewable tablet Chew 1 tablet Daily. 90 tablet 0   • atorvastatin (LIPITOR) 40 MG tablet Take 1  tablet by mouth Daily. 90 tablet 1   • BIOTIN PO Take  by mouth.     • diclofenac (VOLTAREN) 1 % gel gel Apply 4 g topically to the appropriate area as directed 4 (Four) Times a Day As Needed (pain). 150 g 2   • metoprolol tartrate (LOPRESSOR) 50 MG tablet Take 1 tablet by mouth 2 (Two) Times a Day. 180 tablet 1   • multivitamin with minerals (CENTRUM ADULTS PO) Take 1 tablet by mouth Daily.     • nitroglycerin (NITROSTAT) 0.4 MG SL tablet Place 1 tablet under the tongue Every 5 (Five) Minutes As Needed for Chest Pain. 30 tablet 0   • pantoprazole (Protonix) 40 MG EC tablet Take 1 tablet by mouth Daily. 90 tablet 3   • Probiotic Product (PROBIOTIC-10 PO) Take  by mouth.     • metFORMIN ER (GLUCOPHAGE-XR) 500 MG 24 hr tablet Take 2 tablets by mouth Daily With Breakfast. 60 tablet 2   • vitamin C (ASCORBIC ACID) 250 MG tablet Take 250 mg by mouth Daily.       No current facility-administered medications for this visit.        Review of Systems   Constitutional: Negative for activity change, appetite change, fatigue, fever, unexpected weight gain and unexpected weight loss.   HENT: Negative for nosebleeds, rhinorrhea, trouble swallowing and voice change.    Eyes: Negative for visual disturbance.   Respiratory: Negative for cough, chest tightness, shortness of breath and wheezing.    Cardiovascular: Negative for chest pain, palpitations and leg swelling.   Gastrointestinal: Negative for abdominal pain, blood in stool, constipation, diarrhea, nausea, vomiting, GERD and indigestion.   Genitourinary: Negative for dysuria, frequency and hematuria.   Musculoskeletal: Negative for arthralgias, back pain and myalgias.   Skin: Negative for rash and bruise.   Neurological: Negative for dizziness, tremors, weakness, light-headedness, numbness, headache and memory problem.   Hematological: Negative for adenopathy. Does not bruise/bleed easily.   Psychiatric/Behavioral: Negative for sleep disturbance and depressed mood. The patient  "is not nervous/anxious.        Objective   /84 (BP Location: Right arm, Patient Position: Sitting, Cuff Size: Adult)   Pulse 91   Temp 97.8 °F (36.6 °C) (Temporal)   Ht 149.9 cm (59.02\")   Wt 92.5 kg (204 lb)   SpO2 99%   BMI 41.18 kg/m²     Physical Exam  Vitals signs and nursing note reviewed.   Constitutional:       General: She is not in acute distress.     Appearance: She is well-developed. She is obese. She is not diaphoretic.   HENT:      Head: Normocephalic and atraumatic.      Right Ear: External ear normal.      Left Ear: External ear normal.      Nose: Nose normal.   Eyes:      Conjunctiva/sclera: Conjunctivae normal.      Pupils: Pupils are equal, round, and reactive to light.   Neck:      Musculoskeletal: Normal range of motion and neck supple.      Thyroid: No thyromegaly.      Trachea: No tracheal deviation.   Cardiovascular:      Rate and Rhythm: Normal rate and regular rhythm.      Heart sounds: Normal heart sounds. No murmur. No friction rub. No gallop.    Pulmonary:      Effort: Pulmonary effort is normal. No respiratory distress.      Breath sounds: Normal breath sounds.   Abdominal:      General: Bowel sounds are normal.      Palpations: Abdomen is soft. There is no mass.      Tenderness: There is no abdominal tenderness. There is no guarding.   Musculoskeletal: Normal range of motion.   Lymphadenopathy:      Cervical: No cervical adenopathy.   Skin:     General: Skin is warm and dry.      Capillary Refill: Capillary refill takes less than 2 seconds.      Findings: No rash.   Neurological:      Mental Status: She is alert and oriented to person, place, and time.      Motor: No abnormal muscle tone.      Deep Tendon Reflexes: Reflexes normal.   Psychiatric:         Behavior: Behavior normal.         Thought Content: Thought content normal.         Judgment: Judgment normal.         No results found for this or any previous visit (from the past 2016 hour(s)).  Assessment/Plan "   Diagnoses and all orders for this visit:    1. Type 2 diabetes mellitus with other circulatory complication, without long-term current use of insulin (CMS/[facility]) (Primary)  -     metFORMIN ER (GLUCOPHAGE-XR) 500 MG 24 hr tablet; Take 2 tablets by mouth Daily With Breakfast.  Dispense: 60 tablet; Refill: 2    Discussed with patient the recent increase in her blood sugars indicating uncontrolled diabetes.  I believe she is likely passed through a honeymoon phase for diabetes.  We will start on Metformin starting at 1000 mg once a day of extended release version and follow-up in approximately 2 months which time we will recheck her labs including an A1c and CMP and lipid panel.  Will adjust at that time if indicated.  Continue to monitor the blood sugars at home and if there is any problems she is to contact us.           · COVID-19 Precautions - Patient was compliant in wearing a mask. When I saw the patient, I used appropriate personal protective equipment (PPE) including mask and eye shield (standard procedure).  Additionally, I used gown and gloves if indicated.  Hand hygiene was completed before and after seeing the patient.  · Dictated utilizing Dragon Dictation

## 2021-01-21 ENCOUNTER — TELEPHONE (OUTPATIENT)
Dept: FAMILY MEDICINE CLINIC | Facility: CLINIC | Age: 61
End: 2021-01-21

## 2021-01-21 DIAGNOSIS — Z12.31 VISIT FOR SCREENING MAMMOGRAM: Primary | ICD-10-CM

## 2021-01-21 NOTE — TELEPHONE ENCOUNTER
Called patient to schedule next appointment. Patient expressed wish to get her yearly screening mammogram. She will need a new order placed and she would like to go to [facility].

## 2021-01-28 ENCOUNTER — TRANSCRIBE ORDERS (OUTPATIENT)
Dept: ADMINISTRATIVE | Facility: HOSPITAL | Age: 61
End: 2021-01-28

## 2021-01-28 DIAGNOSIS — Z12.31 SCREENING MAMMOGRAM, ENCOUNTER FOR: Primary | ICD-10-CM

## 2021-03-24 ENCOUNTER — OFFICE VISIT (OUTPATIENT)
Dept: FAMILY MEDICINE CLINIC | Facility: CLINIC | Age: 61
End: 2021-03-24

## 2021-03-24 ENCOUNTER — BULK ORDERING (OUTPATIENT)
Dept: CASE MANAGEMENT | Facility: OTHER | Age: 61
End: 2021-03-24

## 2021-03-24 VITALS
OXYGEN SATURATION: 98 % | TEMPERATURE: 98.9 F | BODY MASS INDEX: 40.72 KG/M2 | HEART RATE: 74 BPM | WEIGHT: 202 LBS | SYSTOLIC BLOOD PRESSURE: 128 MMHG | DIASTOLIC BLOOD PRESSURE: 82 MMHG | HEIGHT: 59 IN

## 2021-03-24 DIAGNOSIS — Z01.419 WELL WOMAN EXAM: ICD-10-CM

## 2021-03-24 DIAGNOSIS — I10 ESSENTIAL HYPERTENSION: ICD-10-CM

## 2021-03-24 DIAGNOSIS — E11.59 TYPE 2 DIABETES MELLITUS WITH OTHER CIRCULATORY COMPLICATION, WITHOUT LONG-TERM CURRENT USE OF INSULIN (HCC): Primary | ICD-10-CM

## 2021-03-24 DIAGNOSIS — E78.5 HYPERLIPIDEMIA, UNSPECIFIED HYPERLIPIDEMIA TYPE: ICD-10-CM

## 2021-03-24 DIAGNOSIS — Z23 IMMUNIZATION DUE: ICD-10-CM

## 2021-03-24 DIAGNOSIS — K21.9 GASTROESOPHAGEAL REFLUX DISEASE WITHOUT ESOPHAGITIS: ICD-10-CM

## 2021-03-24 PROCEDURE — 99214 OFFICE O/P EST MOD 30 MIN: CPT | Performed by: INTERNAL MEDICINE

## 2021-03-24 RX ORDER — METOPROLOL TARTRATE 50 MG/1
50 TABLET, FILM COATED ORAL 2 TIMES DAILY
Qty: 180 TABLET | Refills: 1 | Status: SHIPPED | OUTPATIENT
Start: 2021-03-24 | End: 2021-08-30

## 2021-03-24 RX ORDER — AMLODIPINE BESYLATE 10 MG/1
10 TABLET ORAL DAILY
Qty: 90 TABLET | Refills: 3 | Status: SHIPPED | OUTPATIENT
Start: 2021-03-24 | End: 2022-01-04 | Stop reason: SDUPTHER

## 2021-03-24 RX ORDER — METFORMIN HYDROCHLORIDE 500 MG/1
1000 TABLET, EXTENDED RELEASE ORAL
Qty: 180 TABLET | Refills: 1 | Status: SHIPPED | OUTPATIENT
Start: 2021-03-24 | End: 2021-04-01 | Stop reason: SDUPTHER

## 2021-03-24 RX ORDER — PANTOPRAZOLE SODIUM 40 MG/1
40 TABLET, DELAYED RELEASE ORAL DAILY
Qty: 90 TABLET | Refills: 3 | Status: SHIPPED | OUTPATIENT
Start: 2021-03-24 | End: 2022-01-04 | Stop reason: SDUPTHER

## 2021-03-24 RX ORDER — ATORVASTATIN CALCIUM 40 MG/1
40 TABLET, FILM COATED ORAL DAILY
Qty: 90 TABLET | Refills: 3 | Status: SHIPPED | OUTPATIENT
Start: 2021-03-24 | End: 2022-01-04 | Stop reason: SDUPTHER

## 2021-03-24 NOTE — PROGRESS NOTES
Subjective   Kimberli Magallanes is a 60 y.o. female.     Chief Complaint   Patient presents with   • Diabetes       History of Present Illness   Here for follow-up of diabetes.  Patient states to have been compliant with medications and trying to exercise more.  has been having elevated blood sugars for the last 2 weeks with no change in medication other than MVI, biotin, probiotic.  Blood sugar monitoring - patient states has been running on average 130.  With a range of 101-178 in the last 2 weeks.  No episodes of hypoglycemia, nausea, vomiting, new rashes, syncope or other issues.  Was started on metformin XR 1000 mg once a day 1/20/2021.  Last A1c 10/28/2020 7.1%.     Follow-up for hypertension.  Currently, has been feeling well and asymptomatic without any headaches,vision changes, cough, chest pain, shortness of breath, swelling, focal neurologic deficit, memory loss or syncope.  Has been taking the medications regularly and adherent with the regimen of amlodipine 10 mg, metoprolol 50 mg BID. Denies medication side effects and no significant interval events.       Follow-up for cholesterol.  Currently, has been feeling well without any myalgias, muscle aches, weakness, numbness, chest pain, short of breath or other issues.  Currently, is adherent with medication regimen of atorvastatin 40 mg and denies medication side effects. Is due for lab follow-up. Last LDL 6/5/2020.       The following portions of the patient's history were reviewed and updated as appropriate: allergies, current medications, past family history, past medical history, past social history, past surgical history and problem list.    Depression Screen:  No flowsheet data found.    Past Medical History:   Diagnosis Date   • Abnormal electrocardiogram    • Adverse reaction to ACE inhibitor drug    • Adverse reaction to angiotensin 2 receptor antagonist    • BMI 40.0-44.9, adult (CMS/[facility])    • Diabetes mellitus (CMS/[facility])     Diet Controlled   •  Encounter for annual health examination    • Encounter for hepatitis C screening test for low risk patient    • Esophageal reflux    • Foot deformity, acquired, right    • Glucosuria    • Hematuria, microscopic    • Hyperlipidemia    • Hypertension    • Refused influenza vaccine    • Visit for screening mammogram        Past Surgical History:   Procedure Laterality Date   • BREAST LUMPECTOMY Left 2019    benign calcification   • CARDIAC CATHETERIZATION      x1 stent LAD at [facility]   • COLONOSCOPY      16 normal, repeat in 10 years   • CORONARY STENT PLACEMENT  01/15/2020       Family History   Problem Relation Age of Onset   • Heart attack Mother 67   • Stroke Mother    • Hypertension Mother    • Heart disease Mother 67   • Diabetes Father    • Hypertension Father    • Heart disease Father 58   • No Known Problems Other    • Heart attack Maternal Grandmother        Social History     Socioeconomic History   • Marital status: Single     Spouse name: Not on file   • Number of children: Not on file   • Years of education: Not on file   • Highest education level: Not on file   Tobacco Use   • Smoking status: Former Smoker     Packs/day: 0.25     Years: 10.00     Pack years: 2.50     Types: Cigarettes     Quit date:      Years since quittin.2   • Smokeless tobacco: Never Used   • Tobacco comment: None since    Substance and Sexual Activity   • Alcohol use: Yes     Alcohol/week: 0.0 - 2.0 standard drinks     Comment: social   • Drug use: No   • Sexual activity: Not Currently     Birth control/protection: Post-menopausal       Current Outpatient Medications   Medication Sig Dispense Refill   • amLODIPine (NORVASC) 10 MG tablet Take 1 tablet by mouth Daily. 90 tablet 3   • ASPIRIN LOW DOSE 81 MG chewable tablet Chew 1 tablet Daily. 90 tablet 0   • atorvastatin (LIPITOR) 40 MG tablet Take 1 tablet by mouth Daily. 90 tablet 3   • BIOTIN PO Take  by mouth.     • diclofenac (VOLTAREN) 1 % gel gel  Apply 4 g topically to the appropriate area as directed 4 (Four) Times a Day As Needed (pain). 150 g 2   • metFORMIN ER (GLUCOPHAGE-XR) 500 MG 24 hr tablet Take 2 tablets by mouth Daily With Breakfast. 180 tablet 1   • metoprolol tartrate (LOPRESSOR) 50 MG tablet Take 1 tablet by mouth 2 (Two) Times a Day. 180 tablet 1   • multivitamin with minerals (CENTRUM ADULTS PO) Take 1 tablet by mouth Daily.     • nitroglycerin (NITROSTAT) 0.4 MG SL tablet Place 1 tablet under the tongue Every 5 (Five) Minutes As Needed for Chest Pain. 30 tablet 0   • pantoprazole (Protonix) 40 MG EC tablet Take 1 tablet by mouth Daily. 90 tablet 3   • Probiotic Product (PROBIOTIC-10 PO) Take  by mouth.       No current facility-administered medications for this visit.       Review of Systems   Constitutional: Negative for activity change, appetite change, fatigue, fever, unexpected weight gain and unexpected weight loss.   HENT: Negative for nosebleeds, rhinorrhea, trouble swallowing and voice change.    Eyes: Negative for visual disturbance.   Respiratory: Negative for cough, chest tightness, shortness of breath and wheezing.    Cardiovascular: Negative for chest pain, palpitations and leg swelling.   Gastrointestinal: Negative for abdominal pain, blood in stool, constipation, diarrhea, nausea, vomiting, GERD and indigestion.   Genitourinary: Negative for dysuria, frequency and hematuria.   Musculoskeletal: Negative for arthralgias, back pain and myalgias.   Skin: Negative for rash and bruise.   Neurological: Negative for dizziness, tremors, weakness, light-headedness, numbness, headache and memory problem.   Hematological: Negative for adenopathy. Does not bruise/bleed easily.   Psychiatric/Behavioral: Negative for sleep disturbance and depressed mood. The patient is not nervous/anxious.        Objective   /82 (BP Location: Left arm, Patient Position: Sitting, Cuff Size: Large Adult)   Pulse 74   Temp 98.9 °F (37.2 °C) (Temporal)   " Ht 149.9 cm (59.02\")   Wt 91.6 kg (202 lb)   SpO2 98%   BMI 40.78 kg/m²     Physical Exam  Vitals and nursing note reviewed.   Constitutional:       General: She is not in acute distress.     Appearance: She is well-developed. She is obese. She is not diaphoretic.   HENT:      Head: Normocephalic and atraumatic.      Right Ear: External ear normal.      Left Ear: External ear normal.      Nose: Nose normal.   Eyes:      Conjunctiva/sclera: Conjunctivae normal.      Pupils: Pupils are equal, round, and reactive to light.   Neck:      Thyroid: No thyromegaly.      Trachea: No tracheal deviation.   Cardiovascular:      Rate and Rhythm: Normal rate and regular rhythm.      Heart sounds: Normal heart sounds. No murmur heard.   No friction rub. No gallop.    Pulmonary:      Effort: Pulmonary effort is normal. No respiratory distress.      Breath sounds: Normal breath sounds.   Abdominal:      General: Bowel sounds are normal.      Palpations: Abdomen is soft. There is no mass.      Tenderness: There is no abdominal tenderness. There is no guarding.   Musculoskeletal:         General: Normal range of motion.      Cervical back: Normal range of motion and neck supple.   Lymphadenopathy:      Cervical: No cervical adenopathy.   Skin:     General: Skin is warm and dry.      Capillary Refill: Capillary refill takes less than 2 seconds.      Findings: No rash.   Neurological:      Mental Status: She is alert and oriented to person, place, and time.      Motor: No abnormal muscle tone.      Deep Tendon Reflexes: Reflexes normal.   Psychiatric:         Behavior: Behavior normal.         Thought Content: Thought content normal.         Judgment: Judgment normal.         No results found for this or any previous visit (from the past 2016 hour(s)).  Assessment/Plan   Diagnoses and all orders for this visit:    1. Type 2 diabetes mellitus with other circulatory complication, without long-term current use of insulin (CMS/[facility]) " (Primary)  -     metFORMIN ER (GLUCOPHAGE-XR) 500 MG 24 hr tablet; Take 2 tablets by mouth Daily With Breakfast.  Dispense: 180 tablet; Refill: 1  -     Hemoglobin A1c  -     Comprehensive Metabolic Panel  -     Lipid Panel    2. Essential hypertension  -     amLODIPine (NORVASC) 10 MG tablet; Take 1 tablet by mouth Daily.  Dispense: 90 tablet; Refill: 3  -     metoprolol tartrate (LOPRESSOR) 50 MG tablet; Take 1 tablet by mouth 2 (Two) Times a Day.  Dispense: 180 tablet; Refill: 1  -     Comprehensive Metabolic Panel  -     Lipid Panel    3. Hyperlipidemia, unspecified hyperlipidemia type  -     atorvastatin (LIPITOR) 40 MG tablet; Take 1 tablet by mouth Daily.  Dispense: 90 tablet; Refill: 3  -     Comprehensive Metabolic Panel  -     Lipid Panel    4. Gastroesophageal reflux disease without esophagitis  -     pantoprazole (Protonix) 40 MG EC tablet; Take 1 tablet by mouth Daily.  Dispense: 90 tablet; Refill: 3    5. Well woman exam  -     Ambulatory Referral to Gynecology    Hypertension is controlled.  Continue the current medications and check the labs as ordered.  Diabetes better controlled with the metformin and tolerating well.  Await the A1c results and will adjust as needed based on the results.           · COVID-19 Precautions - Patient was compliant in wearing a mask. When I saw the patient, I used appropriate personal protective equipment (PPE) including mask and eye shield (standard procedure).  Additionally, I used gown and gloves if indicated.  Hand hygiene was completed before and after seeing the patient.  · Dictated utilizing Dragon Dictation

## 2021-03-25 ENCOUNTER — APPOINTMENT (OUTPATIENT)
Dept: MAMMOGRAPHY | Facility: HOSPITAL | Age: 61
End: 2021-03-25

## 2021-03-25 LAB
ALBUMIN SERPL-MCNC: 4.7 G/DL (ref 3.5–5.2)
ALBUMIN/GLOB SERPL: 1.8 G/DL
ALP SERPL-CCNC: 98 U/L (ref 39–117)
ALT SERPL-CCNC: 33 U/L (ref 1–33)
AST SERPL-CCNC: 25 U/L (ref 1–32)
BILIRUB SERPL-MCNC: 0.5 MG/DL (ref 0–1.2)
BUN SERPL-MCNC: 16 MG/DL (ref 6–20)
BUN/CREAT SERPL: 17.8 (ref 7–25)
CALCIUM SERPL-MCNC: 9.9 MG/DL (ref 8.6–10.5)
CHLORIDE SERPL-SCNC: 107 MMOL/L (ref 98–107)
CHOLEST SERPL-MCNC: 147 MG/DL (ref 0–200)
CO2 SERPL-SCNC: 26.6 MMOL/L (ref 22–29)
CREAT SERPL-MCNC: 0.9 MG/DL (ref 0.57–1)
GLOBULIN SER CALC-MCNC: 2.6 GM/DL
GLUCOSE SERPL-MCNC: 162 MG/DL (ref 65–99)
HBA1C MFR BLD: 7.7 % (ref 4.8–5.6)
HDLC SERPL-MCNC: 64 MG/DL (ref 40–60)
LDLC SERPL CALC-MCNC: 67 MG/DL (ref 0–100)
POTASSIUM SERPL-SCNC: 4.2 MMOL/L (ref 3.5–5.2)
PROT SERPL-MCNC: 7.3 G/DL (ref 6–8.5)
SODIUM SERPL-SCNC: 144 MMOL/L (ref 136–145)
TRIGL SERPL-MCNC: 86 MG/DL (ref 0–150)
VLDLC SERPL CALC-MCNC: 16 MG/DL (ref 5–40)

## 2021-04-01 DIAGNOSIS — E11.59 TYPE 2 DIABETES MELLITUS WITH OTHER CIRCULATORY COMPLICATION, WITHOUT LONG-TERM CURRENT USE OF INSULIN (HCC): ICD-10-CM

## 2021-04-01 RX ORDER — METFORMIN HYDROCHLORIDE 500 MG/1
1000 TABLET, EXTENDED RELEASE ORAL
Qty: 360 TABLET | Refills: 1 | Status: SHIPPED | OUTPATIENT
Start: 2021-04-01 | End: 2021-06-08 | Stop reason: SDUPTHER

## 2021-04-17 DIAGNOSIS — E11.59 TYPE 2 DIABETES MELLITUS WITH OTHER CIRCULATORY COMPLICATION, WITHOUT LONG-TERM CURRENT USE OF INSULIN (HCC): ICD-10-CM

## 2021-04-19 RX ORDER — METFORMIN HYDROCHLORIDE 500 MG/1
TABLET, EXTENDED RELEASE ORAL
Qty: 60 TABLET | Refills: 0 | OUTPATIENT
Start: 2021-04-19

## 2021-05-13 ENCOUNTER — OFFICE VISIT (OUTPATIENT)
Dept: CARDIOLOGY | Facility: CLINIC | Age: 61
End: 2021-05-13

## 2021-05-13 VITALS
SYSTOLIC BLOOD PRESSURE: 126 MMHG | WEIGHT: 205 LBS | HEIGHT: 59 IN | HEART RATE: 65 BPM | BODY MASS INDEX: 41.33 KG/M2 | DIASTOLIC BLOOD PRESSURE: 82 MMHG

## 2021-05-13 DIAGNOSIS — E11.59 TYPE 2 DIABETES MELLITUS WITH OTHER CIRCULATORY COMPLICATION, WITHOUT LONG-TERM CURRENT USE OF INSULIN (HCC): ICD-10-CM

## 2021-05-13 DIAGNOSIS — E78.5 HYPERLIPIDEMIA, UNSPECIFIED HYPERLIPIDEMIA TYPE: ICD-10-CM

## 2021-05-13 DIAGNOSIS — R10.13 EPIGASTRIC PAIN: Primary | ICD-10-CM

## 2021-05-13 DIAGNOSIS — I25.119 CORONARY ARTERY DISEASE INVOLVING NATIVE CORONARY ARTERY OF NATIVE HEART WITH ANGINA PECTORIS (HCC): ICD-10-CM

## 2021-05-13 DIAGNOSIS — I10 ESSENTIAL HYPERTENSION: ICD-10-CM

## 2021-05-13 PROCEDURE — 93000 ELECTROCARDIOGRAM COMPLETE: CPT | Performed by: INTERNAL MEDICINE

## 2021-05-13 PROCEDURE — 99214 OFFICE O/P EST MOD 30 MIN: CPT | Performed by: INTERNAL MEDICINE

## 2021-05-13 NOTE — PROGRESS NOTES
Kimberli Magallanes  1960  Date of Office Visit: 05/13/21  Encounter Provider: Lex Morales MD  Place of Service: [facility] CARDIOLOGY      CHIEF COMPLAINT:  Coronary artery disease    HISTORY OF PRESENT ILLNESS:  60-year-old female with a medical history of coronary artery disease, non-ST elevation MI, diabetes mellitus, mild hyperlipidemia, and hypertension along with angioedema with ACE inhibitor who presents to me in follow-up.. She was recently, in 01/2020, in the hospital at [place] with chest discomfort and a NSTEMI. Her troponin peaked based on the last measurement of about 0.7. She was noted to have a very proximal 1st diagonal versus ramus with an 80% stenosis that was stented with an [place] 2.5 x 18 mm drug-eluting stent and postdilated to 2.75. There was mild mid LAD disease, about 30%. The right coronary was nondominant. Her ejection fraction on ventriculogram was normal but she did not have an echocardiogram.    She has been doing well since our last visit.  Her main complaint at this point in time is epigastric discomfort.  She describes it as a burning and occasionally pressure-like sensation in her epigastric area.  It does not increase with activity.  It does not seem to increase with p.o. intake.  Her EKG is unchanged from prior.  Tolerating her current medical regimen without any difficulty.           Review of Systems   Constitutional: Negative for fever and malaise/fatigue.   HENT: Negative for nosebleeds and sore throat.    Eyes: Negative for blurred vision and double vision.   Cardiovascular: Negative for chest pain, claudication, palpitations and syncope.   Respiratory: Negative for cough, shortness of breath and snoring.    Endocrine: Negative for cold intolerance, heat intolerance and polydipsia.   Skin: Negative for itching, poor wound healing and rash.   Musculoskeletal: Negative for joint pain, joint swelling, muscle weakness and myalgias.    Gastrointestinal: Negative for abdominal pain, melena, nausea and vomiting.   Neurological: Negative for light-headedness, loss of balance, seizures, vertigo and weakness.   Psychiatric/Behavioral: Negative for altered mental status and depression.        Past Medical History:   Diagnosis Date   • Abnormal electrocardiogram    • Adverse reaction to ACE inhibitor drug    • Adverse reaction to angiotensin 2 receptor antagonist    • BMI 40.0-44.9, adult (CMS/Former[facility])    • Diabetes mellitus (CMS/[facility])     Diet Controlled   • Encounter for annual health examination    • Encounter for hepatitis C screening test for low risk patient    • Esophageal reflux    • Foot deformity, acquired, right    • Glucosuria    • Hematuria, microscopic    • Hyperlipidemia    • Hypertension    • Refused influenza vaccine    • Visit for screening mammogram        The following portions of the patient's history were reviewed and updated as appropriate: Social history , Family history and Surgical history     Current Outpatient Medications on File Prior to Visit   Medication Sig Dispense Refill   • amLODIPine (NORVASC) 10 MG tablet Take 1 tablet by mouth Daily. 90 tablet 3   • ASPIRIN LOW DOSE 81 MG chewable tablet Chew 1 tablet Daily. 90 tablet 0   • atorvastatin (LIPITOR) 40 MG tablet Take 1 tablet by mouth Daily. 90 tablet 3   • BIOTIN PO Take  by mouth.     • diclofenac (VOLTAREN) 1 % gel gel Apply 4 g topically to the appropriate area as directed 4 (Four) Times a Day As Needed (pain). 150 g 2   • metFORMIN ER (GLUCOPHAGE-XR) 500 MG 24 hr tablet Take 2 tablets by mouth Daily With Breakfast. 360 tablet 1   • metoprolol tartrate (LOPRESSOR) 50 MG tablet Take 1 tablet by mouth 2 (Two) Times a Day. 180 tablet 1   • multivitamin with minerals (CENTRUM ADULTS PO) Take 1 tablet by mouth Daily.     • nitroglycerin (NITROSTAT) 0.4 MG SL tablet Place 1 tablet under the tongue Every 5 (Five) Minutes As Needed for Chest Pain. 30 tablet 0   • pantoprazole  "(Protonix) 40 MG EC tablet Take 1 tablet by mouth Daily. 90 tablet 3   • Probiotic Product (PROBIOTIC-10 PO) Take  by mouth.       No current facility-administered medications on file prior to visit.       Allergies   Allergen Reactions   • Lisinopril Anaphylaxis and Swelling   • Losartan Anaphylaxis and Swelling   • Citrullus Vulgaris Unknown - Low Severity   • Eggs Or Egg-Derived Products Unknown - Low Severity       Vitals:    05/13/21 1559   BP: 126/82   Pulse: 65   Weight: 93 kg (205 lb)   Height: 149.9 cm (59\")     Physical Exam  Constitutional:       Appearance: She is well-developed.   HENT:      Head: Normocephalic and atraumatic.   Eyes:      General: No scleral icterus.     Conjunctiva/sclera: Conjunctivae normal.   Neck:      Thyroid: No thyromegaly.      Vascular: Normal carotid pulses. No carotid bruit, hepatojugular reflux or JVD.      Trachea: No tracheal deviation.   Cardiovascular:      Rate and Rhythm: Normal rate and regular rhythm.      Pulses:           Carotid pulses are 2+ on the right side and 2+ on the left side.       Radial pulses are 2+ on the right side and 2+ on the left side.        Femoral pulses are 2+ on the right side and 2+ on the left side.       Dorsalis pedis pulses are 2+ on the right side and 2+ on the left side.        Posterior tibial pulses are 2+ on the right side and 2+ on the left side.      Heart sounds: No murmur heard.   No friction rub. No gallop.    Pulmonary:      Effort: No respiratory distress.      Breath sounds: Normal breath sounds. No decreased breath sounds, wheezing, rhonchi or rales.   Chest:      Chest wall: No tenderness.   Abdominal:      General: Bowel sounds are normal. There is no distension.      Palpations: Abdomen is soft.      Tenderness: There is no abdominal tenderness. There is no rebound.   Musculoskeletal:         General: No deformity.      Cervical back: Normal range of motion and neck supple.   Skin:     Findings: No erythema or rash. "   Neurological:      Mental Status: She is alert and oriented to person, place, and time.      Sensory: No sensory deficit.   Psychiatric:         Behavior: Behavior normal.         Lab Results   Component Value Date    WBC 8.4 07/21/2020    HGB 14.0 07/21/2020    HCT 42.4 07/21/2020    MCV 90 07/21/2020     07/21/2020       Lab Results   Component Value Date    GLUCOSE 115 (H) 06/09/2014    BUN 16 03/24/2021    CREATININE 0.90 03/24/2021    EGFRIFNONA 64 03/24/2021    EGFRIFAFRI 78 03/24/2021    BCR 17.8 03/24/2021    K 4.2 03/24/2021    CO2 26.6 03/24/2021    CALCIUM 9.9 03/24/2021    PROTENTOTREF 7.3 03/24/2021    ALBUMIN 4.70 03/24/2021    LABIL2 1.8 03/24/2021    AST 25 03/24/2021    ALT 33 03/24/2021       Lab Results   Component Value Date    GLUCOSE 115 (H) 06/09/2014    CALCIUM 9.9 03/24/2021     03/24/2021    K 4.2 03/24/2021    CO2 26.6 03/24/2021     03/24/2021    BUN 16 03/24/2021    CREATININE 0.90 03/24/2021    EGFRIFAFRI 78 03/24/2021    EGFRIFNONA 64 03/24/2021    BCR 17.8 03/24/2021       Lab Results   Component Value Date    CHLPL 147 03/24/2021    TRIG 86 03/24/2021    HDL 64 (H) 03/24/2021    LDL 67 03/24/2021         ECG 12 Lead    Date/Time: 5/13/2021 6:18 PM  Performed by: Lex Morales MD  Authorized by: Lex Morales MD   Comparison: compared with previous ECG from 9/21/2020  Similar to previous ECG  Rhythm: sinus rhythm  Rate: normal  QRS axis: left  Other findings: right atrial abnormality    Clinical impression: non-specific ECG  Comments: Nonspecific T wave abnormalities lateral leads             Results for orders placed during the hospital encounter of 02/19/20    Adult Transthoracic Echo Complete W/ Cont if Necessary Per Protocol    Interpretation Summary  · Calculated right ventricular systolic pressure from tricuspid regurgitation is 22 mmHg.  · Estimated EF = 62%.  · Left ventricular systolic function is normal.  · Left ventricular wall  thickness is consistent with mild concentric hypertrophy.      DISCUSSION/SUMMARY  Very pleasant 60-year-old female with a medical history of recent non-ST elevation MI, coronary artery disease with PCI to the diagonal, hyperlipidemia, hypertension with prior angioedema with ACE inhibitor presents to me in follow-up.  She complains of epigastric discomfort that has been going on for a few months.  She states that the pantoprazole improved her reflux but she still has epigastric pain.  This is not increased with activity.  This does not sound to be angina.    1.  Non-ST elevation MI, recent. Coronary artery disease with PCI to the diagonal.  Left dominant circumflex and LAD without significant disease.   -  Continue aspirin lifelong.     -  Continue Lopressor and atorvastatin at the current dose.     - No significant fatigue on metoprolol therapy.  Heart rate is controlled.   -No additional ischemic evaluation warranted at this point in time.  2.  Essential hypertension: Blood pressure is well controlled.  3.  Hyperlipidemia: Continue atorvastatin at current dose.Lab work has been reviewed from March 2021 with LDL of 67.  No elevation in transaminases.  4.  Gastroesophageal reflux disease: Continue Protonix.  GI evaluation has been placed.

## 2021-05-18 ENCOUNTER — HOSPITAL ENCOUNTER (OUTPATIENT)
Dept: MAMMOGRAPHY | Facility: HOSPITAL | Age: 61
Discharge: HOME | End: 2021-05-18
Admitting: INTERNAL MEDICINE

## 2021-05-18 DIAGNOSIS — Z12.31 SCREENING MAMMOGRAM, ENCOUNTER FOR: ICD-10-CM

## 2021-05-18 PROCEDURE — 77063 BREAST TOMOSYNTHESIS BI: CPT

## 2021-05-18 PROCEDURE — 77067 SCR MAMMO BI INCL CAD: CPT

## 2021-05-27 ENCOUNTER — OFFICE VISIT (OUTPATIENT)
Dept: OBSTETRICS AND GYNECOLOGY | Age: 61
End: 2021-05-27

## 2021-05-27 VITALS
HEIGHT: 59 IN | BODY MASS INDEX: 41.12 KG/M2 | WEIGHT: 204 LBS | DIASTOLIC BLOOD PRESSURE: 70 MMHG | SYSTOLIC BLOOD PRESSURE: 110 MMHG

## 2021-05-27 DIAGNOSIS — Z11.51 SCREENING FOR HPV (HUMAN PAPILLOMAVIRUS): ICD-10-CM

## 2021-05-27 DIAGNOSIS — Z01.419 WELL WOMAN EXAM WITH ROUTINE GYNECOLOGICAL EXAM: Primary | ICD-10-CM

## 2021-05-27 PROCEDURE — 99386 PREV VISIT NEW AGE 40-64: CPT | Performed by: PHYSICIAN ASSISTANT

## 2021-05-27 NOTE — PROGRESS NOTES
"Subjective     Chief Complaint   Patient presents with   • Gynecologic Exam     new annual exam pap a few years ago,  m/g 2021, c/scope        History of Present Illness    Kimberli Magallanes is a 60 y.o.  who presents for annual exam.    She is new here  Last pap was 2-3 ya   Remote h/o abnormal pap in the 80's  menopausal  LMP in 2015    She is diabetic  Sees PCP for routine visits  A1C is 7.7 at last visit  Will f/u on   On metformin and \"hates it\"    Works in [employer]  Has done it for 20 years  Working from home since pandemic and hates it  Has gained weight  Has a cat that also gained 3 lbs  Working on weight changes and walking    Obstetric History:  OB History        2    Para   2    Term   2            AB        Living           SAB        TAB        Ectopic        Molar        Multiple        Live Births                   Menstrual History:     No LMP recorded. Patient is postmenopausal.         Current contraception: post menopausal status  History of abnormal Pap smear: yes - in the 's  Received Gardasil immunization: no  Perform regular self breast exam: yes - mthly  Family history of uterine or ovarian cancer: no  Family History of colon cancer: no  Family history of breast cancer: no    Mammogram: up to date.  Colonoscopy: up to date.  DEXA: not indicated.    Exercise: not active  Calcium/Vitamin D: adequate intake    The following portions of the patient's history were reviewed and updated as appropriate: allergies, current medications, past family history, past medical history, past social history, past surgical history and problem list.    Review of Systems   All other systems reviewed and are negative.      Review of Systems   Constitutional: Negative for fatigue.   Respiratory: Negative for shortness of breath.    Gastrointestinal: Negative for abdominal pain.   Genitourinary: Negative for dysuria.   Neurological: Negative for headaches. " "  Psychiatric/Behavioral: Negative for dysphoric mood.         Objective   Physical Exam    /70   Ht 148.6 cm (58.5\")   Wt 92.5 kg (204 lb)   Breastfeeding No   BMI 41.91 kg/m²   General:   alert, comfortable and no distress   Heart: regular rate and rhythm   Lungs: clear to auscultation bilaterally   Breast: normal appearance, no masses or tenderness, Inspection negative, No nipple retraction or dimpling, No nipple discharge or bleeding, No axillary or supraclavicular adenopathy, Normal to palpation without dominant masses   Neck: no adenopathy and no carotid bruit   Abdomen: {normal findings: soft, non-tender   CVA: Not performed today   Pelvis: External genitalia: normal general appearance  Vaginal: normal mucosa without prolapse or lesions  Cervix: normal appearance  Adnexa: normal bimanual exam  Uterus: normal single, nontender   Extremities: Not performed today   Neurologic: negative   Psychiatric: Normal affect, judgement, and mood     Assessment/Plan   Diagnoses and all orders for this visit:    1. Well woman exam with routine gynecological exam (Primary)  -     IGP, Aptima HPV, Rfx 16 / 18,45    2. Screening for HPV (human papillomavirus)  -     IGP, Aptima HPV, Rfx 16 / 18,45        All questions answered.  Breast self exam technique reviewed and patient encouraged to perform self-exam monthly.  Discussed healthy lifestyle modifications.  Recommended 30 minutes of aerobic exercise five times per week.  Discussed calcium needs to prevent osteoporosis.      Pap done today  utd on mammogram  Plan f/u in 1 year  Call for any issues in meantime             "

## 2021-05-31 LAB
CYTOLOGIST CVX/VAG CYTO: NORMAL
CYTOLOGY CVX/VAG DOC CYTO: NORMAL
CYTOLOGY CVX/VAG DOC THIN PREP: NORMAL
DX ICD CODE: NORMAL
HIV 1 & 2 AB SER-IMP: NORMAL
HPV I/H RISK 4 DNA CVX QL PROBE+SIG AMP: NEGATIVE
OTHER STN SPEC: NORMAL
STAT OF ADQ CVX/VAG CYTO-IMP: NORMAL

## 2021-06-08 ENCOUNTER — OFFICE VISIT (OUTPATIENT)
Dept: FAMILY MEDICINE CLINIC | Facility: CLINIC | Age: 61
End: 2021-06-08

## 2021-06-08 VITALS
DIASTOLIC BLOOD PRESSURE: 82 MMHG | TEMPERATURE: 97.9 F | WEIGHT: 205 LBS | HEART RATE: 64 BPM | OXYGEN SATURATION: 100 % | BODY MASS INDEX: 41.33 KG/M2 | HEIGHT: 59 IN | SYSTOLIC BLOOD PRESSURE: 128 MMHG

## 2021-06-08 DIAGNOSIS — E11.59 TYPE 2 DIABETES MELLITUS WITH OTHER CIRCULATORY COMPLICATION, WITHOUT LONG-TERM CURRENT USE OF INSULIN (HCC): Primary | ICD-10-CM

## 2021-06-08 DIAGNOSIS — I10 ESSENTIAL HYPERTENSION: ICD-10-CM

## 2021-06-08 DIAGNOSIS — R14.0 ABDOMINAL BLOATING: ICD-10-CM

## 2021-06-08 LAB — HBA1C MFR BLD: 6.8 %

## 2021-06-08 PROCEDURE — 83036 HEMOGLOBIN GLYCOSYLATED A1C: CPT | Performed by: INTERNAL MEDICINE

## 2021-06-08 PROCEDURE — 99214 OFFICE O/P EST MOD 30 MIN: CPT | Performed by: INTERNAL MEDICINE

## 2021-06-08 RX ORDER — METFORMIN HYDROCHLORIDE 500 MG/1
1000 TABLET, EXTENDED RELEASE ORAL 2 TIMES DAILY
Qty: 360 TABLET | Refills: 1 | Status: SHIPPED | OUTPATIENT
Start: 2021-06-08 | End: 2021-11-01

## 2021-06-08 NOTE — PROGRESS NOTES
Subjective   Kimberli Magallanes is a 60 y.o. female.     Chief Complaint   Patient presents with   • Diabetes       History of Present Illness   Here for follow-up of diabetes.  Patient states to have been compliant with medications and trying to exercise more.  has been having elevated blood sugars for the last 2 weeks with no change in medication other than MVI, biotin, probiotic.  Blood sugar monitoring - patient states has been running on average 130.  With a range of 101-178 in the last 2 weeks.  No episodes of hypoglycemia, nausea, vomiting, new rashes, syncope or other issues.  Was started on metformin XR 1000 mg once a day 1/20/2021.  Last A1c 3/24/21 7.7%.     After eats has severe bloating and gasseousness that is very uncomfortable every time she eats.  Despite the align and no changes in medications.  Noted a firmness in the upper mid abdomen and occassional heartburn.  No weight gain or loss.  No change in BM or urination.    Follow-up for hypertension.  Currently, has been feeling well and asymptomatic without any headaches,vision changes, cough, chest pain, shortness of breath, swelling, focal neurologic deficit, memory loss or syncope.  Has been taking the medications regularly and adherent with the regimen of amlodipine 10 mg, metoprolol 50 mg BID. Denies medication side effects and no significant interval events.       Follow-up for cholesterol.  Currently, has been feeling well without any myalgias, muscle aches, weakness, numbness, chest pain, short of breath or other issues.  Currently, is adherent with medication regimen of atorvastatin 40 mg and denies medication side effects. Is due for lab follow-up. Last LDL 3/24/21.    The following portions of the patient's history were reviewed and updated as appropriate: allergies, current medications, past family history, past medical history, past social history, past surgical history and problem list.    Depression Screen:  No flowsheet data found.    Past  Medical History:   Diagnosis Date   • Abnormal electrocardiogram    • Adverse reaction to ACE inhibitor drug    • Adverse reaction to angiotensin 2 receptor antagonist    • BMI 40.0-44.9, adult (CMS/[facility])    • Diabetes mellitus (CMS/[facility])     Diet Controlled   • Encounter for annual health examination    • Encounter for hepatitis C screening test for low risk patient    • Esophageal reflux    • Foot deformity, acquired, right    • Glucosuria    • Hematuria, microscopic    • Hyperlipidemia    • Hypertension    • Refused influenza vaccine    • Visit for screening mammogram        Past Surgical History:   Procedure Laterality Date   • BREAST BIOPSY Left 2019    benign calcification   • CARDIAC CATHETERIZATION      x1 stent LAD at [facility]   • COLONOSCOPY      16 normal, repeat in 10 years   • CORONARY STENT PLACEMENT  01/15/2020       Family History   Problem Relation Age of Onset   • Heart attack Mother 67   • Stroke Mother    • Hypertension Mother    • Heart disease Mother 67   • Diabetes Father    • Hypertension Father    • Heart disease Father 58   • No Known Problems Other    • Heart attack Maternal Grandmother        Social History     Socioeconomic History   • Marital status: Single     Spouse name: Not on file   • Number of children: Not on file   • Years of education: Not on file   • Highest education level: Not on file   Tobacco Use   • Smoking status: Former Smoker     Packs/day: 0.25     Years: 10.00     Pack years: 2.50     Types: Cigarettes     Quit date:      Years since quittin.4   • Smokeless tobacco: Never Used   • Tobacco comment: None since    Substance and Sexual Activity   • Alcohol use: Yes     Alcohol/week: 0.0 - 2.0 standard drinks     Comment: social   • Drug use: No   • Sexual activity: Not Currently     Birth control/protection: Post-menopausal       Current Outpatient Medications   Medication Sig Dispense Refill   • amLODIPine (NORVASC) 10 MG tablet Take 1 tablet  by mouth Daily. 90 tablet 3   • ASPIRIN LOW DOSE 81 MG chewable tablet Chew 1 tablet Daily. 90 tablet 0   • atorvastatin (LIPITOR) 40 MG tablet Take 1 tablet by mouth Daily. 90 tablet 3   • BIOTIN PO Take  by mouth.     • diclofenac (VOLTAREN) 1 % gel gel Apply 4 g topically to the appropriate area as directed 4 (Four) Times a Day As Needed (pain). 150 g 2   • metFORMIN ER (GLUCOPHAGE-XR) 500 MG 24 hr tablet Take 2 tablets by mouth 2 (two) times a day. 360 tablet 1   • metoprolol tartrate (LOPRESSOR) 50 MG tablet Take 1 tablet by mouth 2 (Two) Times a Day. 180 tablet 1   • multivitamin with minerals (CENTRUM ADULTS PO) Take 1 tablet by mouth Daily.     • nitroglycerin (NITROSTAT) 0.4 MG SL tablet Place 1 tablet under the tongue Every 5 (Five) Minutes As Needed for Chest Pain. 30 tablet 0   • pantoprazole (Protonix) 40 MG EC tablet Take 1 tablet by mouth Daily. 90 tablet 3   • Probiotic Product (PROBIOTIC-10 PO) Take  by mouth.       No current facility-administered medications for this visit.       Review of Systems   Constitutional: Negative for activity change, appetite change, fatigue, fever, unexpected weight gain and unexpected weight loss.   HENT: Negative for nosebleeds, rhinorrhea, trouble swallowing and voice change.    Eyes: Negative for visual disturbance.   Respiratory: Negative for cough, chest tightness, shortness of breath and wheezing.    Cardiovascular: Negative for chest pain, palpitations and leg swelling.   Gastrointestinal: Positive for abdominal distention. Negative for abdominal pain, blood in stool, constipation, diarrhea, nausea, vomiting, GERD and indigestion.   Genitourinary: Negative for dysuria, frequency and hematuria.   Musculoskeletal: Negative for arthralgias, back pain and myalgias.   Skin: Negative for rash and bruise.   Neurological: Negative for dizziness, tremors, weakness, light-headedness, numbness, headache and memory problem.   Hematological: Negative for adenopathy. Does  "not bruise/bleed easily.   Psychiatric/Behavioral: Negative for sleep disturbance and depressed mood. The patient is not nervous/anxious.        Objective   /82 (BP Location: Left arm, Patient Position: Sitting, Cuff Size: Adult)   Pulse 64   Temp 97.9 °F (36.6 °C) (Temporal)   Ht 148.6 cm (58.5\")   Wt 93 kg (205 lb)   SpO2 100%   BMI 42.11 kg/m²     Physical Exam  Vitals and nursing note reviewed.   Constitutional:       General: She is not in acute distress.     Appearance: She is well-developed. She is obese. She is not diaphoretic.   HENT:      Head: Normocephalic and atraumatic.      Right Ear: External ear normal.      Left Ear: External ear normal.      Nose: Nose normal.   Eyes:      Conjunctiva/sclera: Conjunctivae normal.      Pupils: Pupils are equal, round, and reactive to light.   Neck:      Thyroid: No thyromegaly.      Trachea: No tracheal deviation.   Cardiovascular:      Rate and Rhythm: Normal rate and regular rhythm.      Heart sounds: Normal heart sounds. No murmur heard.   No friction rub. No gallop.    Pulmonary:      Effort: Pulmonary effort is normal. No respiratory distress.      Breath sounds: Normal breath sounds.   Abdominal:      General: Bowel sounds are normal.      Palpations: Abdomen is soft. There is no mass.      Tenderness: There is no guarding or rebound.      Hernia: No hernia is present.      Comments: Fullness in the epigastric region with some mild tenderness.   Musculoskeletal:         General: Normal range of motion.      Cervical back: Normal range of motion and neck supple.   Lymphadenopathy:      Cervical: No cervical adenopathy.   Skin:     General: Skin is warm and dry.      Capillary Refill: Capillary refill takes less than 2 seconds.      Findings: No rash.   Neurological:      Mental Status: She is alert and oriented to person, place, and time.      Motor: No abnormal muscle tone.      Deep Tendon Reflexes: Reflexes normal.   Psychiatric:         " Behavior: Behavior normal.         Thought Content: Thought content normal.         Judgment: Judgment normal.         Recent Results (from the past 2016 hour(s))   Hemoglobin A1c    Collection Time: 03/24/21  8:56 AM    Specimen: Blood   Result Value Ref Range    Hemoglobin A1C 7.70 (H) 4.80 - 5.60 %   Comprehensive Metabolic Panel    Collection Time: 03/24/21  8:56 AM    Specimen: Blood   Result Value Ref Range    Glucose 162 (H) 65 - 99 mg/dL    BUN 16 6 - 20 mg/dL    Creatinine 0.90 0.57 - 1.00 mg/dL    eGFR Non African Am 64 >60 mL/min/1.73    eGFR African Am 78 >60 mL/min/1.73    BUN/Creatinine Ratio 17.8 7.0 - 25.0    Sodium 144 136 - 145 mmol/L    Potassium 4.2 3.5 - 5.2 mmol/L    Chloride 107 98 - 107 mmol/L    Total CO2 26.6 22.0 - 29.0 mmol/L    Calcium 9.9 8.6 - 10.5 mg/dL    Total Protein 7.3 6.0 - 8.5 g/dL    Albumin 4.70 3.50 - 5.20 g/dL    Globulin 2.6 gm/dL    A/G Ratio 1.8 g/dL    Total Bilirubin 0.5 0.0 - 1.2 mg/dL    Alkaline Phosphatase 98 39 - 117 U/L    AST (SGOT) 25 1 - 32 U/L    ALT (SGPT) 33 1 - 33 U/L   Lipid Panel    Collection Time: 03/24/21  8:56 AM    Specimen: Blood   Result Value Ref Range    Total Cholesterol 147 0 - 200 mg/dL    Triglycerides 86 0 - 150 mg/dL    HDL Cholesterol 64 (H) 40 - 60 mg/dL    VLDL Cholesterol [place] 16 5 - 40 mg/dL    LDL Chol Calc (NIH) 67 0 - 100 mg/dL   IGP, Aptima HPV, Rfx 16 / 18,45    Collection Time: 05/27/21  3:55 PM    Specimen: Cervix; ThinPrep Vial   Result Value Ref Range    Diagnosis Comment     Specimen adequacy: Comment     Clinician Provided ICD-10: Comment     Performed by: Comment     . .     Note: Comment     Method: Comment     HPV Aptima Negative Negative   POC Glycosylated Hemoglobin (Hb A1C)    Collection Time: 06/08/21  4:57 PM    Specimen: Blood   Result Value Ref Range    Hemoglobin A1C 6.8 %     Assessment/Plan   Diagnoses and all orders for this visit:    1. Type 2 diabetes mellitus with other circulatory complication, without  long-term current use of insulin (CMS/[facility]) (Primary)  -     POC Glycosylated Hemoglobin (Hb A1C)  -     metFORMIN ER (GLUCOPHAGE-XR) 500 MG 24 hr tablet; Take 2 tablets by mouth 2 (two) times a day.  Dispense: 360 tablet; Refill: 1    2. Essential hypertension    3. Abdominal bloating    Much improved diabetic control.  Continue the current medication, diet and exercise.  Follow up in 4 months.  Discussed the abdomen and bloating.  Follow up with gastroenterology.  May have an issue with diabetic gastroparesis but will likely need EGD and emptying study.           · COVID-19 Precautions - Patient was compliant in wearing a mask. When I saw the patient, I used appropriate personal protective equipment (PPE) including mask and eye shield (standard procedure).  Additionally, I used gown and gloves if indicated.  Hand hygiene was completed before and after seeing the patient.  · Dictated utilizing Dragon Dictation

## 2021-08-28 DIAGNOSIS — I10 ESSENTIAL HYPERTENSION: ICD-10-CM

## 2021-08-30 RX ORDER — METOPROLOL TARTRATE 50 MG/1
TABLET, FILM COATED ORAL
Qty: 180 TABLET | Refills: 1 | Status: SHIPPED | OUTPATIENT
Start: 2021-08-30 | End: 2022-01-04 | Stop reason: SDUPTHER

## 2021-08-30 NOTE — TELEPHONE ENCOUNTER
Rx Refill Note  Requested Prescriptions     Pending Prescriptions Disp Refills   • metoprolol tartrate (LOPRESSOR) 50 MG tablet [Pharmacy Med Name: METOPROL TAR TAB 50MG] 180 tablet 1     Sig: TAKE 1 TABLET TWICE A DAY      Last office visit with prescribing clinician: 6/8/2021      Next office visit with prescribing clinician: 9/28/2021            Tonie Rea MA  08/30/21, 10:08 EDT

## 2021-09-15 ENCOUNTER — OFFICE VISIT (OUTPATIENT)
Dept: GASTROENTEROLOGY | Facility: CLINIC | Age: 61
End: 2021-09-15

## 2021-09-15 ENCOUNTER — TELEPHONE (OUTPATIENT)
Dept: GASTROENTEROLOGY | Facility: CLINIC | Age: 61
End: 2021-09-15

## 2021-09-15 VITALS — WEIGHT: 203.8 LBS | BODY MASS INDEX: 47.16 KG/M2 | TEMPERATURE: 97.3 F | HEIGHT: 55 IN

## 2021-09-15 DIAGNOSIS — R19.4 ALTERED BOWEL HABITS: ICD-10-CM

## 2021-09-15 DIAGNOSIS — R14.0 ABDOMINAL BLOATING: ICD-10-CM

## 2021-09-15 DIAGNOSIS — R10.13 EPIGASTRIC PAIN: Primary | ICD-10-CM

## 2021-09-15 PROCEDURE — 99204 OFFICE O/P NEW MOD 45 MIN: CPT | Performed by: INTERNAL MEDICINE

## 2021-09-15 RX ORDER — SUCRALFATE 1 G/1
1 TABLET ORAL
Qty: 90 TABLET | Refills: 1 | Status: SHIPPED | OUTPATIENT
Start: 2021-09-15 | End: 2022-07-26 | Stop reason: SDUPTHER

## 2021-09-15 NOTE — PROGRESS NOTES
Subjective   Chief Complaint   Patient presents with   • Bloated       Kimberli Magallanes is a  60 y.o. female here for epigastric discomfort, bloating.    She reports epigastric pain- discomfort, sometimes nausea.  This preceded starting metformin.  Pain occurs after eating.      She takes pantorazole for heartburn which helps.  She has had a few nighttime episodes lately.  Last a1c 6.8.    She is usually regular. Metformin sometimes causes diarrhea.  She gets distended after eating.    HPI  Past Medical History:   Diagnosis Date   • Abnormal electrocardiogram    • Adverse reaction to ACE inhibitor drug    • Adverse reaction to angiotensin 2 receptor antagonist    • BMI 40.0-44.9, adult (CMS/HCC)    • Coronary artery disease 01/15/2020    Stent was placed.   • Diabetes mellitus (CMS/HCC)     Diet Controlled   • Encounter for annual health examination    • Encounter for hepatitis C screening test for low risk patient    • Esophageal reflux    • Foot deformity, acquired, right    • Glucosuria    • Hematuria, microscopic    • Hyperlipidemia    • Hypertension    • Refused influenza vaccine    • Visit for screening mammogram      Past Surgical History:   Procedure Laterality Date   • BREAST BIOPSY Left 02/14/2019    benign calcification   • CARDIAC CATHETERIZATION      x1 stent [facility]AD at [facility]   • COLONOSCOPY  approx 2016    negative per pt    • CORONARY STENT PLACEMENT  01/15/2020       Current Outpatient Medications:   •  amLODIPine (NORVASC) 10 MG tablet, Take 1 tablet by mouth Daily., Disp: 90 tablet, Rfl: 3  •  ASPIRIN LOW DOSE 81 MG chewable tablet, Chew 1 tablet Daily., Disp: 90 tablet, Rfl: 0  •  atorvastatin (LIPITOR) 40 MG tablet, Take 1 tablet by mouth Daily., Disp: 90 tablet, Rfl: 3  •  BIOTIN PO, Take  by mouth., Disp: , Rfl:   •  diclofenac (VOLTAREN) 1 % gel gel, Apply 4 g topically to the appropriate area as directed 4 (Four) Times a Day As Needed (pain)., Disp: 150 g, Rfl: 2  •  metFORMIN ER  (GLUCOPHAGE-XR) 500 MG 24 hr tablet, Take 2 tablets by mouth 2 (two) times a day., Disp: 360 tablet, Rfl: 1  •  metoprolol tartrate (LOPRESSOR) 50 MG tablet, TAKE 1 TABLET TWICE A DAY, Disp: 180 tablet, Rfl: 1  •  multivitamin with minerals (CENTRUM ADULTS PO), Take 1 tablet by mouth Daily., Disp: , Rfl:   •  nitroglycerin (NITROSTAT) 0.4 MG SL tablet, Place 1 tablet under the tongue Every 5 (Five) Minutes As Needed for Chest Pain., Disp: 30 tablet, Rfl: 0  •  pantoprazole (Protonix) 40 MG EC tablet, Take 1 tablet by mouth Daily., Disp: 90 tablet, Rfl: 3  •  Probiotic Product (PROBIOTIC-10 PO), Take  by mouth., Disp: , Rfl:   •  sucralfate (Carafate) 1 g tablet, Take 1 tablet by mouth 3 (Three) Times a Day After Meals., Disp: 90 tablet, Rfl: 1  PRN Meds:.  Allergies   Allergen Reactions   • Lisinopril Anaphylaxis and Swelling   • Losartan Anaphylaxis and Swelling   • Citrullus Vulgaris Unknown - Low Severity   • Eggs Or Egg-Derived Products Unknown - Low Severity     Social History     Socioeconomic History   • Marital status: Single     Spouse name: Not on file   • Number of children: Not on file   • Years of education: Not on file   • Highest education level: Not on file   Tobacco Use   • Smoking status: Former Smoker     Packs/day: 0.25     Years: 10.00     Pack years: 2.50     Types: Cigarettes     Start date: 1979     Quit date:      Years since quittin.7   • Smokeless tobacco: Never Used   • Tobacco comment: I quite in , then started agin, then quite but finally stopped in .   Substance and Sexual Activity   • Alcohol use: Yes     Alcohol/week: 0.0 - 2.0 standard drinks     Comment: social   • Drug use: No   • Sexual activity: Not Currently     Birth control/protection: Post-menopausal     Family History   Problem Relation Age of Onset   • Heart attack Mother 67   • Stroke Mother    • Hypertension Mother    • Heart disease Mother 67   • Diabetes Father    • Hypertension Father    • Heart  disease Father 58   • No Known Problems Other    • Heart attack Maternal Grandmother      Review of Systems   Constitutional: Negative for appetite change and unexpected weight change.   Gastrointestinal: Positive for abdominal pain and diarrhea.     Vitals:    09/15/21 1607   Temp: 97.3 °F (36.3 °C)         09/15/21  1607   Weight: 92.4 kg (203 lb 12.8 oz)       Objective   Physical Exam  Constitutional:       Appearance: Normal appearance.   Abdominal:      General: There is no distension.      Tenderness: There is abdominal tenderness.   Neurological:      Mental Status: She is alert.       No radiology results for the last 7 days    Assessment/Plan   Diagnoses and all orders for this visit:    Epigastric pain  -     Case Request; Standing  -     Case Request    Abdominal bloating    Altered bowel habits    Other orders  -     sucralfate (Carafate) 1 g tablet; Take 1 tablet by mouth 3 (Three) Times a Day After Meals.  -     Follow Anesthesia Guidelines / Protocol; Future  -     Obtain Informed Consent; Future  -     Implement Anesthesia orders day of procedure.; Standing  -     Obtain informed consent; Standing      Plan:  · Continue pantoprazole daily  · Start benefiber  · Add carafate  · Plan for egd for further eval- may need to consider sibo testing, ges depending on results

## 2021-09-15 NOTE — TELEPHONE ENCOUNTER
Spoke with patient in office for EGD. [facility] with arrival at 9:00am. Spoke with Nael

## 2021-09-21 ENCOUNTER — LAB REQUISITION (OUTPATIENT)
Dept: LAB | Facility: HOSPITAL | Age: 61
End: 2021-09-21

## 2021-09-21 DIAGNOSIS — Z00.00 ENCOUNTER FOR GENERAL ADULT MEDICAL EXAMINATION WITHOUT ABNORMAL FINDINGS: ICD-10-CM

## 2021-09-21 PROCEDURE — U0004 COV-19 TEST NON-CDC HGH THRU: HCPCS | Performed by: INTERNAL MEDICINE

## 2021-09-22 LAB — SARS-COV-2 ORF1AB RESP QL NAA+PROBE: NOT DETECTED

## 2021-09-24 ENCOUNTER — OUTSIDE FACILITY SERVICE (OUTPATIENT)
Dept: GASTROENTEROLOGY | Facility: CLINIC | Age: 61
End: 2021-09-24

## 2021-09-24 PROCEDURE — 43239 EGD BIOPSY SINGLE/MULTIPLE: CPT | Performed by: INTERNAL MEDICINE

## 2021-09-28 ENCOUNTER — OFFICE VISIT (OUTPATIENT)
Dept: FAMILY MEDICINE CLINIC | Facility: CLINIC | Age: 61
End: 2021-09-28

## 2021-09-28 VITALS
BODY MASS INDEX: 46.89 KG/M2 | HEIGHT: 55 IN | WEIGHT: 202.6 LBS | HEART RATE: 78 BPM | DIASTOLIC BLOOD PRESSURE: 82 MMHG | OXYGEN SATURATION: 99 % | SYSTOLIC BLOOD PRESSURE: 138 MMHG | TEMPERATURE: 98.6 F

## 2021-09-28 DIAGNOSIS — I10 ESSENTIAL HYPERTENSION: Primary | ICD-10-CM

## 2021-09-28 DIAGNOSIS — E11.59 TYPE 2 DIABETES MELLITUS WITH OTHER CIRCULATORY COMPLICATION, WITHOUT LONG-TERM CURRENT USE OF INSULIN (HCC): ICD-10-CM

## 2021-09-28 DIAGNOSIS — E78.5 HYPERLIPIDEMIA, UNSPECIFIED HYPERLIPIDEMIA TYPE: ICD-10-CM

## 2021-09-28 PROCEDURE — 99214 OFFICE O/P EST MOD 30 MIN: CPT | Performed by: INTERNAL MEDICINE

## 2021-09-28 NOTE — PROGRESS NOTES
Subjective   Kimberli Magallanes is a 60 y.o. female.     Chief Complaint   Patient presents with   • Diabetes       History of Present Illness   Here for follow-up of diabetes.  Patient states to have been compliant with medications and trying to exercise more.  has been having elevated blood sugars for the last 2 weeks with no change in medication other than MVI, biotin, probiotic.  Blood sugar monitoring - patient states has been running on average 120.  With a range of .  No episodes of hypoglycemia, nausea, vomiting, new rashes, syncope or other issues.  Was started on metformin XR 1000 mg once a day 1/20/2021.  Last A1c 6/8/21 6.8%.    Follow-up for hypertension.  Currently, has been feeling well and asymptomatic without any headaches,vision changes, cough, chest pain, shortness of breath, swelling, focal neurologic deficit, memory loss or syncope.  Has been taking the medications regularly and adherent with the regimen of amlodipine 10 mg, metoprolol 50 mg BID. Denies medication side effects and no significant interval events.       Follow-up for cholesterol.  Currently, has been feeling well without any myalgias, muscle aches, weakness, numbness, chest pain, short of breath or other issues.  Currently, is adherent with medication regimen of atorvastatin 40 mg and denies medication side effects. Is due for lab follow-up. Last LDL 3/24/21.     Continues with bloating and gassiness.  Seeing Dr Martínez and had EGD on 9/24/21 with moderate Hiatal Hernia and erythema in the stomach.  Biopsies pending.  Was given sucralfate TID but makes patient sick.    The following portions of the patient's history were reviewed and updated as appropriate: allergies, current medications, past family history, past medical history, past social history, past surgical history and problem list.    Depression Screen:  No flowsheet data found.    Past Medical History:   Diagnosis Date   • Abnormal electrocardiogram    • Adverse reaction  to ACE inhibitor drug    • Adverse reaction to angiotensin 2 receptor antagonist    • BMI 40.0-44.9, adult (CMS/[facility])    • Coronary artery disease 01/15/2020    Stent was placed.   • Diabetes mellitus (CMS/[facility])     Diet Controlled   • Encounter for annual health examination    • Encounter for hepatitis C screening test for low risk patient    • Esophageal reflux    • Foot deformity, acquired, right    • Glucosuria    • Hematuria, microscopic    • Hyperlipidemia    • Hypertension    • Refused influenza vaccine    • Visit for screening mammogram        Past Surgical History:   Procedure Laterality Date   • BREAST BIOPSY Left 2019    benign calcification   • CARDIAC CATHETERIZATION      x1 stent LAD at [facility]   • COLONOSCOPY  approx 2016    negative per pt    • CORONARY STENT PLACEMENT  01/15/2020       Family History   Problem Relation Age of Onset   • Heart attack Mother 67   • Stroke Mother    • Hypertension Mother    • Heart disease Mother 67   • Diabetes Father    • Hypertension Father    • Heart disease Father 58   • No Known Problems Other    • Heart attack Maternal Grandmother        Social History     Socioeconomic History   • Marital status: Single     Spouse name: Not on file   • Number of children: Not on file   • Years of education: Not on file   • Highest education level: Not on file   Tobacco Use   • Smoking status: Former Smoker     Packs/day: 0.25     Years: 10.00     Pack years: 2.50     Types: Cigarettes     Start date: 1979     Quit date:      Years since quittin.7   • Smokeless tobacco: Never Used   • Tobacco comment: I quite in , then started agin, then quite but finally stopped in .   Substance and Sexual Activity   • Alcohol use: Yes     Alcohol/week: 0.0 - 2.0 standard drinks     Comment: social   • Drug use: No   • Sexual activity: Not Currently     Birth control/protection: Post-menopausal       Current Outpatient Medications   Medication Sig Dispense Refill    • amLODIPine (NORVASC) 10 MG tablet Take 1 tablet by mouth Daily. 90 tablet 3   • ASPIRIN LOW DOSE 81 MG chewable tablet Chew 1 tablet Daily. 90 tablet 0   • atorvastatin (LIPITOR) 40 MG tablet Take 1 tablet by mouth Daily. 90 tablet 3   • BIOTIN PO Take  by mouth.     • diclofenac (VOLTAREN) 1 % gel gel Apply 4 g topically to the appropriate area as directed 4 (Four) Times a Day As Needed (pain). 150 g 2   • metFORMIN ER (GLUCOPHAGE-XR) 500 MG 24 hr tablet Take 2 tablets by mouth 2 (two) times a day. 360 tablet 1   • metoprolol tartrate (LOPRESSOR) 50 MG tablet TAKE 1 TABLET TWICE A  tablet 1   • multivitamin with minerals (CENTRUM ADULTS PO) Take 1 tablet by mouth Daily.     • nitroglycerin (NITROSTAT) 0.4 MG SL tablet Place 1 tablet under the tongue Every 5 (Five) Minutes As Needed for Chest Pain. 30 tablet 0   • pantoprazole (Protonix) 40 MG EC tablet Take 1 tablet by mouth Daily. 90 tablet 3   • Probiotic Product (PROBIOTIC-10 PO) Take  by mouth.     • sucralfate (Carafate) 1 g tablet Take 1 tablet by mouth 3 (Three) Times a Day After Meals. 90 tablet 1     No current facility-administered medications for this visit.       Review of Systems   Constitutional: Negative for activity change, appetite change, fatigue, fever, unexpected weight gain and unexpected weight loss.   HENT: Negative for nosebleeds, rhinorrhea, trouble swallowing and voice change.    Eyes: Negative for visual disturbance.   Respiratory: Negative for cough, chest tightness, shortness of breath and wheezing.    Cardiovascular: Negative for chest pain, palpitations and leg swelling.   Gastrointestinal: Negative for abdominal pain, blood in stool, constipation, diarrhea, nausea, vomiting, GERD and indigestion.   Genitourinary: Negative for dysuria, frequency and hematuria.   Musculoskeletal: Negative for arthralgias, back pain and myalgias.   Skin: Negative for rash and bruise.   Neurological: Negative for dizziness, tremors, weakness,  "light-headedness, numbness, headache and memory problem.   Hematological: Negative for adenopathy. Does not bruise/bleed easily.   Psychiatric/Behavioral: Negative for sleep disturbance and depressed mood. The patient is not nervous/anxious.        Objective   /82 (BP Location: Right arm, Patient Position: Sitting)   Pulse 78   Temp 98.6 °F (37 °C)   Ht 124.5 cm (49.02\")   Wt 91.9 kg (202 lb 9.6 oz)   SpO2 99%   BMI 59.29 kg/m²     Physical Exam  Vitals and nursing note reviewed.   Constitutional:       General: She is not in acute distress.     Appearance: She is well-developed. She is not diaphoretic.   HENT:      Head: Normocephalic and atraumatic.      Right Ear: External ear normal.      Left Ear: External ear normal.      Nose: Nose normal.   Eyes:      Conjunctiva/sclera: Conjunctivae normal.      Pupils: Pupils are equal, round, and reactive to light.   Neck:      Thyroid: No thyromegaly.      Trachea: No tracheal deviation.   Cardiovascular:      Rate and Rhythm: Normal rate and regular rhythm.      Heart sounds: Normal heart sounds. No murmur heard.   No friction rub. No gallop.    Pulmonary:      Effort: Pulmonary effort is normal. No respiratory distress.      Breath sounds: Normal breath sounds.   Abdominal:      General: Bowel sounds are normal.      Palpations: Abdomen is soft. There is no mass.      Tenderness: There is no abdominal tenderness. There is no guarding.   Musculoskeletal:         General: Normal range of motion.      Cervical back: Normal range of motion and neck supple.      Comments: Right foot first toe bunion.   Lymphadenopathy:      Cervical: No cervical adenopathy.   Skin:     General: Skin is warm and dry.      Capillary Refill: Capillary refill takes less than 2 seconds.      Findings: No rash.   Neurological:      Mental Status: She is alert and oriented to person, place, and time.      Motor: No abnormal muscle tone.      Deep Tendon Reflexes: Reflexes normal. "   Psychiatric:         Behavior: Behavior normal.         Thought Content: Thought content normal.         Judgment: Judgment normal.         Recent Results (from the past 2016 hour(s))   COVID-19,APTIMA PANTHER(GABRIELA),[facility], NP/OP SWAB IN UTM/VTM/[facility] [employer] [facility],24 HR TAT - Swab, Nasopharynx    Collection Time: 09/21/21 12:40 PM    Specimen: Nasopharynx; Swab   Result Value Ref Range    COVID19 Not Detected Not Detected - Ref. Range     Assessment/Plan   Diagnoses and all orders for this visit:    1. Essential hypertension (Primary)  -     Comprehensive metabolic panel  -     Hemoglobin A1c  -     Microalbumin / Creatinine Urine Ratio - Urine, Clean Catch  -     Lipid panel    2. Hyperlipidemia, unspecified hyperlipidemia type  -     Comprehensive metabolic panel  -     Hemoglobin A1c  -     Microalbumin / Creatinine Urine Ratio - Urine, Clean Catch  -     Lipid panel    3. Type 2 diabetes mellitus with other circulatory complication, without long-term current use of insulin (CMS/[facility])  -     Comprehensive metabolic panel  -     Hemoglobin A1c  -     Microalbumin / Creatinine Urine Ratio - Urine, Clean Catch  -     Lipid panel    Hypertension is currently controlled though borderline.  I discussed with patient importance of continue to try and lower this blood pressure which would likely be improved with the use of exercise and dieting with weight loss.  Patient is understanding and agreeable.  History of hyperlipidemia stable we will continue the atorvastatin while checking the CMP and lipid panel.  Will adjust medication based upon these test results.  Type 2 diabetes controlled.  Continue the current medication regimen unchanged and await the A1c.  We will adjust based upon these results as well but once again we did recognize and encourage diet exercise and weight loss.           · COVID-19 Precautions - Patient was compliant in wearing a mask. When I saw the patient, I used appropriate personal protective  equipment (PPE) including mask and eye shield (standard procedure).  Additionally, I used gown and gloves if indicated.  Hand hygiene was completed before and after seeing the patient.  · Dictated utilizing Dragon Dictation

## 2021-09-29 LAB
ALBUMIN SERPL-MCNC: 4.9 G/DL (ref 3.8–4.9)
ALBUMIN/CREAT UR: <9 MG/G CREAT (ref 0–29)
ALBUMIN/GLOB SERPL: 1.9 {RATIO} (ref 1.2–2.2)
ALP SERPL-CCNC: 117 IU/L (ref 44–121)
ALT SERPL-CCNC: 25 IU/L (ref 0–32)
AST SERPL-CCNC: 25 IU/L (ref 0–40)
BILIRUB SERPL-MCNC: 0.5 MG/DL (ref 0–1.2)
BUN SERPL-MCNC: 12 MG/DL (ref 6–24)
BUN/CREAT SERPL: 15 (ref 9–23)
CALCIUM SERPL-MCNC: 9.3 MG/DL (ref 8.7–10.2)
CHLORIDE SERPL-SCNC: 103 MMOL/L (ref 96–106)
CHOLEST SERPL-MCNC: 162 MG/DL (ref 100–199)
CO2 SERPL-SCNC: 23 MMOL/L (ref 20–29)
CREAT SERPL-MCNC: 0.82 MG/DL (ref 0.57–1)
CREAT UR-MCNC: 31.8 MG/DL
GLOBULIN SER CALC-MCNC: 2.6 G/DL (ref 1.5–4.5)
GLUCOSE SERPL-MCNC: 86 MG/DL (ref 65–99)
HBA1C MFR BLD: 6.5 % (ref 4.8–5.6)
HDLC SERPL-MCNC: 72 MG/DL
LDLC SERPL CALC-MCNC: 74 MG/DL (ref 0–99)
MICROALBUMIN UR-MCNC: <3 UG/ML
POTASSIUM SERPL-SCNC: 3.7 MMOL/L (ref 3.5–5.2)
PROT SERPL-MCNC: 7.5 G/DL (ref 6–8.5)
SODIUM SERPL-SCNC: 144 MMOL/L (ref 134–144)
TRIGL SERPL-MCNC: 84 MG/DL (ref 0–149)
VLDLC SERPL CALC-MCNC: 16 MG/DL (ref 5–40)

## 2021-10-11 DIAGNOSIS — R11.0 NAUSEA: ICD-10-CM

## 2021-10-11 DIAGNOSIS — R10.13 EPIGASTRIC PAIN: Primary | ICD-10-CM

## 2021-10-12 ENCOUNTER — TELEPHONE (OUTPATIENT)
Dept: GASTROENTEROLOGY | Facility: CLINIC | Age: 61
End: 2021-10-12

## 2021-10-12 NOTE — TELEPHONE ENCOUNTER
Call to pt.  Advise per DR Martínez note.  Verb understanding.     Advise [facility] will contact to arrange GES.  If booked too far out, may wish to complete at other facility.  Verb understanding

## 2021-10-12 NOTE — TELEPHONE ENCOUNTER
----- Message from Amanda Martínez MD sent at 10/11/2021  4:36 PM EDT -----  All biopsies from her recent EGD were normal-no significant inflammation.  Recommend continue current medications and plan for gastric emptying study for further evaluation of her symptoms

## 2021-10-31 DIAGNOSIS — E11.59 TYPE 2 DIABETES MELLITUS WITH OTHER CIRCULATORY COMPLICATION, WITHOUT LONG-TERM CURRENT USE OF INSULIN (HCC): ICD-10-CM

## 2021-11-01 RX ORDER — METFORMIN HYDROCHLORIDE 500 MG/1
TABLET, EXTENDED RELEASE ORAL
Qty: 360 TABLET | Refills: 1 | Status: SHIPPED | OUTPATIENT
Start: 2021-11-01 | End: 2022-01-04 | Stop reason: SDUPTHER

## 2021-11-01 NOTE — TELEPHONE ENCOUNTER
Rx Refill Note  Requested Prescriptions     Pending Prescriptions Disp Refills   • metFORMIN ER (GLUCOPHAGE-XR) 500 MG 24 hr tablet [Pharmacy Med Name: METFORMIN ER TAB 500MG GP] 360 tablet 1     Sig: TAKE 2 TABLETS TWICE A DAY      Last office visit with prescribing clinician: 9/28/2021      Next office visit with prescribing clinician: Visit date not found            Tonie Rea MA  11/01/21, 16:17 EDT

## 2021-11-18 ENCOUNTER — OFFICE VISIT (OUTPATIENT)
Dept: CARDIOLOGY | Facility: CLINIC | Age: 61
End: 2021-11-18

## 2021-11-18 VITALS
WEIGHT: 209 LBS | BODY MASS INDEX: 48.37 KG/M2 | HEART RATE: 72 BPM | OXYGEN SATURATION: 98 % | DIASTOLIC BLOOD PRESSURE: 98 MMHG | HEIGHT: 55 IN | SYSTOLIC BLOOD PRESSURE: 132 MMHG

## 2021-11-18 DIAGNOSIS — E78.5 HYPERLIPIDEMIA, UNSPECIFIED HYPERLIPIDEMIA TYPE: ICD-10-CM

## 2021-11-18 DIAGNOSIS — I25.119 CORONARY ARTERY DISEASE INVOLVING NATIVE CORONARY ARTERY OF NATIVE HEART WITH ANGINA PECTORIS (HCC): Primary | ICD-10-CM

## 2021-11-18 DIAGNOSIS — E11.59 TYPE 2 DIABETES MELLITUS WITH OTHER CIRCULATORY COMPLICATION, WITHOUT LONG-TERM CURRENT USE OF INSULIN (HCC): ICD-10-CM

## 2021-11-18 DIAGNOSIS — Z98.61 S/P PTCA (PERCUTANEOUS TRANSLUMINAL CORONARY ANGIOPLASTY): ICD-10-CM

## 2021-11-18 DIAGNOSIS — I10 PRIMARY HYPERTENSION: ICD-10-CM

## 2021-11-18 PROCEDURE — 93000 ELECTROCARDIOGRAM COMPLETE: CPT | Performed by: NURSE PRACTITIONER

## 2021-11-18 PROCEDURE — 99214 OFFICE O/P EST MOD 30 MIN: CPT | Performed by: NURSE PRACTITIONER

## 2021-11-18 NOTE — PROGRESS NOTES
Date of Office Visit: 2021  Encounter Provider: INOCENTE Salamanca  Place of Service: [facility] CARDIOLOGY  Patient Name: Kimberli Magallanes  :1960    Chief Complaint   Patient presents with   • Hypertension     follow-up   :     HPI: Kimberli Magallanes is a 60 y.o. female who is a patient of Dr. Morales and is new to me today.  She has a history of coronary disease, non-ST elevation MI, hypertension, diabetes mellitus, hyper lipidemia and angioedema from ACE inhibitor.  In 2020 she was in the hospital at [facility] with chest pain and a non-STEMI.  Her peak troponin was 0.7.  She had a long first diagonal versus ramus with 80% stenosis and was stented with a drug-eluting stent.  There is mild disease in the LAD about 30% and the RCA was nondominant.  Her EF on LV gram was normal but did not have an echo.    She was last followed up with us in the office in May and described a burning pressure sensation in her epigastric region.  Her EKG was stable and it did not worsen with activity.  We believe this was due to epigastric pain and she was to continue Protonix.  Dr. Morales did not believe it was angina related.  She went to see GI and had an EGD which was normal.  She was put on Carafate.  She plans to have a gastric emptying study after the first of the year.    She denies any angina symptoms.  Her chest pain is improved.  She is taking Carafate in addition to Protonix.  Recently she lost her aunt and has been at a lot of family functions and they have had some pizza and salty foods so she has had some swelling in her legs today.  But she is not short of breath.  She came use exercise equipment for up to 45 minutes at a time without any issue.  Her HDL is 74 her LDL is 79.  Previous testing and notes have been reviewed by me.   Past Medical History:   Diagnosis Date   • Abnormal electrocardiogram    • Adverse reaction to ACE inhibitor drug    • Adverse reaction to angiotensin  2 receptor antagonist    • BMI 40.0-44.9, adult (HCC)    • Coronary artery disease 01/15/2020    Stent was placed.   • Diabetes mellitus (HCC)     Diet Controlled   • Encounter for annual health examination    • Encounter for hepatitis C screening test for low risk patient    • Esophageal reflux    • Foot deformity, acquired, right    • Glucosuria    • Hematuria, microscopic    • Hyperlipidemia    • Hypertension    • Refused influenza vaccine    • Visit for screening mammogram        Past Surgical History:   Procedure Laterality Date   • BREAST BIOPSY Left 2019    benign calcification   • CARDIAC CATHETERIZATION      x1 stent LAD at [facility]   • COLONOSCOPY  approx 2016    negative per pt    • CORONARY STENT PLACEMENT  01/15/2020       Social History     Socioeconomic History   • Marital status: Single   Tobacco Use   • Smoking status: Former Smoker     Packs/day: 0.25     Years: 10.00     Pack years: 2.50     Types: Cigarettes     Start date: 1979     Quit date:      Years since quittin.8   • Smokeless tobacco: Never Used   • Tobacco comment: I quite in , then started agin, then quite but finally stopped in .   Substance and Sexual Activity   • Alcohol use: Yes     Alcohol/week: 0.0 - 2.0 standard drinks     Comment: social   • Drug use: No   • Sexual activity: Not Currently     Birth control/protection: Post-menopausal       Family History   Problem Relation Age of Onset   • Heart attack Mother 67   • Stroke Mother    • Hypertension Mother    • Heart disease Mother 67   • Diabetes Father    • Hypertension Father    • Heart disease Father 58   • No Known Problems Other    • Heart attack Maternal Grandmother        ROS    Allergies   Allergen Reactions   • Lisinopril Anaphylaxis and Swelling   • Losartan Anaphylaxis and Swelling   • Citrullus Vulgaris Unknown - Low Severity   • Eggs Or Egg-Derived Products Unknown - Low Severity         Current Outpatient Medications:   •  amLODIPine  "(NORVASC) 10 MG tablet, Take 1 tablet by mouth Daily., Disp: 90 tablet, Rfl: 3  •  ASPIRIN LOW DOSE 81 MG chewable tablet, Chew 1 tablet Daily., Disp: 90 tablet, Rfl: 0  •  atorvastatin (LIPITOR) 40 MG tablet, Take 1 tablet by mouth Daily., Disp: 90 tablet, Rfl: 3  •  BIOTIN PO, Take  by mouth., Disp: , Rfl:   •  diclofenac (VOLTAREN) 1 % gel gel, Apply 4 g topically to the appropriate area as directed 4 (Four) Times a Day As Needed (pain)., Disp: 150 g, Rfl: 2  •  metFORMIN ER (GLUCOPHAGE-XR) 500 MG 24 hr tablet, TAKE 2 TABLETS TWICE A DAY, Disp: 360 tablet, Rfl: 1  •  metoprolol tartrate (LOPRESSOR) 50 MG tablet, TAKE 1 TABLET TWICE A DAY, Disp: 180 tablet, Rfl: 1  •  multivitamin with minerals (CENTRUM ADULTS PO), Take 1 tablet by mouth Daily., Disp: , Rfl:   •  nitroglycerin (NITROSTAT) 0.4 MG SL tablet, Place 1 tablet under the tongue Every 5 (Five) Minutes As Needed for Chest Pain., Disp: 30 tablet, Rfl: 0  •  pantoprazole (Protonix) 40 MG EC tablet, Take 1 tablet by mouth Daily., Disp: 90 tablet, Rfl: 3  •  Probiotic Product (PROBIOTIC-10 PO), Take  by mouth., Disp: , Rfl:   •  sucralfate (Carafate) 1 g tablet, Take 1 tablet by mouth 3 (Three) Times a Day After Meals., Disp: 90 tablet, Rfl: 1      Objective:     Vitals:    11/18/21 1422   BP: 132/98   Pulse: 72   SpO2: 98%   Weight: 94.8 kg (209 lb)   Height: 124.5 cm (49.02\")     Body mass index is 61.15 kg/m².    PHYSICAL EXAM:    Constitutional:       General: Not in acute distress.     Appearance: Normal appearance. Well-developed.   Eyes:      Pupils: Pupils are equal, round, and reactive to light.   HENT:      Head: Normocephalic.   Neck:      Vascular: No carotid bruit or JVD.   Pulmonary:      Effort: Pulmonary effort is normal. No tachypnea.      Breath sounds: Normal breath sounds. No wheezing. No rales.   Cardiovascular:      Normal rate. Regular rhythm.      No gallop.   Pulses:     Intact distal pulses.   Edema:     Pretibial: bilateral 1+ edema " of the pretibial area.     Ankle: bilateral 1+ edema of the ankle.  Abdominal:      General: Bowel sounds are normal.      Palpations: Abdomen is soft.      Tenderness: There is no abdominal tenderness.   Musculoskeletal: Normal range of motion.      Cervical back: Normal range of motion and neck supple. No edema. Skin:     General: Skin is warm and dry.   Neurological:      Mental Status: Alert and oriented to person, place, and time.           ECG 12 Lead    Date/Time: 11/18/2021 2:27 PM  Performed by: Taryn Scott APRN  Authorized by: Taryn Scott APRN   Comparison: compared with previous ECG from 5/13/2021  Similar to previous ECG  Rhythm: sinus rhythm  Rate: normal  QRS axis: indeterminate    Clinical impression: non-specific ECG              Assessment:       Diagnosis Plan   1. Coronary artery disease involving native coronary artery of native heart with angina pectoris (HCC)     2. Hyperlipidemia, unspecified hyperlipidemia type     3. Primary hypertension     4. S/P PTCA (percutaneous transluminal coronary angioplasty)     5. Type 2 diabetes mellitus with other circulatory complication, without long-term current use of insulin (HCC)       Orders Placed This Encounter   Procedures   • ECG 12 Lead     This order was created via procedure documentation     Order Specific Question:   Release to patient     Answer:   Immediate          Plan:       I encouraged her to maintain a healthy diet and to keep up her physical activity.  I do not think we need to make any changes to her medicines.  She did have a hiatal hernia on her EGD and the Carafate seems to be making an improvement.  I discussed with her to watch her sodium intake.  She can follow-up in 6 months         Your medication list          Accurate as of November 18, 2021  2:46 PM. If you have any questions, ask your nurse or doctor.            CONTINUE taking these medications      Instructions Last Dose Given Next Dose Due   amLODIPine 10 MG  tablet  Commonly known as: NORVASC      Take 1 tablet by mouth Daily.       Aspirin Low Dose 81 MG chewable tablet  Generic drug: aspirin      Chew 1 tablet Daily.       atorvastatin 40 MG tablet  Commonly known as: LIPITOR      Take 1 tablet by mouth Daily.       BIOTIN PO      Take  by mouth.       diclofenac 1 % gel gel  Commonly known as: VOLTAREN      Apply 4 g topically to the appropriate area as directed 4 (Four) Times a Day As Needed (pain).       metFORMIN  MG 24 hr tablet  Commonly known as: GLUCOPHAGE-XR      TAKE 2 TABLETS TWICE A DAY       metoprolol tartrate 50 MG tablet  Commonly known as: LOPRESSOR      TAKE 1 TABLET TWICE A DAY       multivitamin with minerals tablet tablet      Take 1 tablet by mouth Daily.       nitroglycerin 0.4 MG SL tablet  Commonly known as: NITROSTAT      Place 1 tablet under the tongue Every 5 (Five) Minutes As Needed for Chest Pain.       pantoprazole 40 MG EC tablet  Commonly known as: Protonix      Take 1 tablet by mouth Daily.       PROBIOTIC-10 PO      Take  by mouth.       sucralfate 1 g tablet  Commonly known as: Carafate      Take 1 tablet by mouth 3 (Three) Times a Day After Meals.                As always, it has been a pleasure to participate in your patient's care.      Sincerely,     Taryn BROWER

## 2022-01-04 DIAGNOSIS — E78.5 HYPERLIPIDEMIA, UNSPECIFIED HYPERLIPIDEMIA TYPE: ICD-10-CM

## 2022-01-04 DIAGNOSIS — E11.59 TYPE 2 DIABETES MELLITUS WITH OTHER CIRCULATORY COMPLICATION, WITHOUT LONG-TERM CURRENT USE OF INSULIN: ICD-10-CM

## 2022-01-04 DIAGNOSIS — I10 ESSENTIAL HYPERTENSION: ICD-10-CM

## 2022-01-04 DIAGNOSIS — K21.9 GASTROESOPHAGEAL REFLUX DISEASE WITHOUT ESOPHAGITIS: ICD-10-CM

## 2022-01-05 RX ORDER — PANTOPRAZOLE SODIUM 40 MG/1
40 TABLET, DELAYED RELEASE ORAL DAILY
Qty: 90 TABLET | Refills: 3 | Status: SHIPPED | OUTPATIENT
Start: 2022-01-05 | End: 2022-08-05 | Stop reason: SDUPTHER

## 2022-01-05 RX ORDER — ATORVASTATIN CALCIUM 40 MG/1
40 TABLET, FILM COATED ORAL DAILY
Qty: 90 TABLET | Refills: 3 | Status: SHIPPED | OUTPATIENT
Start: 2022-01-05 | End: 2022-07-26 | Stop reason: SDUPTHER

## 2022-01-05 RX ORDER — METFORMIN HYDROCHLORIDE 500 MG/1
1000 TABLET, EXTENDED RELEASE ORAL 2 TIMES DAILY
Qty: 360 TABLET | Refills: 1 | Status: SHIPPED | OUTPATIENT
Start: 2022-01-05 | End: 2022-07-26 | Stop reason: SDUPTHER

## 2022-01-05 RX ORDER — AMLODIPINE BESYLATE 10 MG/1
10 TABLET ORAL DAILY
Qty: 90 TABLET | Refills: 3 | Status: SHIPPED | OUTPATIENT
Start: 2022-01-05 | End: 2022-07-12 | Stop reason: SDUPTHER

## 2022-01-05 RX ORDER — METOPROLOL TARTRATE 50 MG/1
50 TABLET, FILM COATED ORAL 2 TIMES DAILY
Qty: 180 TABLET | Refills: 1 | Status: SHIPPED | OUTPATIENT
Start: 2022-01-05 | End: 2022-07-25 | Stop reason: SDUPTHER

## 2022-01-05 NOTE — TELEPHONE ENCOUNTER
Rx Refill Note  Requested Prescriptions     Pending Prescriptions Disp Refills   • atorvastatin (LIPITOR) 40 MG tablet 90 tablet 3     Sig: Take 1 tablet by mouth Daily.   • amLODIPine (NORVASC) 10 MG tablet 90 tablet 3     Sig: Take 1 tablet by mouth Daily.   • pantoprazole (Protonix) 40 MG EC tablet 90 tablet 3     Sig: Take 1 tablet by mouth Daily.   • metoprolol tartrate (LOPRESSOR) 50 MG tablet 180 tablet 1     Sig: Take 1 tablet by mouth 2 (Two) Times a Day.   • metFORMIN ER (GLUCOPHAGE-XR) 500 MG 24 hr tablet 360 tablet 1     Sig: Take 2 tablets by mouth 2 (Two) Times a Day.      Last office visit with prescribing clinician: 9/28/2021      Next office visit with prescribing clinician:    Lenka Tee MA  01/05/22, 08:29 EST

## 2022-01-31 ENCOUNTER — APPOINTMENT (OUTPATIENT)
Dept: NUCLEAR MEDICINE | Facility: HOSPITAL | Age: 62
End: 2022-01-31

## 2022-03-30 ENCOUNTER — APPOINTMENT (OUTPATIENT)
Dept: NUCLEAR MEDICINE | Facility: HOSPITAL | Age: 62
End: 2022-03-30

## 2022-04-20 ENCOUNTER — OFFICE VISIT (OUTPATIENT)
Dept: FAMILY MEDICINE CLINIC | Facility: CLINIC | Age: 62
End: 2022-04-20

## 2022-04-20 VITALS
TEMPERATURE: 98 F | DIASTOLIC BLOOD PRESSURE: 76 MMHG | BODY MASS INDEX: 46.29 KG/M2 | SYSTOLIC BLOOD PRESSURE: 126 MMHG | WEIGHT: 200 LBS | OXYGEN SATURATION: 98 % | HEART RATE: 74 BPM | HEIGHT: 55 IN

## 2022-04-20 DIAGNOSIS — M65.331 TRIGGER MIDDLE FINGER OF RIGHT HAND: ICD-10-CM

## 2022-04-20 DIAGNOSIS — Z00.00 ENCOUNTER FOR ANNUAL HEALTH EXAMINATION: Primary | ICD-10-CM

## 2022-04-20 DIAGNOSIS — R07.89 ATYPICAL CHEST PAIN: ICD-10-CM

## 2022-04-20 DIAGNOSIS — E11.59 TYPE 2 DIABETES MELLITUS WITH OTHER CIRCULATORY COMPLICATION, WITHOUT LONG-TERM CURRENT USE OF INSULIN: ICD-10-CM

## 2022-04-20 DIAGNOSIS — I10 PRIMARY HYPERTENSION: ICD-10-CM

## 2022-04-20 DIAGNOSIS — E78.5 HYPERLIPIDEMIA, UNSPECIFIED HYPERLIPIDEMIA TYPE: ICD-10-CM

## 2022-04-20 PROCEDURE — 99396 PREV VISIT EST AGE 40-64: CPT | Performed by: INTERNAL MEDICINE

## 2022-04-20 RX ORDER — IBUPROFEN 800 MG/1
800 TABLET ORAL EVERY 6 HOURS PRN
COMMUNITY
Start: 2022-04-12 | End: 2022-04-20

## 2022-04-20 RX ORDER — NITROGLYCERIN 0.4 MG/1
0.4 TABLET SUBLINGUAL
Qty: 30 TABLET | Refills: 0 | Status: SHIPPED | OUTPATIENT
Start: 2022-04-20 | End: 2022-08-05 | Stop reason: SDUPTHER

## 2022-04-20 NOTE — PROGRESS NOTES
Chief Complaint   Patient presents with   • Annual Exam   • Diabetes       HPI:  Kimberli Magallanes, -1960, is a 61 y.o. female who presents for an annual physical.    Here for follow-up of diabetes.  Patient states to have been compliant with medications and trying to exercise more.  has been having elevated blood sugars for the last 2 weeks with no change in medication other than MVI, biotin, probiotic.  Blood sugar monitoring - patient states has been running on average 115.  With a range of .  No episodes of hypoglycemia, nausea, vomiting, new rashes, syncope or other issues.  Was started on metformin XR 1000 mg once a day 2021.  Last A1c 21 6.5%.     Follow-up for hypertension.  Currently, has been feeling well and asymptomatic without any headaches,vision changes, cough, chest pain, shortness of breath, swelling, focal neurologic deficit, memory loss or syncope.  Has been taking the medications regularly and adherent with the regimen of amlodipine 10 mg, metoprolol 50 mg BID. Denies medication side effects and no significant interval events.       Follow-up for cholesterol.  Currently, has been feeling well without any myalgias, muscle aches, weakness, numbness, chest pain, short of breath or other issues.  Currently, is adherent with medication regimen of atorvastatin 40 mg and denies medication side effects. Is due for lab follow-up. Last LDL 21.    Patient with right middle finger that has been stiff and sticking.  Has had pain.  Present for the last 4 months and progressing.    Recent Hospitalizations:  No hospitalization(s) within the last year..    Current Medical Providers:  Patient Care Team:  Brandin Khalil MD as PCP - General  Brandin Khalil MD as PCP - Family Medicine  Matthew Montes MD as Consulting Physician (Interventional Cardiology)  Lex Morales MD as Consulting Physician (Cardiology)  Pamela Bland PA as Physician Assistant (Obstetrics and  Gynecology)    Compared to one year ago, the patient feels her physical health is the same and her mental health is the same.    Depression Screen:  PHQ-2/PHQ-9 Depression Screening 4/20/2022   Little Interest or Pleasure in Doing Things 1-->several days   Feeling Down, Depressed or Hopeless 0-->not at all   Trouble Falling or Staying Asleep, or Sleeping Too Much 0-->not at all   Feeling Tired or Having Little Energy 0-->not at all   Poor Appetite or Overeating 0-->not at all   Feeling Bad about Yourself - or that You are a Failure or Have Let Yourself or Your Family Down 0-->not at all   Trouble Concentrating on Things, Such as Reading the Newspaper or Watching Television 0-->not at all   Moving or Speaking So Slowly that Other People Could Have Noticed? Or the Opposite - Being So Fidgety 0-->not at all   Thoughts that You Would be Better Off Dead or of Hurting Yourself in Some Way 0-->not at all   PHQ-9: Brief Depression Severity Measure Score 1   If You Checked Off Any Problems, How Difficult Have These Problems Made It For You to Do Your Work, Take Care of Things at Home, or Get Along with Other People? not difficult at all   Patient has gotten in argument with daughter for exercise and now does not want to.    Past Medical/Family/Social History:  The following portions of the patient's history were reviewed and updated as appropriate: allergies, current medications, past family history, past medical history, past social history, past surgical history and problem list.    Allergies   Allergen Reactions   • Lisinopril Anaphylaxis and Swelling   • Losartan Anaphylaxis and Swelling   • Citrullus Vulgaris Unknown - Low Severity   • Eggs Or Egg-Derived Products Unknown - Low Severity         Current Outpatient Medications:   •  amLODIPine (NORVASC) 10 MG tablet, Take 1 tablet by mouth Daily., Disp: 90 tablet, Rfl: 3  •  ASPIRIN LOW DOSE 81 MG chewable tablet, Chew 1 tablet Daily., Disp: 90 tablet, Rfl: 0  •   atorvastatin (LIPITOR) 40 MG tablet, Take 1 tablet by mouth Daily., Disp: 90 tablet, Rfl: 3  •  BIOTIN PO, Take  by mouth., Disp: , Rfl:   •  diclofenac (VOLTAREN) 1 % gel gel, Apply 4 g topically to the appropriate area as directed 4 (Four) Times a Day As Needed (pain)., Disp: 150 g, Rfl: 2  •  metFORMIN ER (GLUCOPHAGE-XR) 500 MG 24 hr tablet, Take 2 tablets by mouth 2 (Two) Times a Day., Disp: 360 tablet, Rfl: 1  •  metoprolol tartrate (LOPRESSOR) 50 MG tablet, Take 1 tablet by mouth 2 (Two) Times a Day., Disp: 180 tablet, Rfl: 1  •  multivitamin with minerals tablet tablet, Take 1 tablet by mouth Daily., Disp: , Rfl:   •  nitroglycerin (NITROSTAT) 0.4 MG SL tablet, Place 1 tablet under the tongue Every 5 (Five) Minutes As Needed for Chest Pain., Disp: 30 tablet, Rfl: 0  •  pantoprazole (Protonix) 40 MG EC tablet, Take 1 tablet by mouth Daily., Disp: 90 tablet, Rfl: 3  •  Probiotic Product (PROBIOTIC-10 PO), Take  by mouth., Disp: , Rfl:   •  sucralfate (Carafate) 1 g tablet, Take 1 tablet by mouth 3 (Three) Times a Day After Meals., Disp: 90 tablet, Rfl: 1    Current medication list contains no high risk medications.  No harmful drug interactions have been identified.     Family History   Problem Relation Age of Onset   • Heart attack Mother 67   • Stroke Mother    • Hypertension Mother    • Heart disease Mother 67   • Diabetes Father    • Hypertension Father    • Heart disease Father 58   • No Known Problems Other    • Heart attack Maternal Grandmother        Social History     Tobacco Use   • Smoking status: Former Smoker     Packs/day: 0.25     Years: 10.00     Pack years: 2.50     Types: Cigarettes     Start date: 8/20/1979     Quit date: 1/1/2012     Years since quitting: 10.3   • Smokeless tobacco: Never Used   • Tobacco comment: I quite in 1980, then started agin, then quite but finally stopped in 2021.   Substance Use Topics   • Alcohol use: Yes     Alcohol/week: 0.0 - 2.0 standard drinks     Comment:  social       Past Surgical History:   Procedure Laterality Date   • BREAST BIOPSY Left 02/14/2019    benign calcification   • CARDIAC CATHETERIZATION      x1 stent LAD at [facility]   • COLONOSCOPY  approx 2016    negative per pt    • CORONARY STENT PLACEMENT  01/15/2020       Patient Active Problem List   Diagnosis   • Type 2 diabetes mellitus ([facility])   • Hypertension   • Hyperlipidemia   • Esophageal reflux   • Visit for screening mammogram   • NSTEMI (non-ST elevated myocardial infarction) ([facility])   • S/P PTCA (percutaneous transluminal coronary angioplasty)   • Atypical chest pain   • Hospital discharge follow-up   • Anxiety   • Coronary artery disease involving native coronary artery of native heart with angina pectoris ([facility])   • Xyphoidalgia   • Abdominal bloating   • Encounter for annual health examination   • Trigger middle finger of right hand       Review of Systems   Constitutional: Negative for activity change, appetite change, fatigue, fever, unexpected weight gain and unexpected weight loss.   HENT: Negative for nosebleeds, rhinorrhea, trouble swallowing and voice change.    Eyes: Negative for visual disturbance.   Respiratory: Negative for cough, chest tightness, shortness of breath and wheezing.    Cardiovascular: Negative for chest pain, palpitations and leg swelling.   Gastrointestinal: Negative for abdominal pain, blood in stool, constipation, diarrhea, nausea, vomiting, GERD and indigestion.   Genitourinary: Negative for dysuria, frequency and hematuria.   Musculoskeletal: Negative for arthralgias, back pain and myalgias.        Right middle finger pain and stiff with sticking.   Skin: Negative for rash and wound.   Neurological: Negative for dizziness, tremors, weakness, light-headedness, numbness, headache and memory problem.   Hematological: Negative for adenopathy. Does not bruise/bleed easily.   Psychiatric/Behavioral: Negative for sleep disturbance and depressed mood. The patient is not  "nervous/anxious.        Objective     Vitals:    04/20/22 0937   BP: 126/76   BP Location: Right arm   Patient Position: Sitting   Cuff Size: Large Adult   Pulse: 74   Temp: 98 °F (36.7 °C)   TempSrc: Temporal   SpO2: 98%   Weight: 90.7 kg (200 lb)   Height: 124.5 cm (49.02\")       Patient's Body mass index is 58.53 kg/m². indicating that she is morbidly obese (BMI > 40 or > 35 with obesity - related health condition). Obesity-related health conditions include the following: hypertension, coronary heart disease, diabetes mellitus and dyslipidemias. Obesity is improving with lifestyle modifications. BMI is is above average; BMI management plan is completed. We discussed portion control and increasing exercise..      No exam data present    Physical Exam  Vitals and nursing note reviewed.   Constitutional:       General: She is not in acute distress.     Appearance: She is well-developed. She is not diaphoretic.   HENT:      Head: Normocephalic and atraumatic.      Right Ear: External ear normal.      Left Ear: External ear normal.      Nose: Nose normal.   Eyes:      Conjunctiva/sclera: Conjunctivae normal.      Pupils: Pupils are equal, round, and reactive to light.   Neck:      Thyroid: No thyromegaly.      Trachea: No tracheal deviation.   Cardiovascular:      Rate and Rhythm: Normal rate and regular rhythm.      Heart sounds: Normal heart sounds. No murmur heard.    No friction rub. No gallop.   Pulmonary:      Effort: Pulmonary effort is normal. No respiratory distress.      Breath sounds: Normal breath sounds.   Abdominal:      General: Bowel sounds are normal.      Palpations: Abdomen is soft. There is no mass.      Tenderness: There is no abdominal tenderness. There is no guarding.   Musculoskeletal:         General: Normal range of motion.      Cervical back: Normal range of motion and neck supple.      Comments: Right middle finger stiff and unable to fully flex and stiff.   Lymphadenopathy:      Cervical: " No cervical adenopathy.   Skin:     General: Skin is warm and dry.      Capillary Refill: Capillary refill takes less than 2 seconds.      Findings: No rash.   Neurological:      Mental Status: She is alert and oriented to person, place, and time.      Motor: No abnormal muscle tone.      Deep Tendon Reflexes: Reflexes normal.   Psychiatric:         Behavior: Behavior normal.         Thought Content: Thought content normal.         Judgment: Judgment normal.     Recent Lab Results:  Lab Results   Component Value Date     (H) 01/16/2020     Lab Results   Component Value Date    TRIG 84 09/28/2021    HDL 72 09/28/2021    VLDL 16 09/28/2021       Assessment/Plan   Age-appropriate Screening Schedule:  Refer to the list below for future screening recommendations based on patient's age, sex and/or medical conditions.      Health Maintenance   Topic Date Due   • HEMOGLOBIN A1C  03/28/2022   • DIABETIC EYE EXAM  09/14/2022   • DIABETIC FOOT EXAM  09/28/2022   • LIPID PANEL  09/28/2022   • URINE MICROALBUMIN  09/28/2022   • MAMMOGRAM  05/18/2023   • PAP SMEAR  05/27/2024   • TDAP/TD VACCINES (2 - Td or Tdap) 02/18/2028   • ZOSTER VACCINE  Completed   • INFLUENZA VACCINE  Discontinued       Diagnoses and all orders for this visit:    1. Encounter for annual health examination (Primary)    2. Type 2 diabetes mellitus with other circulatory complication, without long-term current use of insulin (HCC)  -     Hemoglobin A1c  -     Comprehensive Metabolic Panel  -     Lipid Panel    3. Primary hypertension  -     Comprehensive Metabolic Panel  -     Lipid Panel    4. Hyperlipidemia, unspecified hyperlipidemia type  -     Comprehensive Metabolic Panel  -     Lipid Panel    5. Trigger middle finger of right hand  -     Ambulatory Referral to Hand Surgery    6. Atypical chest pain  -     nitroglycerin (NITROSTAT) 0.4 MG SL tablet; Place 1 tablet under the tongue Every 5 (Five) Minutes As Needed for Chest Pain.  Dispense: 30  tablet; Refill: 0    Annual wellness visit reviewed with patient.  All past history, medications, social history, and problem list were reviewed.  Discussed advanced directives and living will.  Patient has living will: Living will: no and information packet given to patient to complete at home and bring copy to office.  Will check the labs as ordered above to evaluate the blood sugars, kidney, liver, cholesterol for screening.  Discussed flu shot recommended to get the high-dose influenza vaccine annually in the fall.  Shingrix and pneumonia vaccination series discussed.  Encouraged follow-up with the eye doctor on annual basis.  Discussed weight and encouraged exercise as tolerated while following a healthy diet.  Reviewed sexual health and safe sex practices.  Colon cancer screening discussed and current status: colonoscopy completed 5/27/16 and repeat in 10 years.  Discussed female health and screening including Pap smear/ pelvic exam/ self breast exam/ mammogram.  Last mammogram 5/18/21. Follow up with current specialists as needed.     Will refer to hand surgeon for the trigger finger.    An After Visit Summary with all of these plans were given to the patient.        Follow Up:  No follow-ups on file.

## 2022-04-21 LAB
ALBUMIN SERPL-MCNC: 4.9 G/DL (ref 3.8–4.9)
ALBUMIN/GLOB SERPL: 1.9 {RATIO} (ref 1.2–2.2)
ALP SERPL-CCNC: 105 IU/L (ref 44–121)
ALT SERPL-CCNC: 25 IU/L (ref 0–32)
AST SERPL-CCNC: 22 IU/L (ref 0–40)
BILIRUB SERPL-MCNC: 0.4 MG/DL (ref 0–1.2)
BUN SERPL-MCNC: 11 MG/DL (ref 8–27)
BUN/CREAT SERPL: 14 (ref 12–28)
CALCIUM SERPL-MCNC: 10.3 MG/DL (ref 8.7–10.3)
CHLORIDE SERPL-SCNC: 106 MMOL/L (ref 96–106)
CHOLEST SERPL-MCNC: 166 MG/DL (ref 100–199)
CO2 SERPL-SCNC: 24 MMOL/L (ref 20–29)
CREAT SERPL-MCNC: 0.79 MG/DL (ref 0.57–1)
EGFRCR SERPLBLD CKD-EPI 2021: 86 ML/MIN/1.73
GLOBULIN SER CALC-MCNC: 2.6 G/DL (ref 1.5–4.5)
GLUCOSE SERPL-MCNC: 115 MG/DL (ref 65–99)
HBA1C MFR BLD: 6.8 % (ref 4.8–5.6)
HDLC SERPL-MCNC: 73 MG/DL
LDLC SERPL CALC-MCNC: 75 MG/DL (ref 0–99)
POTASSIUM SERPL-SCNC: 4.7 MMOL/L (ref 3.5–5.2)
PROT SERPL-MCNC: 7.5 G/DL (ref 6–8.5)
SODIUM SERPL-SCNC: 146 MMOL/L (ref 134–144)
TRIGL SERPL-MCNC: 101 MG/DL (ref 0–149)
VLDLC SERPL CALC-MCNC: 18 MG/DL (ref 5–40)

## 2022-04-22 ENCOUNTER — TRANSCRIBE ORDERS (OUTPATIENT)
Dept: ADMINISTRATIVE | Facility: HOSPITAL | Age: 62
End: 2022-04-22

## 2022-04-22 DIAGNOSIS — Z12.31 VISIT FOR SCREENING MAMMOGRAM: Primary | ICD-10-CM

## 2022-05-20 ENCOUNTER — HOSPITAL ENCOUNTER (OUTPATIENT)
Dept: MAMMOGRAPHY | Facility: HOSPITAL | Age: 62
Discharge: HOME | End: 2022-05-20
Admitting: INTERNAL MEDICINE

## 2022-05-20 DIAGNOSIS — Z12.31 VISIT FOR SCREENING MAMMOGRAM: ICD-10-CM

## 2022-05-20 PROCEDURE — 77063 BREAST TOMOSYNTHESIS BI: CPT

## 2022-05-20 PROCEDURE — 77067 SCR MAMMO BI INCL CAD: CPT

## 2022-07-12 ENCOUNTER — PATIENT MESSAGE (OUTPATIENT)
Dept: FAMILY MEDICINE CLINIC | Facility: CLINIC | Age: 62
End: 2022-07-12

## 2022-07-12 DIAGNOSIS — I10 ESSENTIAL HYPERTENSION: ICD-10-CM

## 2022-07-12 RX ORDER — AMLODIPINE BESYLATE 10 MG/1
10 TABLET ORAL DAILY
Qty: 90 TABLET | Refills: 3 | Status: SHIPPED | OUTPATIENT
Start: 2022-07-12 | End: 2022-08-05 | Stop reason: SDUPTHER

## 2022-07-12 NOTE — TELEPHONE ENCOUNTER
From: Kimberli Magallanes  To: Brandin Khalil MD  Sent: 7/12/2022 2:31 PM EDT  Subject: [facility]    I [facility] for am[facility]ipine, [facility] sent a message to the office requesting the electronic prescription.

## 2022-07-24 ENCOUNTER — PATIENT MESSAGE (OUTPATIENT)
Dept: FAMILY MEDICINE CLINIC | Facility: CLINIC | Age: 62
End: 2022-07-24

## 2022-07-24 DIAGNOSIS — I10 ESSENTIAL HYPERTENSION: ICD-10-CM

## 2022-07-25 NOTE — TELEPHONE ENCOUNTER
From: Kimberli Magallanes  To: Brandin Khlail MD  Sent: 7/24/2022 9:41 PM EDT  Subject: Metoprolol    Good evening,  I have had to send [facility] to [facility], I have totally run out of this medication. My next appointment with Dr. Khalil will be scheduled for October since I am on the 6th month schedule unless something comes up.

## 2022-07-26 ENCOUNTER — APPOINTMENT (OUTPATIENT)
Dept: GENERAL RADIOLOGY | Facility: HOSPITAL | Age: 62
End: 2022-07-26

## 2022-07-26 ENCOUNTER — HOSPITAL ENCOUNTER (EMERGENCY)
Facility: HOSPITAL | Age: 62
Discharge: HOME | End: 2022-07-26
Attending: EMERGENCY MEDICINE | Admitting: EMERGENCY MEDICINE

## 2022-07-26 VITALS
SYSTOLIC BLOOD PRESSURE: 138 MMHG | WEIGHT: 197 LBS | OXYGEN SATURATION: 98 % | HEIGHT: 59 IN | DIASTOLIC BLOOD PRESSURE: 90 MMHG | TEMPERATURE: 97.8 F | BODY MASS INDEX: 39.72 KG/M2 | RESPIRATION RATE: 16 BRPM | HEART RATE: 72 BPM

## 2022-07-26 DIAGNOSIS — I48.92 ATRIAL FLUTTER, UNSPECIFIED TYPE: Primary | ICD-10-CM

## 2022-07-26 DIAGNOSIS — E78.5 HYPERLIPIDEMIA, UNSPECIFIED HYPERLIPIDEMIA TYPE: ICD-10-CM

## 2022-07-26 DIAGNOSIS — E11.59 TYPE 2 DIABETES MELLITUS WITH OTHER CIRCULATORY COMPLICATION, WITHOUT LONG-TERM CURRENT USE OF INSULIN: ICD-10-CM

## 2022-07-26 LAB
ALBUMIN SERPL-MCNC: 4.6 G/DL (ref 3.5–5.2)
ALBUMIN/GLOB SERPL: 1.5 G/DL
ALP SERPL-CCNC: 94 U/L (ref 39–117)
ALT SERPL W P-5'-P-CCNC: 25 U/L (ref 1–33)
ANION GAP SERPL CALCULATED.3IONS-SCNC: 17.6 MMOL/L (ref 5–15)
AST SERPL-CCNC: 20 U/L (ref 1–32)
BASOPHILS # BLD AUTO: 0.05 10*3/MM3 (ref 0–0.2)
BASOPHILS NFR BLD AUTO: 0.5 % (ref 0–1.5)
BILIRUB SERPL-MCNC: 0.4 MG/DL (ref 0–1.2)
BUN SERPL-MCNC: 10 MG/DL (ref 8–23)
BUN/CREAT SERPL: 14.1 (ref 7–25)
CALCIUM SPEC-SCNC: 9.3 MG/DL (ref 8.6–10.5)
CHLORIDE SERPL-SCNC: 107 MMOL/L (ref 98–107)
CO2 SERPL-SCNC: 19.4 MMOL/L (ref 22–29)
CREAT SERPL-MCNC: 0.71 MG/DL (ref 0.57–1)
D DIMER PPP FEU-MCNC: 0.4 MCGFEU/ML (ref 0–0.49)
DEPRECATED RDW RBC AUTO: 46 FL (ref 37–54)
EGFRCR SERPLBLD CKD-EPI 2021: 97.5 ML/MIN/1.73
EOSINOPHIL # BLD AUTO: 0.12 10*3/MM3 (ref 0–0.4)
EOSINOPHIL NFR BLD AUTO: 1.3 % (ref 0.3–6.2)
ERYTHROCYTE [DISTWIDTH] IN BLOOD BY AUTOMATED COUNT: 14.4 % (ref 12.3–15.4)
GLOBULIN UR ELPH-MCNC: 3.1 GM/DL
GLUCOSE SERPL-MCNC: 168 MG/DL (ref 65–99)
HCT VFR BLD AUTO: 43.6 % (ref 34–46.6)
HGB BLD-MCNC: 14.2 G/DL (ref 12–15.9)
HOLD SPECIMEN: NORMAL
IMM GRANULOCYTES # BLD AUTO: 0.04 10*3/MM3 (ref 0–0.05)
IMM GRANULOCYTES NFR BLD AUTO: 0.4 % (ref 0–0.5)
INR PPP: 1.13 (ref 0.9–1.1)
LYMPHOCYTES # BLD AUTO: 1.51 10*3/MM3 (ref 0.7–3.1)
LYMPHOCYTES NFR BLD AUTO: 16 % (ref 19.6–45.3)
MAGNESIUM SERPL-MCNC: 1.5 MG/DL (ref 1.6–2.4)
MCH RBC QN AUTO: 28.9 PG (ref 26.6–33)
MCHC RBC AUTO-ENTMCNC: 32.6 G/DL (ref 31.5–35.7)
MCV RBC AUTO: 88.8 FL (ref 79–97)
MONOCYTES # BLD AUTO: 0.64 10*3/MM3 (ref 0.1–0.9)
MONOCYTES NFR BLD AUTO: 6.8 % (ref 5–12)
NEUTROPHILS NFR BLD AUTO: 7.06 10*3/MM3 (ref 1.7–7)
NEUTROPHILS NFR BLD AUTO: 75 % (ref 42.7–76)
NRBC BLD AUTO-RTO: 0 /100 WBC (ref 0–0.2)
NT-PROBNP SERPL-MCNC: 194 PG/ML (ref 0–900)
PLATELET # BLD AUTO: 238 10*3/MM3 (ref 140–450)
PMV BLD AUTO: 9.9 FL (ref 6–12)
POTASSIUM SERPL-SCNC: 3.5 MMOL/L (ref 3.5–5.2)
PROT SERPL-MCNC: 7.7 G/DL (ref 6–8.5)
PROTHROMBIN TIME: 14.4 SECONDS (ref 11.7–14.2)
QT INTERVAL: 343 MS
QT INTERVAL: 349 MS
RBC # BLD AUTO: 4.91 10*6/MM3 (ref 3.77–5.28)
SARS-COV-2 RNA RESP QL NAA+PROBE: NOT DETECTED
SODIUM SERPL-SCNC: 144 MMOL/L (ref 136–145)
TROPONIN T SERPL-MCNC: 0.02 NG/ML (ref 0–0.03)
TROPONIN T SERPL-MCNC: <0.01 NG/ML (ref 0–0.03)
WBC NRBC COR # BLD: 9.42 10*3/MM3 (ref 3.4–10.8)
WHOLE BLOOD HOLD COAG: NORMAL
WHOLE BLOOD HOLD SPECIMEN: NORMAL

## 2022-07-26 PROCEDURE — 84484 ASSAY OF TROPONIN QUANT: CPT | Performed by: EMERGENCY MEDICINE

## 2022-07-26 PROCEDURE — 71045 X-RAY EXAM CHEST 1 VIEW: CPT

## 2022-07-26 PROCEDURE — 83880 ASSAY OF NATRIURETIC PEPTIDE: CPT | Performed by: EMERGENCY MEDICINE

## 2022-07-26 PROCEDURE — 96365 THER/PROPH/DIAG IV INF INIT: CPT

## 2022-07-26 PROCEDURE — 80053 COMPREHEN METABOLIC PANEL: CPT | Performed by: EMERGENCY MEDICINE

## 2022-07-26 PROCEDURE — 85379 FIBRIN DEGRADATION QUANT: CPT | Performed by: EMERGENCY MEDICINE

## 2022-07-26 PROCEDURE — 25010000002 MAGNESIUM SULFATE 2 GM/50ML SOLUTION: Performed by: EMERGENCY MEDICINE

## 2022-07-26 PROCEDURE — 36415 COLL VENOUS BLD VENIPUNCTURE: CPT

## 2022-07-26 PROCEDURE — 85025 COMPLETE CBC W/AUTO DIFF WBC: CPT | Performed by: EMERGENCY MEDICINE

## 2022-07-26 PROCEDURE — 83735 ASSAY OF MAGNESIUM: CPT | Performed by: EMERGENCY MEDICINE

## 2022-07-26 PROCEDURE — 96375 TX/PRO/DX INJ NEW DRUG ADDON: CPT

## 2022-07-26 PROCEDURE — 96366 THER/PROPH/DIAG IV INF ADDON: CPT

## 2022-07-26 PROCEDURE — 93010 ELECTROCARDIOGRAM REPORT: CPT | Performed by: INTERNAL MEDICINE

## 2022-07-26 PROCEDURE — 93005 ELECTROCARDIOGRAM TRACING: CPT | Performed by: EMERGENCY MEDICINE

## 2022-07-26 PROCEDURE — U0003 INFECTIOUS AGENT DETECTION BY NUCLEIC ACID (DNA OR RNA); SEVERE ACUTE RESPIRATORY SYNDROME CORONAVIRUS 2 (SARS-COV-2) (CORONAVIRUS DISEASE [COVID-19]), AMPLIFIED PROBE TECHNIQUE, MAKING USE OF HIGH THROUGHPUT TECHNOLOGIES AS DESCRIBED BY CMS-2020-01-R: HCPCS | Performed by: EMERGENCY MEDICINE

## 2022-07-26 PROCEDURE — 85610 PROTHROMBIN TIME: CPT | Performed by: EMERGENCY MEDICINE

## 2022-07-26 PROCEDURE — 99284 EMERGENCY DEPT VISIT MOD MDM: CPT

## 2022-07-26 RX ORDER — METFORMIN HYDROCHLORIDE 500 MG/1
1000 TABLET, EXTENDED RELEASE ORAL 2 TIMES DAILY
Qty: 360 TABLET | Refills: 1 | Status: SHIPPED | OUTPATIENT
Start: 2022-07-26 | End: 2022-08-05 | Stop reason: SDUPTHER

## 2022-07-26 RX ORDER — MAGNESIUM SULFATE HEPTAHYDRATE 40 MG/ML
2 INJECTION, SOLUTION INTRAVENOUS ONCE
Status: COMPLETED | OUTPATIENT
Start: 2022-07-26 | End: 2022-07-26

## 2022-07-26 RX ORDER — ATORVASTATIN CALCIUM 40 MG/1
40 TABLET, FILM COATED ORAL DAILY
Qty: 90 TABLET | Refills: 3 | Status: SHIPPED | OUTPATIENT
Start: 2022-07-26 | End: 2022-08-05 | Stop reason: SDUPTHER

## 2022-07-26 RX ORDER — SUCRALFATE 1 G/1
1 TABLET ORAL
Qty: 90 TABLET | Refills: 1 | Status: SHIPPED | OUTPATIENT
Start: 2022-07-26 | End: 2022-09-01

## 2022-07-26 RX ORDER — METOPROLOL TARTRATE 50 MG/1
50 TABLET, FILM COATED ORAL 2 TIMES DAILY
Qty: 180 TABLET | Refills: 1 | Status: SHIPPED | OUTPATIENT
Start: 2022-07-26 | End: 2022-08-05 | Stop reason: SDUPTHER

## 2022-07-26 RX ORDER — SODIUM CHLORIDE 0.9 % (FLUSH) 0.9 %
10 SYRINGE (ML) INJECTION AS NEEDED
Status: DISCONTINUED | OUTPATIENT
Start: 2022-07-26 | End: 2022-07-26 | Stop reason: HOSPADM

## 2022-07-26 RX ADMIN — METOPROLOL TARTRATE 25 MG: 25 TABLET, FILM COATED ORAL at 10:42

## 2022-07-26 RX ADMIN — MAGNESIUM SULFATE HEPTAHYDRATE 2 G: 2 INJECTION, SOLUTION INTRAVENOUS at 13:51

## 2022-07-26 RX ADMIN — METOPROLOL TARTRATE 5 MG: 1 INJECTION, SOLUTION INTRAVENOUS at 09:58

## 2022-07-26 NOTE — TELEPHONE ENCOUNTER
Patient is having a hard time getting prescriptions filled with new pharmacy.     Patient is in the ED right now due to not having medications with Tachycardia. Patient is requesting the hospital give her two days worth of the medications. Patient also would like meds to go to local [facility] pharmacy until she can get things straightened out with [facility]

## 2022-07-27 ENCOUNTER — TELEPHONE (OUTPATIENT)
Dept: CARDIOLOGY | Facility: CLINIC | Age: 62
End: 2022-07-27

## 2022-08-05 ENCOUNTER — TELEPHONE (OUTPATIENT)
Dept: FAMILY MEDICINE CLINIC | Facility: CLINIC | Age: 62
End: 2022-08-05

## 2022-08-05 DIAGNOSIS — E78.5 HYPERLIPIDEMIA, UNSPECIFIED HYPERLIPIDEMIA TYPE: ICD-10-CM

## 2022-08-05 DIAGNOSIS — R07.89 ATYPICAL CHEST PAIN: ICD-10-CM

## 2022-08-05 DIAGNOSIS — K21.9 GASTROESOPHAGEAL REFLUX DISEASE WITHOUT ESOPHAGITIS: ICD-10-CM

## 2022-08-05 DIAGNOSIS — E11.59 TYPE 2 DIABETES MELLITUS WITH OTHER CIRCULATORY COMPLICATION, WITHOUT LONG-TERM CURRENT USE OF INSULIN: ICD-10-CM

## 2022-08-05 DIAGNOSIS — I10 ESSENTIAL HYPERTENSION: ICD-10-CM

## 2022-08-05 RX ORDER — METFORMIN HYDROCHLORIDE 500 MG/1
1000 TABLET, EXTENDED RELEASE ORAL 2 TIMES DAILY
Qty: 360 TABLET | Refills: 1 | Status: SHIPPED | OUTPATIENT
Start: 2022-08-05

## 2022-08-05 RX ORDER — METOPROLOL TARTRATE 50 MG/1
50 TABLET, FILM COATED ORAL 2 TIMES DAILY
Qty: 180 TABLET | Refills: 2 | Status: SHIPPED | OUTPATIENT
Start: 2022-08-05 | End: 2022-08-24 | Stop reason: SDUPTHER

## 2022-08-05 RX ORDER — AMLODIPINE BESYLATE 10 MG/1
10 TABLET ORAL DAILY
Qty: 90 TABLET | Refills: 3 | Status: SHIPPED | OUTPATIENT
Start: 2022-08-05 | End: 2023-02-14 | Stop reason: SDUPTHER

## 2022-08-05 RX ORDER — PANTOPRAZOLE SODIUM 40 MG/1
40 TABLET, DELAYED RELEASE ORAL DAILY
Qty: 30 TABLET | Refills: 0 | Status: SHIPPED | OUTPATIENT
Start: 2022-08-05 | End: 2022-08-26 | Stop reason: SDUPTHER

## 2022-08-05 RX ORDER — ATORVASTATIN CALCIUM 40 MG/1
40 TABLET, FILM COATED ORAL DAILY
Qty: 90 TABLET | Refills: 3 | Status: SHIPPED | OUTPATIENT
Start: 2022-08-05 | End: 2023-02-14 | Stop reason: SDUPTHER

## 2022-08-05 RX ORDER — NITROGLYCERIN 0.4 MG/1
0.4 TABLET SUBLINGUAL
Qty: 30 TABLET | Refills: 2 | Status: SHIPPED | OUTPATIENT
Start: 2022-08-05

## 2022-08-05 NOTE — TELEPHONE ENCOUNTER
Pharmacy Name:  [facility]    Pharmacy representative name: ANA MARÍA     Pharmacy representative phone number: [phone]*    What medication are you calling in regards to: metoprolol tartrate (LOPRESSOR) 50 MG tablet    What question does the pharmacy have: SHOWING DUPLICATE THERAPY ON FILE. SHOWING THAT THE PATIENT HAS A PRESCRIPTION FOR METOPROLOL TARTRATE 25 MG AT ANOTHER PHARMACY. [facility] REQUEST A CALL BACK TO CLARIFY.    Who is the provider that prescribed the medication: DR. SRINIVAS ALAN    Additional notes: REQUEST CALL BACK TO CLARIFY. USE REFERENCE NUMBER [id number]

## 2022-08-05 NOTE — TELEPHONE ENCOUNTER
Rx Refill Note  Requested Prescriptions     Pending Prescriptions Disp Refills   • nitroglycerin (NITROSTAT) 0.4 MG SL tablet 30 tablet 2     Sig: Place 1 tablet under the tongue Every 5 (Five) Minutes As Needed for Chest Pain.   • metFORMIN ER (GLUCOPHAGE-XR) 500 MG 24 hr tablet 360 tablet 1     Sig: Take 2 tablets by mouth 2 (Two) Times a Day.   • amLODIPine (NORVASC) 10 MG tablet 90 tablet 3     Sig: Take 1 tablet by mouth Daily.   • atorvastatin (LIPITOR) 40 MG tablet 90 tablet 3     Sig: Take 1 tablet by mouth Daily.   • pantoprazole (Protonix) 40 MG EC tablet 90 tablet 3     Sig: Take 1 tablet by mouth Daily.   • metoprolol tartrate (LOPRESSOR) 50 MG tablet 180 tablet 2     Sig: Take 1 tablet by mouth 2 (Two) Times a Day.      Last office visit with prescribing clinician: 4/20/2022      Next office visit with prescribing clinician: 8/9/2022      Patient has switched mail order pharmacy     Prescriptions pended for approval

## 2022-08-08 NOTE — TELEPHONE ENCOUNTER
THE PHARMACY IS CALLING BACK IN REGARDS TO THIS MEDICATION QUESTION.     PLEASE ADVISE [phone]    REFERENCE NUMBER: [id number]

## 2022-08-09 ENCOUNTER — OFFICE VISIT (OUTPATIENT)
Dept: FAMILY MEDICINE CLINIC | Facility: CLINIC | Age: 62
End: 2022-08-09

## 2022-08-09 VITALS
HEART RATE: 61 BPM | WEIGHT: 203 LBS | TEMPERATURE: 98.1 F | BODY MASS INDEX: 40.92 KG/M2 | OXYGEN SATURATION: 98 % | HEIGHT: 59 IN | SYSTOLIC BLOOD PRESSURE: 126 MMHG | DIASTOLIC BLOOD PRESSURE: 76 MMHG

## 2022-08-09 DIAGNOSIS — I48.92 ATRIAL FLUTTER, PAROXYSMAL: Primary | ICD-10-CM

## 2022-08-09 DIAGNOSIS — I10 PRIMARY HYPERTENSION: ICD-10-CM

## 2022-08-09 PROCEDURE — 99213 OFFICE O/P EST LOW 20 MIN: CPT | Performed by: INTERNAL MEDICINE

## 2022-08-09 NOTE — PROGRESS NOTES
Subjective   Kimberli Magallanes is a 61 y.o. female.     Chief Complaint   Patient presents with   • Hypertension       History of Present Illness   On 7/26/2022 patient was seen in emergency room and was noted to have atrial flutter.  Was given Lopressor IV symptoms resolved and patient converted in the emergency room.  This is discussed with Dr. Corrales.  Patient discharged home and started on metoprolol 50 mg twice a day.  Patient has appointment for follow-up with cardiology on 8/25/2022.  She was taking 75 mg twice a day for several days then tried decreasing to 50 mg but had the palpitations and had to go back to 75 mg twice a day.    Follow-up for hypertension.  Currently, has been feeling well and asymptomatic without any headaches,vision changes, cough, chest pain, shortness of breath, swelling, focal neurologic deficit, memory loss or syncope.  Has been taking the medications regularly and adherent with the regimen of amlodipine 10 mg, metoprolol 75 mg BID. Denies medication side effects and no significant interval events.       Follow-up for cholesterol.  Currently, has been feeling well without any myalgias, muscle aches, weakness, numbness, chest pain, short of breath or other issues.  Currently, is adherent with medication regimen of atorvastatin 40 mg and denies medication side effects. Is due for lab follow-up. Last LDL 4/20/22.    Here for follow-up of diabetes.  Patient states to have been compliant with medications and trying to exercise more.  has been having elevated blood sugars for the last 2 weeks with no change in medication other than MVI, biotin, probiotic.  Blood sugar monitoring - patient states has been running on average 115.  With a range of .  No episodes of hypoglycemia, nausea, vomiting, new rashes, syncope or other issues.  Currently, on metformin XR 1000 mg once a day.  Last A1c 4/20/22 6.2%.    The following portions of the patient's history were reviewed and updated as  appropriate: allergies, current medications, past family history, past medical history, past social history, past surgical history and problem list.    Depression Screen:  PHQ-2/PHQ-9 Depression Screening 4/20/2022   Little Interest or Pleasure in Doing Things 1-->several days   Feeling Down, Depressed or Hopeless 0-->not at all   Trouble Falling or Staying Asleep, or Sleeping Too Much 0-->not at all   Feeling Tired or Having Little Energy 0-->not at all   Poor Appetite or Overeating 0-->not at all   Feeling Bad about Yourself - or that You are a Failure or Have Let Yourself or Your Family Down 0-->not at all   Trouble Concentrating on Things, Such as Reading the Newspaper or Watching Television 0-->not at all   Moving or Speaking So Slowly that Other People Could Have Noticed? Or the Opposite - Being So Fidgety 0-->not at all   Thoughts that You Would be Better Off Dead or of Hurting Yourself in Some Way 0-->not at all   PHQ-9: Brief Depression Severity Measure Score 1   If You Checked Off Any Problems, How Difficult Have These Problems Made It For You to Do Your Work, Take Care of Things at Home, or Get Along with Other People? not difficult at all       Past Medical History:   Diagnosis Date   • Abnormal electrocardiogram    • Adverse reaction to ACE inhibitor drug    • Adverse reaction to angiotensin 2 receptor antagonist    • BMI 40.0-44.9, adult (HCC)    • Coronary artery disease 01/15/2020    Stent was placed.   • Diabetes mellitus (HCC)     Diet Controlled   • Encounter for annual health examination    • Encounter for hepatitis C screening test for low risk patient    • Esophageal reflux    • Foot deformity, acquired, right    • Glucosuria    • Hematuria, microscopic    • Hyperlipidemia    • Hypertension    • Refused influenza vaccine    • Visit for screening mammogram        Past Surgical History:   Procedure Laterality Date   • BREAST BIOPSY Left 02/14/2019    benign calcification   • CARDIAC  CATHETERIZATION      x1 stent LAD at [facility]   • COLONOSCOPY  approx 2016    negative per pt    • CORONARY STENT PLACEMENT  01/15/2020       Family History   Problem Relation Age of Onset   • Heart attack Mother 67   • Stroke Mother    • Hypertension Mother    • Heart disease Mother 67   • Diabetes Father    • Hypertension Father    • Heart disease Father 58   • Heart attack Maternal Grandmother    • No Known Problems Other    • Breast cancer Neg Hx        Social History     Socioeconomic History   • Marital status:    Tobacco Use   • Smoking status: Former Smoker     Packs/day: 0.25     Years: 10.00     Pack years: 2.50     Types: Cigarettes     Start date: 8/20/1979     Quit date: 1/1/2012     Years since quitting: 10.6   • Smokeless tobacco: Never Used   • Tobacco comment: I quite in 1980, then started agin, then quite but finally stopped in 2021.   Substance and Sexual Activity   • Alcohol use: Yes     Alcohol/week: 0.0 - 2.0 standard drinks     Comment: social   • Drug use: No   • Sexual activity: Not Currently     Birth control/protection: Post-menopausal       Current Outpatient Medications   Medication Sig Dispense Refill   • amLODIPine (NORVASC) 10 MG tablet Take 1 tablet by mouth Daily. 90 tablet 3   • ASPIRIN LOW DOSE 81 MG chewable tablet Chew 1 tablet Daily. 90 tablet 0   • atorvastatin (LIPITOR) 40 MG tablet Take 1 tablet by mouth Daily. 90 tablet 3   • BIOTIN PO Take  by mouth.     • diclofenac (VOLTAREN) 1 % gel gel Apply 4 g topically to the appropriate area as directed 4 (Four) Times a Day As Needed (pain). 150 g 2   • metFORMIN ER (GLUCOPHAGE-XR) 500 MG 24 hr tablet Take 2 tablets by mouth 2 (Two) Times a Day. 360 tablet 1   • metoprolol tartrate (LOPRESSOR) 25 MG tablet Take 25 mg by mouth 2 (Two) Times a Day.     • metoprolol tartrate (LOPRESSOR) 50 MG tablet Take 1 tablet by mouth 2 (Two) Times a Day. 180 tablet 2   • multivitamin with minerals tablet tablet Take 1 tablet by  "mouth Daily.     • nitroglycerin (NITROSTAT) 0.4 MG SL tablet Place 1 tablet under the tongue Every 5 (Five) Minutes As Needed for Chest Pain. 30 tablet 2   • pantoprazole (Protonix) 40 MG EC tablet Take 1 tablet by mouth Daily. 30 tablet 0   • Probiotic Product (PROBIOTIC-10 PO) Take  by mouth.     • sucralfate (Carafate) 1 g tablet Take 1 tablet by mouth 3 (Three) Times a Day After Meals. 90 tablet 1     No current facility-administered medications for this visit.       Review of Systems   Constitutional: Negative for activity change, appetite change, fatigue, fever, unexpected weight gain and unexpected weight loss.   HENT: Negative for nosebleeds, rhinorrhea, trouble swallowing and voice change.    Eyes: Negative for visual disturbance.   Respiratory: Negative for cough, chest tightness, shortness of breath and wheezing.    Cardiovascular: Negative for chest pain, palpitations and leg swelling.        History of palpitations with weakness, short of breath.   Gastrointestinal: Negative for abdominal pain, blood in stool, constipation, diarrhea, nausea, vomiting, GERD and indigestion.   Genitourinary: Negative for dysuria, frequency and hematuria.   Musculoskeletal: Negative for arthralgias, back pain and myalgias.   Skin: Negative for rash and wound.   Neurological: Negative for dizziness, tremors, weakness, light-headedness, numbness, headache and memory problem.   Hematological: Negative for adenopathy. Does not bruise/bleed easily.   Psychiatric/Behavioral: Negative for sleep disturbance and depressed mood. The patient is not nervous/anxious.        Objective   /76 (BP Location: Left arm, Patient Position: Sitting, Cuff Size: Large Adult)   Pulse 61   Temp 98.1 °F (36.7 °C) (Temporal)   Ht 149.9 cm (59.02\")   Wt 92.1 kg (203 lb)   SpO2 98%   BMI 40.98 kg/m²     Physical Exam  Vitals and nursing note reviewed.   Constitutional:       General: She is not in acute distress.     Appearance: She is " well-developed. She is obese. She is not diaphoretic.   HENT:      Head: Normocephalic and atraumatic.      Right Ear: External ear normal.      Left Ear: External ear normal.      Nose: Nose normal.   Eyes:      Conjunctiva/sclera: Conjunctivae normal.      Pupils: Pupils are equal, round, and reactive to light.   Neck:      Thyroid: No thyromegaly.      Trachea: No tracheal deviation.   Cardiovascular:      Rate and Rhythm: Normal rate and regular rhythm.      Heart sounds: Normal heart sounds. No murmur heard.    No friction rub. No gallop.   Pulmonary:      Effort: Pulmonary effort is normal. No respiratory distress.      Breath sounds: Normal breath sounds.   Abdominal:      General: Bowel sounds are normal.      Palpations: Abdomen is soft. There is no mass.      Tenderness: There is no abdominal tenderness. There is no guarding.   Musculoskeletal:         General: Normal range of motion.      Cervical back: Normal range of motion and neck supple.   Lymphadenopathy:      Cervical: No cervical adenopathy.   Skin:     General: Skin is warm and dry.      Capillary Refill: Capillary refill takes less than 2 seconds.      Findings: No rash.   Neurological:      Mental Status: She is alert and oriented to person, place, and time.      Motor: No abnormal muscle tone.      Deep Tendon Reflexes: Reflexes normal.   Psychiatric:         Behavior: Behavior normal.         Thought Content: Thought content normal.         Judgment: Judgment normal.         Recent Results (from the past 2016 hour(s))   Comprehensive Metabolic Panel    Collection Time: 07/26/22  9:23 AM    Specimen: Blood   Result Value Ref Range    Glucose 168 (H) 65 - 99 mg/dL    BUN 10 8 - 23 mg/dL    Creatinine 0.71 0.57 - 1.00 mg/dL    Sodium 144 136 - 145 mmol/L    Potassium 3.5 3.5 - 5.2 mmol/L    Chloride 107 98 - 107 mmol/L    CO2 19.4 (L) 22.0 - 29.0 mmol/L    Calcium 9.3 8.6 - 10.5 mg/dL    Total Protein 7.7 6.0 - 8.5 g/dL    Albumin 4.60 3.50 -  5.20 g/dL    ALT (SGPT) 25 1 - 33 U/L    AST (SGOT) 20 1 - 32 U/L    Alkaline Phosphatase 94 39 - 117 U/L    Total Bilirubin 0.4 0.0 - 1.2 mg/dL    Globulin 3.1 gm/dL    A/G Ratio 1.5 g/dL    BUN/Creatinine Ratio 14.1 7.0 - 25.0    Anion Gap 17.6 (H) 5.0 - 15.0 mmol/L    eGFR 97.5 >60.0 mL/min/1.73   Troponin    Collection Time: 07/26/22  9:23 AM    Specimen: Blood   Result Value Ref Range    Troponin T <0.010 0.000 - 0.030 ng/mL   CBC Auto Differential    Collection Time: 07/26/22  9:23 AM    Specimen: Blood   Result Value Ref Range    WBC 9.42 3.40 - 10.80 10*3/mm3    RBC 4.91 3.77 - 5.28 10*6/mm3    Hemoglobin 14.2 12.0 - 15.9 g/dL    Hematocrit 43.6 34.0 - 46.6 %    MCV 88.8 79.0 - 97.0 fL    MCH 28.9 26.6 - 33.0 pg    MCHC 32.6 31.5 - 35.7 g/dL    RDW 14.4 12.3 - 15.4 %    RDW-SD 46.0 37.0 - 54.0 fl    MPV 9.9 6.0 - 12.0 fL    Platelets 238 140 - 450 10*3/mm3    Neutrophil % 75.0 42.7 - 76.0 %    Lymphocyte % 16.0 (L) 19.6 - 45.3 %    Monocyte % 6.8 5.0 - 12.0 %    Eosinophil % 1.3 0.3 - 6.2 %    Basophil % 0.5 0.0 - 1.5 %    Immature Grans % 0.4 0.0 - 0.5 %    Neutrophils, Absolute 7.06 (H) 1.70 - 7.00 10*3/mm3    Lymphocytes, Absolute 1.51 0.70 - 3.10 10*3/mm3    Monocytes, Absolute 0.64 0.10 - 0.90 10*3/mm3    Eosinophils, Absolute 0.12 0.00 - 0.40 10*3/mm3    Basophils, Absolute 0.05 0.00 - 0.20 10*3/mm3    Immature Grans, Absolute 0.04 0.00 - 0.05 10*3/mm3    nRBC 0.0 0.0 - 0.2 /100 WBC   Green Top (Gel)    Collection Time: 07/26/22  9:23 AM   Result Value Ref Range    Extra Tube Hold for add-ons.    Lavender Top    Collection Time: 07/26/22  9:23 AM   Result Value Ref Range    Extra Tube hold for add-on    Light Blue Top    Collection Time: 07/26/22  9:23 AM   Result Value Ref Range    Extra Tube Hold for add-ons.    Protime-INR    Collection Time: 07/26/22  9:23 AM    Specimen: Blood   Result Value Ref Range    Protime 14.4 (H) 11.7 - 14.2 Seconds    INR 1.13 (H) 0.90 - 1.10   BNP    Collection Time:  07/26/22  9:23 AM    Specimen: Blood   Result Value Ref Range    proBNP 194.0 0.0 - 900.0 pg/mL   D-dimer, Quantitative    Collection Time: 07/26/22  9:23 AM    Specimen: Blood   Result Value Ref Range    D-Dimer, Quantitative 0.40 0.00 - 0.49 MCGFEU/mL   Magnesium    Collection Time: 07/26/22  9:23 AM    Specimen: Blood   Result Value Ref Range    Magnesium 1.5 (L) 1.6 - 2.4 mg/dL   ECG 12 Lead    Collection Time: 07/26/22  9:25 AM   Result Value Ref Range    QT Interval 343 ms   COVID-19,[facility]IM[facility] IN-HOUSE CEPHEID/SULEMA NP SWAB IN TRANSPORT MEDIA 8-12 HR TAT - Swab, Nasopharynx    Collection Time: 07/26/22  9:57 AM    Specimen: Nasopharynx; Swab   Result Value Ref Range    COVID19 Not Detected Not Detected - Ref. Range   ECG 12 Lead    Collection Time: 07/26/22 10:34 AM   Result Value Ref Range    QT Interval 349 ms   Troponin    Collection Time: 07/26/22  1:35 PM    Specimen: Arm, Right; Blood   Result Value Ref Range    Troponin T 0.020 0.000 - 0.030 ng/mL     Assessment & Plan   Diagnoses and all orders for this visit:    1. Atrial flutter, paroxysmal (HCC) (Primary)    2. Primary hypertension    Stay on the metoprolol 75 mg twice a day and follow up with cardiology.  Likely will need the EP doctor.  Labs and records reviewed.           · COVID-19 Precautions - Patient was compliant in wearing a mask. When I saw the patient, I used appropriate personal protective equipment (PPE) including mask and eye shield (standard procedure).  Additionally, I used gown and gloves if indicated.  Hand hygiene was completed before and after seeing the patient.  · Dictated utilizing Dragon Dictation

## 2022-08-24 DIAGNOSIS — I10 ESSENTIAL HYPERTENSION: ICD-10-CM

## 2022-08-24 RX ORDER — METOPROLOL TARTRATE 50 MG/1
50 TABLET, FILM COATED ORAL 2 TIMES DAILY
Qty: 180 TABLET | Refills: 2 | Status: SHIPPED | OUTPATIENT
Start: 2022-08-24

## 2022-08-24 NOTE — TELEPHONE ENCOUNTER
Caller: Kimberli Magallanes    Relationship: Self    Best call back number: [phone]    Requested Prescriptions:   Requested Prescriptions     Pending Prescriptions Disp Refills   • metoprolol tartrate (LOPRESSOR) 50 MG tablet 180 tablet 2     Sig: Take 1 tablet by mouth 2 (Two) Times a Day.   • metoprolol tartrate (LOPRESSOR) 25 MG tablet       Sig: Take 1 tablet by mouth 2 (Two) Times a Day.        Pharmacy where request should be sent: [facility] - [address] [phone] [facility] [phone]      Additional details provided by patient: PATIENT STATES NEEDS BOTH PRESCRIPTIONS SHE TO TAKE 75 MG TWICE A DAY OF MEDICATION     Does the patient have less than a 3 day supply:  [x] Yes  [] No    Kathleen Gamino, Koby Beltran   08/24/22 13:49 EDT

## 2022-08-25 ENCOUNTER — OFFICE VISIT (OUTPATIENT)
Dept: CARDIOLOGY | Facility: CLINIC | Age: 62
End: 2022-08-25

## 2022-08-25 VITALS
DIASTOLIC BLOOD PRESSURE: 98 MMHG | BODY MASS INDEX: 42.58 KG/M2 | WEIGHT: 211.2 LBS | HEART RATE: 59 BPM | HEIGHT: 59 IN | SYSTOLIC BLOOD PRESSURE: 150 MMHG

## 2022-08-25 DIAGNOSIS — I10 PRIMARY HYPERTENSION: ICD-10-CM

## 2022-08-25 DIAGNOSIS — E11.59 TYPE 2 DIABETES MELLITUS WITH OTHER CIRCULATORY COMPLICATION, WITHOUT LONG-TERM CURRENT USE OF INSULIN: ICD-10-CM

## 2022-08-25 DIAGNOSIS — Z98.61 S/P PTCA (PERCUTANEOUS TRANSLUMINAL CORONARY ANGIOPLASTY): ICD-10-CM

## 2022-08-25 DIAGNOSIS — I48.92 ATRIAL FLUTTER WITH RAPID VENTRICULAR RESPONSE: ICD-10-CM

## 2022-08-25 DIAGNOSIS — I25.119 CORONARY ARTERY DISEASE INVOLVING NATIVE CORONARY ARTERY OF NATIVE HEART WITH ANGINA PECTORIS: Primary | ICD-10-CM

## 2022-08-25 DIAGNOSIS — E78.5 HYPERLIPIDEMIA, UNSPECIFIED HYPERLIPIDEMIA TYPE: ICD-10-CM

## 2022-08-25 PROCEDURE — 93000 ELECTROCARDIOGRAM COMPLETE: CPT | Performed by: INTERNAL MEDICINE

## 2022-08-25 PROCEDURE — 99214 OFFICE O/P EST MOD 30 MIN: CPT | Performed by: INTERNAL MEDICINE

## 2022-08-25 NOTE — PROGRESS NOTES
Date of Office Visit: 22    Encounter Provider: Lex Morales MD  Place of Service: [facility] CARDIOLOGY  Patient Name: Kimberli Magallanes  :1960    Chief Complaint   Patient presents with   • Atrial Flutter     HPI:  61-year-old with medical history of coronary artery disease, non-ST elevation MI, essential hypertension, diabetes mellitus, who had a non-ST elevation MI in the [facility] in 2020.  She had a long first diagonal versus ramus with an 80% stenosis that was stented.  She had mild disease in the LAD and the RCA was nondominant.    Since her last visit she ran out of her metoprolol and went into atrial flutter with 2:1 conduction.  She subsequently was given IV metoprolol in the ER and converted back to sinus.  She states that occasionally she will have palpitations but has not had any recurrence of atrial flutter.  She is not certain on anticoagulant therapy.  Denies any chest pain or MANZO.       Past Medical History:   Diagnosis Date   • Abnormal electrocardiogram    • Adverse reaction to ACE inhibitor drug    • Adverse reaction to angiotensin 2 receptor antagonist    • BMI 40.0-44.9, adult (HCC)    • Coronary artery disease 01/15/2020    Stent was placed.   • Diabetes mellitus (HCC)     Diet Controlled   • Encounter for annual health examination    • Encounter for hepatitis C screening test for low risk patient    • Esophageal reflux    • Foot deformity, acquired, right    • Glucosuria    • Hematuria, microscopic    • Hyperlipidemia    • Hypertension    • Refused influenza vaccine    • Visit for screening mammogram        Past Surgical History:   Procedure Laterality Date   • BREAST BIOPSY Left 2019    benign calcification   • CARDIAC CATHETERIZATION      x1 stent LAD at [facility]   • COLONOSCOPY  approx     negative per pt    • CORONARY STENT PLACEMENT  01/15/2020       Social History     Socioeconomic History   • Marital status:     Tobacco Use   • Smoking status: Former Smoker     Packs/day: 0.25     Years: 10.00     Pack years: 2.50     Types: Cigarettes     Start date: 8/20/1979     Quit date: 1/1/2012     Years since quitting: 10.6   • Smokeless tobacco: Never Used   • Tobacco comment: I quite in 1980, then started agin, then quite but finally stopped in 2021.   Substance and Sexual Activity   • Alcohol use: Yes     Alcohol/week: 0.0 - 2.0 standard drinks     Comment: social   • Drug use: No   • Sexual activity: Not Currently     Birth control/protection: Post-menopausal       Family History   Problem Relation Age of Onset   • Heart attack Mother 67   • Stroke Mother    • Hypertension Mother    • Heart disease Mother 67   • Diabetes Father    • Hypertension Father    • Heart disease Father 58   • Heart attack Maternal Grandmother    • No Known Problems Other    • Breast cancer Neg Hx        Review of Systems   Constitutional: Negative for fever and malaise/fatigue.   HENT: Negative for nosebleeds and sore throat.    Eyes: Negative for blurred vision and double vision.   Cardiovascular: Negative for chest pain, claudication, palpitations and syncope.   Respiratory: Negative for cough, shortness of breath and snoring.    Endocrine: Negative for cold intolerance, heat intolerance and polydipsia.   Skin: Negative for itching, poor wound healing and rash.   Musculoskeletal: Negative for joint pain, joint swelling, muscle weakness and myalgias.   Gastrointestinal: Negative for abdominal pain, melena, nausea and vomiting.   Neurological: Negative for light-headedness, loss of balance, seizures, vertigo and weakness.   Psychiatric/Behavioral: Negative for altered mental status and depression.       Allergies   Allergen Reactions   • Lisinopril Anaphylaxis and Swelling   • Losartan Anaphylaxis and Swelling   • Citrullus Vulgaris Unknown - Low Severity   • Eggs Or Egg-Derived Products Unknown - Low Severity         Current Outpatient  "Medications:   •  amLODIPine (NORVASC) 10 MG tablet, Take 1 tablet by mouth Daily., Disp: 90 tablet, Rfl: 3  •  ASPIRIN LOW DOSE 81 MG chewable tablet, Chew 1 tablet Daily., Disp: 90 tablet, Rfl: 0  •  atorvastatin (LIPITOR) 40 MG tablet, Take 1 tablet by mouth Daily., Disp: 90 tablet, Rfl: 3  •  BIOTIN PO, Take  by mouth., Disp: , Rfl:   •  diclofenac (VOLTAREN) 1 % gel gel, Apply 4 g topically to the appropriate area as directed 4 (Four) Times a Day As Needed (pain)., Disp: 150 g, Rfl: 2  •  metFORMIN ER (GLUCOPHAGE-XR) 500 MG 24 hr tablet, Take 2 tablets by mouth 2 (Two) Times a Day., Disp: 360 tablet, Rfl: 1  •  metoprolol tartrate (LOPRESSOR) 25 MG tablet, Take 1 tablet by mouth 2 (Two) Times a Day., Disp: 180 tablet, Rfl: 2  •  metoprolol tartrate (LOPRESSOR) 50 MG tablet, Take 1 tablet by mouth 2 (Two) Times a Day., Disp: 180 tablet, Rfl: 2  •  multivitamin with minerals tablet tablet, Take 1 tablet by mouth Daily., Disp: , Rfl:   •  nitroglycerin (NITROSTAT) 0.4 MG SL tablet, Place 1 tablet under the tongue Every 5 (Five) Minutes As Needed for Chest Pain., Disp: 30 tablet, Rfl: 2  •  pantoprazole (Protonix) 40 MG EC tablet, Take 1 tablet by mouth Daily., Disp: 30 tablet, Rfl: 0  •  Probiotic Product (PROBIOTIC-10 PO), Take  by mouth., Disp: , Rfl:   •  sucralfate (Carafate) 1 g tablet, Take 1 tablet by mouth 3 (Three) Times a Day After Meals., Disp: 90 tablet, Rfl: 1      Objective:     Vitals:    08/25/22 0759   BP: 150/98   Pulse: 59   Weight: 95.8 kg (211 lb 3.2 oz)   Height: 149.9 cm (59\")     Body mass index is 42.66 kg/m².    PHYSICAL EXAM:    Constitutional:       General: Not in acute distress.     Appearance: Normal appearance. Well-developed.   Eyes:      Pupils: Pupils are equal, round, and reactive to light.   HENT:      Head: Normocephalic.   Neck:      Vascular: No carotid bruit or JVD.   Pulmonary:      Effort: Pulmonary effort is normal. No tachypnea.      Breath sounds: Normal breath sounds. " No wheezing. No rales.   Cardiovascular:      Normal rate. Regular rhythm.      No gallop.   Pulses:     Intact distal pulses.   Edema:     Pretibial: bilateral 1+ edema of the pretibial area.     Ankle: bilateral 1+ edema of the ankle.  Abdominal:      General: Bowel sounds are normal.      Palpations: Abdomen is soft.      Tenderness: There is no abdominal tenderness.   Musculoskeletal: Normal range of motion.      Cervical back: Normal range of motion and neck supple. No edema. Skin:     General: Skin is warm and dry.   Neurological:      Mental Status: Alert and oriented to person, place, and time.           ECG 12 Lead    Date/Time: 8/25/2022 8:56 AM  Performed by: Lex Morales MD  Authorized by: Lex Morales MD   Comparison: compared with previous ECG from 7/26/2022  Similar to previous ECG  Rhythm: sinus rhythm  Rate: normal  QRS axis: normal    Clinical impression: normal ECG              Assessment:      No diagnosis found.  No orders of the defined types were placed in this encounter.     Plan:   61-year-old female with a medical history of coronary artery disease, PCI, hypertension, hyperlipidemia, diabetes mellitus and atrial flutter with RVR off of beta-blockade who presents back in follow-up.  She is in sinus rhythm.  She denies any recurrence of atrial flutter but does have palpitations which have not been adequately evaluated at this time.  She denies any chest pain    1.  Paroxysmal atrial flutter: Isolated episode when off of beta-blockade  -Recommend placing a Zio patch.  If nonsustained episodes are present or recurrence documented recommend anticoagulant.  Otherwise I would plan on leaving her off  -Continue metoprolol tartrate 75 mg p.o. every 12 hours.  She is aware.    2.  Essential hypertension: Mildly elevated today.  She states that she gets nervous when she comes into see us.    3.  Coronary artery disease with prior PCI.  No angina at this time.  Continue aspirin 81  mg p.o. daily along with statin therapy.  Tolerating well with no myalgias.       Your medication list          Accurate as of August 25, 2022  8:56 AM. If you have any questions, ask your nurse or doctor.            CONTINUE taking these medications      Instructions Last Dose Given Next Dose Due   amLODIPine 10 MG tablet  Commonly known as: NORVASC      Take 1 tablet by mouth Daily.       Aspirin Low Dose 81 MG chewable tablet  Generic drug: aspirin      Chew 1 tablet Daily.       atorvastatin 40 MG tablet  Commonly known as: LIPITOR      Take 1 tablet by mouth Daily.       BIOTIN PO      Take  by mouth.       diclofenac 1 % gel gel  Commonly known as: VOLTAREN      Apply 4 g topically to the appropriate area as directed 4 (Four) Times a Day As Needed (pain).       metFORMIN  MG 24 hr tablet  Commonly known as: GLUCOPHAGE-XR      Take 2 tablets by mouth 2 (Two) Times a Day.       metoprolol tartrate 50 MG tablet  Commonly known as: LOPRESSOR      Take 1 tablet by mouth 2 (Two) Times a Day.       metoprolol tartrate 25 MG tablet  Commonly known as: LOPRESSOR      Take 1 tablet by mouth 2 (Two) Times a Day.       multivitamin with minerals tablet tablet      Take 1 tablet by mouth Daily.       nitroglycerin 0.4 MG SL tablet  Commonly known as: NITROSTAT      Place 1 tablet under the tongue Every 5 (Five) Minutes As Needed for Chest Pain.       pantoprazole 40 MG EC tablet  Commonly known as: Protonix      Take 1 tablet by mouth Daily.       PROBIOTIC-10 PO      Take  by mouth.       sucralfate 1 g tablet  Commonly known as: Carafate      Take 1 tablet by mouth 3 (Three) Times a Day After Meals.

## 2022-08-26 DIAGNOSIS — K21.9 GASTROESOPHAGEAL REFLUX DISEASE WITHOUT ESOPHAGITIS: ICD-10-CM

## 2022-08-26 RX ORDER — PANTOPRAZOLE SODIUM 40 MG/1
40 TABLET, DELAYED RELEASE ORAL DAILY
Qty: 90 TABLET | Refills: 1 | Status: SHIPPED | OUTPATIENT
Start: 2022-08-26 | End: 2023-02-09

## 2022-08-26 NOTE — TELEPHONE ENCOUNTER
Rx Refill Note  Requested Prescriptions     Pending Prescriptions Disp Refills   • pantoprazole (Protonix) 40 MG EC tablet 30 tablet 0     Sig: Take 1 tablet by mouth Daily.      Last office visit with prescribing clinician: 8/9/2022      Next office visit with prescribing clinician: rain 2/9/2023    Lenka Tee MA  08/26/22, 11:59 EDT

## 2022-09-01 ENCOUNTER — OFFICE VISIT (OUTPATIENT)
Dept: OBSTETRICS AND GYNECOLOGY | Age: 62
End: 2022-09-01

## 2022-09-01 VITALS
DIASTOLIC BLOOD PRESSURE: 78 MMHG | SYSTOLIC BLOOD PRESSURE: 128 MMHG | WEIGHT: 209 LBS | BODY MASS INDEX: 42.13 KG/M2 | HEIGHT: 59 IN

## 2022-09-01 DIAGNOSIS — Z01.419 WELL WOMAN EXAM WITH ROUTINE GYNECOLOGICAL EXAM: Primary | ICD-10-CM

## 2022-09-01 PROCEDURE — 99396 PREV VISIT EST AGE 40-64: CPT | Performed by: PHYSICIAN ASSISTANT

## 2022-09-01 NOTE — PROGRESS NOTES
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  who presents for annual exam.  Obstetric History:  OB History        2    Para   2    Term   2            AB        Living           SAB        IAB        Ectopic        Molar        Multiple        Live Births                   Menstrual History:     No LMP recorded (lmp unknown). Patient is postmenopausal.         Current contraception: post menopausal status  History of abnormal Pap smear: no  Received Gardasil immunization: no  Perform regular self breast exam: yes - occl  Family history of uterine or ovarian cancer: no  Family History of colon cancer: no  Family history of breast cancer: no    Mammogram: up to date.  Colonoscopy: up to date.  DEXA: not indicated.    Exercise: not active  Calcium/Vitamin D: adequate intake    The following portions of the patient's history were reviewed and updated as appropriate: allergies, current medications, past family history, past medical history, past social history, past surgical history and problem list.    Review of Systems   All other systems reviewed and are negative.      Review of Systems   Constitutional: Negative for fatigue.   Respiratory: Negative for shortness of breath.    Gastrointestinal: Negative for abdominal pain.   Genitourinary: Negative for dysuria.   Neurological:  "Negative for headaches.   Psychiatric/Behavioral: Negative for dysphoric mood.         Objective   Physical Exam    /78   Ht 149.9 cm (59\")   Wt 94.8 kg (209 lb)   LMP  (LMP Unknown)   Breastfeeding No   BMI 42.21 kg/m²   General:   alert, comfortable and no distress   Heart: regular rate and rhythm   Lungs: clear to auscultation bilaterally   Breast: normal appearance, no masses or tenderness, Inspection negative, No nipple retraction or dimpling, No nipple discharge or bleeding, No axillary or supraclavicular adenopathy, Normal to palpation without dominant masses, device noted above left breast   Neck: no adenopathy and no carotid bruit   Abdomen: normal findings: soft, non-tender   CVA: Not performed today   Pelvis: External genitalia: normal general appearance  Vaginal: normal mucosa without prolapse or lesions  Cervix: normal appearance  Adnexa: normal bimanual exam  Uterus: normal single, nontender  Rectal: deferred   Extremities: Not performed today   Neurologic: negative   Psychiatric: Normal affect, judgement, and mood     Assessment & Plan   Diagnoses and all orders for this visit:    1. Well woman exam with routine gynecological exam (Primary)        All questions answered.  Breast self exam technique reviewed and patient encouraged to perform self-exam monthly.  Discussed healthy lifestyle modifications.  Recommended 30 minutes of aerobic exercise five times per week.  Discussed calcium needs to prevent osteoporosis.    Pap utd  Enc to add in exercise, chair exercises!  Call or any issues  F/u in 1 year               "

## 2022-09-23 ENCOUNTER — TELEPHONE (OUTPATIENT)
Dept: CARDIOLOGY | Facility: CLINIC | Age: 62
End: 2022-09-23

## 2022-09-23 LAB
MAXIMAL PREDICTED HEART RATE: 160 BPM
STRESS TARGET HR: 136 BPM

## 2022-09-23 NOTE — TELEPHONE ENCOUNTER
Notified patient of results. Patient verbalized understanding. She said that Dr. Morales said something about starting her on a blood thinner. I told her that she probably does not need to be on once since her heart monitor did not show any evidence of afib/flutter, but I would double check to make sure. Does the patient need to be started on a blood thinner?    Patient request that we send her a [facility] message with the response on whether she needs to be on a blood thinner or not.    Selma David RN  [facility] [place]

## 2022-09-23 NOTE — TELEPHONE ENCOUNTER
----- Message from INOCENTE Rdz sent at 9/23/2022 10:09 AM EDT -----  Please let the patient know that her heart monitor study was normal and she does not have any atrial fibrillation or atrial flutter noted.

## 2022-11-30 ENCOUNTER — OFFICE VISIT (OUTPATIENT)
Dept: FAMILY MEDICINE CLINIC | Facility: CLINIC | Age: 62
End: 2022-11-30

## 2022-11-30 VITALS
DIASTOLIC BLOOD PRESSURE: 78 MMHG | HEART RATE: 86 BPM | SYSTOLIC BLOOD PRESSURE: 122 MMHG | TEMPERATURE: 97.2 F | OXYGEN SATURATION: 98 % | BODY MASS INDEX: 43.75 KG/M2 | HEIGHT: 59 IN | WEIGHT: 217 LBS

## 2022-11-30 DIAGNOSIS — K44.9 HIATAL HERNIA: Primary | ICD-10-CM

## 2022-11-30 DIAGNOSIS — R30.0 DYSURIA: ICD-10-CM

## 2022-11-30 DIAGNOSIS — N30.91 HEMATURIA DUE TO CYSTITIS: ICD-10-CM

## 2022-11-30 LAB
BILIRUB BLD-MCNC: NEGATIVE MG/DL
CLARITY, POC: CLEAR
COLOR UR: YELLOW
EXPIRATION DATE: ABNORMAL
GLUCOSE UR STRIP-MCNC: NEGATIVE MG/DL
KETONES UR QL: NEGATIVE
LEUKOCYTE EST, POC: NEGATIVE
Lab: ABNORMAL
NITRITE UR-MCNC: NEGATIVE MG/ML
PH UR: 6 [PH] (ref 5–8)
PROT UR STRIP-MCNC: NEGATIVE MG/DL
RBC # UR STRIP: ABNORMAL /UL
SP GR UR: 1.01 (ref 1–1.03)
UROBILINOGEN UR QL: NORMAL

## 2022-11-30 PROCEDURE — 99213 OFFICE O/P EST LOW 20 MIN: CPT | Performed by: NURSE PRACTITIONER

## 2022-11-30 PROCEDURE — 81003 URINALYSIS AUTO W/O SCOPE: CPT | Performed by: NURSE PRACTITIONER

## 2022-12-02 LAB
BACTERIA UR CULT: NORMAL
BACTERIA UR CULT: NORMAL

## 2022-12-15 ENCOUNTER — OFFICE VISIT (OUTPATIENT)
Dept: SURGERY | Facility: CLINIC | Age: 62
End: 2022-12-15

## 2022-12-15 VITALS — WEIGHT: 221 LBS | BODY MASS INDEX: 44.55 KG/M2 | HEIGHT: 59 IN

## 2022-12-15 DIAGNOSIS — K21.9 GASTROESOPHAGEAL REFLUX DISEASE, UNSPECIFIED WHETHER ESOPHAGITIS PRESENT: ICD-10-CM

## 2022-12-15 DIAGNOSIS — R10.13 EPIGASTRIC DISCOMFORT: ICD-10-CM

## 2022-12-15 DIAGNOSIS — R68.81 EARLY SATIETY: ICD-10-CM

## 2022-12-15 DIAGNOSIS — R14.0 BLOATING: Primary | ICD-10-CM

## 2022-12-15 PROCEDURE — 99214 OFFICE O/P EST MOD 30 MIN: CPT | Performed by: PHYSICIAN ASSISTANT

## 2022-12-16 NOTE — PROGRESS NOTES
"ASSESSMENT/PLAN:    This is a 62-year-old lady presenting with a known hiatal hernia with her symptoms of reflux being moderately well controlled on pantoprazole.  With the vast majority of her symptoms being the epigastric discomfort and bloating even with ingestion of pills, Dr. Black and I have recommended proceeding with a right upper quadrant ultrasound to investigate her gallbladder as well as completing the gastric emptying scan.  Once we have these results we will discuss any additional follow-up that we recommend at that time.  All questions were answered and she was willing to proceed with all recommendations.    CC:     Hiatal hernia    HPI:    This is a 62-year-old lady presenting to the office today at the request of Dr. Amanda Martínez for consultation.  For years now she has had a sensation of epigastric discomfort with a bloated/distended feeling frequently at this area, in particular with meals or even with medication.  She does have occasional episodes of nausea and has GERD that is moderately well controlled on pantoprazole.  She does state that she awakens at night with a sour taste in the back of her throat as well as having her \"gums being affected\" by this.  As a result of her symptoms she was seen by her gastroenterologist who performed an EGD on her which demonstrated a medium sized hiatal hernia.  At that time she was also recommended to undergo a gastric emptying test but she elected not to.  She denies having dark tarry stools, bright red blood with her bowel movements, abdominal pain elsewhere or any other complaints.    ENDOSCOPY:   • Colonoscopy 2016  • EGD 2021    SOCIAL HISTORY:   • Denies tobacco use  • Occasional alcohol use    FAMILY HISTORY:    • Colorectal cancer: None  • Gallbladder: None    PREVIOUS ABDOMINAL SURGERY    • None    OTHER SURGERY  Past Surgical History:   Procedure Laterality Date   • BREAST BIOPSY Left 02/14/2019    benign calcification   • CARDIAC " CATHETERIZATION      x1 stent LAD at [facility]   • COLONOSCOPY  approx 2016    negative per pt    • CORONARY STENT PLACEMENT  01/15/2020   • ENDOSCOPY  2021    Dr. Martínez       PAST MEDICAL HISTORY:    Past Medical History:   Diagnosis Date   • Abnormal electrocardiogram    • Adverse reaction to ACE inhibitor drug    • Adverse reaction to angiotensin 2 receptor antagonist    • BMI 40.0-44.9, adult (HCC)    • Coronary artery disease 01/15/2020    Stent was placed.   • Diabetes mellitus (HCC)     Diet Controlled   • Encounter for annual health examination    • Encounter for hepatitis C screening test for low risk patient    • Esophageal reflux    • Foot deformity, acquired, right    • Glucosuria    • Hematuria, microscopic    • Hyperlipidemia    • Hypertension    • Refused influenza vaccine    • Visit for screening mammogram        MEDICATIONS:     Current Outpatient Medications:   •  amLODIPine (NORVASC) 10 MG tablet, Take 1 tablet by mouth Daily., Disp: 90 tablet, Rfl: 3  •  ASPIRIN LOW DOSE 81 MG chewable tablet, Chew 1 tablet Daily., Disp: 90 tablet, Rfl: 0  •  atorvastatin (LIPITOR) 40 MG tablet, Take 1 tablet by mouth Daily., Disp: 90 tablet, Rfl: 3  •  diclofenac (VOLTAREN) 1 % gel gel, Apply 4 g topically to the appropriate area as directed 4 (Four) Times a Day As Needed (pain)., Disp: 150 g, Rfl: 2  •  metFORMIN ER (GLUCOPHAGE-XR) 500 MG 24 hr tablet, Take 2 tablets by mouth 2 (Two) Times a Day., Disp: 360 tablet, Rfl: 1  •  metoprolol tartrate (LOPRESSOR) 25 MG tablet, Take 1 tablet by mouth 2 (Two) Times a Day., Disp: 180 tablet, Rfl: 2  •  metoprolol tartrate (LOPRESSOR) 50 MG tablet, Take 1 tablet by mouth 2 (Two) Times a Day., Disp: 180 tablet, Rfl: 2  •  multivitamin with minerals tablet tablet, Take 1 tablet by mouth Daily., Disp: , Rfl:   •  nitroglycerin (NITROSTAT) 0.4 MG SL tablet, Place 1 tablet under the tongue Every 5 (Five) Minutes As Needed for Chest Pain., Disp: 30 tablet, Rfl: 2  •   "pantoprazole (Protonix) 40 MG EC tablet, Take 1 tablet by mouth Daily., Disp: 90 tablet, Rfl: 1    ALLERGIES:   Allergies   Allergen Reactions   • Lisinopril Anaphylaxis and Swelling   • Losartan Anaphylaxis and Swelling   • Citrullus Vulgaris Unknown - Low Severity   • Eggs Or Egg-Derived Products Unknown - Low Severity       PHYSICAL EXAM:   • Constitutional: Well-developed well-nourished, no acute distress  • Vital signs:   o Height 59\"  o Weight 221  o BMI 44.6 Discussed with patient increased perioperative risks associated with obesity including increased risks of DVT, infection, seromas, poor wound healing and hernias (with abdominal surgery).  • Neck: Supple, no palpable mass, trachea midline  • Respiratory: Clear to auscultation, normal inspiratory effort  • Cardiovascular: Regular rate, no murmur, no carotid bruit  • Gastrointestinal: Soft, nontender  • Psychiatric: Alert and oriented ×3, normal affect       Juvenal Glover PA-C    "

## 2023-02-09 DIAGNOSIS — K21.9 GASTROESOPHAGEAL REFLUX DISEASE WITHOUT ESOPHAGITIS: ICD-10-CM

## 2023-02-09 RX ORDER — PANTOPRAZOLE SODIUM 40 MG/1
TABLET, DELAYED RELEASE ORAL
Qty: 90 TABLET | Refills: 3 | Status: SHIPPED | OUTPATIENT
Start: 2023-02-09

## 2023-02-13 ENCOUNTER — TELEPHONE (OUTPATIENT)
Dept: FAMILY MEDICINE CLINIC | Facility: CLINIC | Age: 63
End: 2023-02-13

## 2023-02-13 DIAGNOSIS — E11.59 TYPE 2 DIABETES MELLITUS WITH OTHER CIRCULATORY COMPLICATION, WITHOUT LONG-TERM CURRENT USE OF INSULIN: ICD-10-CM

## 2023-02-13 DIAGNOSIS — E78.5 HYPERLIPIDEMIA, UNSPECIFIED HYPERLIPIDEMIA TYPE: ICD-10-CM

## 2023-02-13 DIAGNOSIS — I10 PRIMARY HYPERTENSION: Primary | ICD-10-CM

## 2023-02-13 NOTE — TELEPHONE ENCOUNTER
Patient is wanting to know if she can come at 3:30 tomorrow to have her labs done then she'll be more than happy to wait for her appt at 4:15,,, she wants to make sure her labs can be done the same day due to her work schedule

## 2023-02-13 NOTE — TELEPHONE ENCOUNTER
Called patient and told her that is okay and that I am sending this to Dr. Khalil to go ahead and put in the lab work.

## 2023-02-14 ENCOUNTER — OFFICE VISIT (OUTPATIENT)
Dept: FAMILY MEDICINE CLINIC | Facility: CLINIC | Age: 63
End: 2023-02-14

## 2023-02-14 VITALS
HEIGHT: 59 IN | BODY MASS INDEX: 42.94 KG/M2 | WEIGHT: 213 LBS | DIASTOLIC BLOOD PRESSURE: 80 MMHG | HEART RATE: 65 BPM | SYSTOLIC BLOOD PRESSURE: 132 MMHG | OXYGEN SATURATION: 99 % | TEMPERATURE: 97.8 F

## 2023-02-14 DIAGNOSIS — E78.5 HYPERLIPIDEMIA, UNSPECIFIED HYPERLIPIDEMIA TYPE: ICD-10-CM

## 2023-02-14 DIAGNOSIS — I10 ESSENTIAL HYPERTENSION: ICD-10-CM

## 2023-02-14 DIAGNOSIS — E11.59 TYPE 2 DIABETES MELLITUS WITH OTHER CIRCULATORY COMPLICATION, WITHOUT LONG-TERM CURRENT USE OF INSULIN: Primary | ICD-10-CM

## 2023-02-14 PROCEDURE — 99213 OFFICE O/P EST LOW 20 MIN: CPT | Performed by: INTERNAL MEDICINE

## 2023-02-14 RX ORDER — ATORVASTATIN CALCIUM 40 MG/1
40 TABLET, FILM COATED ORAL DAILY
Qty: 90 TABLET | Refills: 3 | Status: SHIPPED | OUTPATIENT
Start: 2023-02-14

## 2023-02-14 RX ORDER — AMLODIPINE BESYLATE 10 MG/1
10 TABLET ORAL DAILY
Qty: 90 TABLET | Refills: 3 | Status: SHIPPED | OUTPATIENT
Start: 2023-02-14

## 2023-03-23 ENCOUNTER — OFFICE VISIT (OUTPATIENT)
Dept: CARDIOLOGY | Facility: CLINIC | Age: 63
End: 2023-03-23

## 2023-03-23 VITALS
HEART RATE: 71 BPM | SYSTOLIC BLOOD PRESSURE: 130 MMHG | DIASTOLIC BLOOD PRESSURE: 80 MMHG | HEIGHT: 59 IN | WEIGHT: 216.2 LBS | OXYGEN SATURATION: 98 % | BODY MASS INDEX: 43.59 KG/M2

## 2023-03-23 DIAGNOSIS — E78.5 HYPERLIPIDEMIA, UNSPECIFIED HYPERLIPIDEMIA TYPE: ICD-10-CM

## 2023-03-23 DIAGNOSIS — E11.59 TYPE 2 DIABETES MELLITUS WITH OTHER CIRCULATORY COMPLICATION, WITHOUT LONG-TERM CURRENT USE OF INSULIN: ICD-10-CM

## 2023-03-23 DIAGNOSIS — I10 PRIMARY HYPERTENSION: ICD-10-CM

## 2023-03-23 DIAGNOSIS — I25.119 CORONARY ARTERY DISEASE INVOLVING NATIVE CORONARY ARTERY OF NATIVE HEART WITH ANGINA PECTORIS: Primary | ICD-10-CM

## 2023-03-23 PROCEDURE — 93000 ELECTROCARDIOGRAM COMPLETE: CPT | Performed by: INTERNAL MEDICINE

## 2023-03-23 PROCEDURE — 99214 OFFICE O/P EST MOD 30 MIN: CPT | Performed by: INTERNAL MEDICINE

## 2023-03-23 NOTE — PROGRESS NOTES
Date of Office Visit: 23    Encounter Provider: Lex Morales MD  Place of Service: [facility]  Patient Name: Kimberli Magallanes  :1960    Chief complaint:  History of coronary artery disease and PCI    HPI:  62-year-old with medical history of coronary artery disease, non-ST elevation MI, essential hypertension, diabetes mellitus, who had a non-ST elevation MI in the [facility] in 2020.  She had a long first diagonal versus ramus with an 80% stenosis that was stented.  She had mild disease in the LAD and the RCA was nondominant.    Since her last visit she ran out of her metoprolol and went into atrial flutter with 2:1 conduction.  She subsequently was given IV metoprolol in the ER and converted back to sinus.  She states that occasionally she will have palpitations but has not had any recurrence of atrial flutter.  She had a Zio patch placed with no evidence of atrial flutter or fibrillation.  Anticoagulant therapy was not started.     She is done very well since that time.  She denies any chest pain or palpitations.  Her blood pressure and heart rate are well controlled.    Past Medical History:   Diagnosis Date   • Abnormal electrocardiogram    • Adverse reaction to ACE inhibitor drug    • Adverse reaction to angiotensin 2 receptor antagonist    • BMI 40.0-44.9, adult (HCC)    • Coronary artery disease 01/15/2020    Stent was placed.   • Diabetes mellitus (HCC)     Diet Controlled   • Encounter for annual health examination    • Encounter for hepatitis C screening test for low risk patient    • Esophageal reflux    • Foot deformity, acquired, right    • Glucosuria    • Hematuria, microscopic    • Hyperlipidemia    • Hypertension    • Refused influenza vaccine    • Visit for screening mammogram        Past Surgical History:   Procedure Laterality Date   • BREAST BIOPSY Left 2019    benign calcification   • CARDIAC CATHETERIZATION      x1  stent LAD at [facility]   • COLONOSCOPY  approx 2016    negative per pt    • CORONARY STENT PLACEMENT  01/15/2020   • ENDOSCOPY      Dr. Martínez       Social History     Socioeconomic History   • Marital status:    Tobacco Use   • Smoking status: Former     Packs/day: 0.25     Years: 10.00     Pack years: 2.50     Types: Cigarettes     Start date: 1979     Quit date: 2012     Years since quittin.2   • Smokeless tobacco: Never   • Tobacco comments:     I quite in , then started agin, then quite but finally stopped in .   Vaping Use   • Vaping Use: Never used   Substance and Sexual Activity   • Alcohol use: Yes     Alcohol/week: 0.0 - 2.0 standard drinks     Comment: social   • Drug use: No   • Sexual activity: Not Currently     Birth control/protection: Post-menopausal       Family History   Problem Relation Age of Onset   • Heart attack Mother 67   • Stroke Mother    • Hypertension Mother    • Heart disease Mother    • Arthritis Mother    • Depression Mother    • Diabetes Father    • Hypertension Father    • Heart disease Father    • Alcohol abuse Father    • Heart attack Maternal Grandmother    • Cancer Maternal Grandmother    • No Known Problems Other    • Breast cancer Neg Hx        Review of Systems   Constitutional: Negative for fever and malaise/fatigue.   HENT: Negative for nosebleeds and sore throat.    Eyes: Negative for blurred vision and double vision.   Cardiovascular: Negative for chest pain, claudication, palpitations and syncope.   Respiratory: Negative for cough, shortness of breath and snoring.    Endocrine: Negative for cold intolerance, heat intolerance and polydipsia.   Skin: Negative for itching, poor wound healing and rash.   Musculoskeletal: Negative for joint pain, joint swelling, muscle weakness and myalgias.   Gastrointestinal: Negative for abdominal pain, melena, nausea and vomiting.   Neurological: Negative for light-headedness, loss of balance,  seizures, vertigo and weakness.   Psychiatric/Behavioral: Negative for altered mental status and depression.       Allergies   Allergen Reactions   • Lisinopril Anaphylaxis and Swelling   • Losartan Anaphylaxis and Swelling   • Citrullus Vulgaris Unknown - Low Severity   • Eggs Or Egg-Derived Products Unknown - Low Severity         Current Outpatient Medications:   •  amLODIPine (NORVASC) 10 MG tablet, Take 1 tablet by mouth Daily., Disp: 90 tablet, Rfl: 3  •  ASPIRIN LOW DOSE 81 MG chewable tablet, Chew 1 tablet Daily., Disp: 90 tablet, Rfl: 0  •  atorvastatin (LIPITOR) 40 MG tablet, Take 1 tablet by mouth Daily., Disp: 90 tablet, Rfl: 3  •  diclofenac (VOLTAREN) 1 % gel gel, Apply 4 g topically to the appropriate area as directed 4 (Four) Times a Day As Needed (pain)., Disp: 150 g, Rfl: 2  •  metFORMIN ER (GLUCOPHAGE-XR) 500 MG 24 hr tablet, Take 2 tablets by mouth 2 (Two) Times a Day., Disp: 360 tablet, Rfl: 1  •  metoprolol tartrate (LOPRESSOR) 25 MG tablet, Take 1 tablet by mouth 2 (Two) Times a Day., Disp: 180 tablet, Rfl: 2  •  metoprolol tartrate (LOPRESSOR) 50 MG tablet, Take 1 tablet by mouth 2 (Two) Times a Day., Disp: 180 tablet, Rfl: 2  •  multivitamin with minerals tablet tablet, Take 1 tablet by mouth Daily., Disp: , Rfl:   •  nitroglycerin (NITROSTAT) 0.4 MG SL tablet, Place 1 tablet under the tongue Every 5 (Five) Minutes As Needed for Chest Pain., Disp: 30 tablet, Rfl: 2  •  pantoprazole (PROTONIX) 40 MG EC tablet, TAKE 1 TABLET DAILY, Disp: 90 tablet, Rfl: 3      Objective:     There were no vitals filed for this visit.  There is no height or weight on file to calculate BMI.    PHYSICAL EXAM:    Constitutional:       General: Not in acute distress.     Appearance: Normal appearance. Well-developed.   Eyes:      Pupils: Pupils are equal, round, and reactive to light.   HENT:      Head: Normocephalic.   Neck:      Vascular: No carotid bruit or JVD.   Pulmonary:      Effort: Pulmonary effort is  normal. No tachypnea.      Breath sounds: Normal breath sounds. No wheezing. No rales.   Cardiovascular:      Normal rate. Regular rhythm.      No gallop.   Pulses:     Intact distal pulses.   Edema:     Pretibial: bilateral 1+ edema of the pretibial area.     Ankle: bilateral 1+ edema of the ankle.  Abdominal:      General: Bowel sounds are normal.      Palpations: Abdomen is soft.      Tenderness: There is no abdominal tenderness.   Musculoskeletal: Normal range of motion.      Cervical back: Normal range of motion and neck supple. No edema. Skin:     General: Skin is warm and dry.   Neurological:      Mental Status: Alert and oriented to person, place, and time.           ECG 12 Lead    Date/Time: 3/23/2023 3:53 PM  Performed by: Lex Morales MD  Authorized by: Lex Morales MD   Comparison: compared with previous ECG from 8/25/2022  Similar to previous ECG  Rhythm: sinus rhythm  Rate: normal  QRS axis: left    Clinical impression: non-specific ECG  Comments: Nonspec. t abnormalities                   Assessment:     Plan:   62-year-old female with a medical history of coronary artery disease, PCI, hypertension, hyperlipidemia, diabetes mellitus and atrial flutter with RVR off of beta-blockade who presents back in follow-up.  This was an isolated episode and she has had no recurrence.  She wore a Zio patch with no evidence of atrial fibrillation or atrial flutter.  She is not on anticoagulant therapy      1.  Paroxysmal atrial flutter: Isolated episode when off of beta-blockade  -Prior Zio patch reviewed.  No atrial fibrillation or flutter.  Have not recommended anticoagulant therapy.  Continue aspirin  -Continue metoprolol tartrate 75 mg p.o. every 12 hours.  She is aware.    2.  Essential hypertension: BP is well controlled today. No changes in her Amlodipine and Metoprolol dosing.     3.  Coronary artery disease with prior PCI.  No angina at this time.  Continue aspirin 81 mg p.o. daily  along with statin therapy.  Tolerating well with no myalgias.  Labs been reviewed.  The Jayrola is well controlled       Your medication list          Accurate as of March 23, 2023  3:11 PM. If you have any questions, ask your nurse or doctor.            CONTINUE taking these medications      Instructions Last Dose Given Next Dose Due   amLODIPine 10 MG tablet  Commonly known as: NORVASC      Take 1 tablet by mouth Daily.       Aspirin Low Dose 81 MG chewable tablet  Generic drug: aspirin      Chew 1 tablet Daily.       atorvastatin 40 MG tablet  Commonly known as: LIPITOR      Take 1 tablet by mouth Daily.       diclofenac 1 % gel gel  Commonly known as: VOLTAREN      Apply 4 g topically to the appropriate area as directed 4 (Four) Times a Day As Needed (pain).       metFORMIN  MG 24 hr tablet  Commonly known as: GLUCOPHAGE-XR      Take 2 tablets by mouth 2 (Two) Times a Day.       metoprolol tartrate 50 MG tablet  Commonly known as: LOPRESSOR      Take 1 tablet by mouth 2 (Two) Times a Day.       metoprolol tartrate 25 MG tablet  Commonly known as: LOPRESSOR      Take 1 tablet by mouth 2 (Two) Times a Day.       multivitamin with minerals tablet tablet      Take 1 tablet by mouth Daily.       nitroglycerin 0.4 MG SL tablet  Commonly known as: NITROSTAT      Place 1 tablet under the tongue Every 5 (Five) Minutes As Needed for Chest Pain.       pantoprazole 40 MG EC tablet  Commonly known as: PROTONIX      TAKE 1 TABLET DAILY

## 2023-05-01 DIAGNOSIS — I10 ESSENTIAL HYPERTENSION: ICD-10-CM

## 2023-05-01 RX ORDER — METOPROLOL TARTRATE 50 MG/1
TABLET, FILM COATED ORAL
Qty: 180 TABLET | Refills: 3 | Status: SHIPPED | OUTPATIENT
Start: 2023-05-01

## 2023-05-16 ENCOUNTER — OFFICE VISIT (OUTPATIENT)
Dept: FAMILY MEDICINE CLINIC | Facility: CLINIC | Age: 63
End: 2023-05-16
Payer: COMMERCIAL

## 2023-05-16 VITALS
HEIGHT: 59 IN | WEIGHT: 217 LBS | SYSTOLIC BLOOD PRESSURE: 132 MMHG | HEART RATE: 77 BPM | DIASTOLIC BLOOD PRESSURE: 82 MMHG | BODY MASS INDEX: 43.75 KG/M2 | OXYGEN SATURATION: 98 %

## 2023-05-16 DIAGNOSIS — K06.8 BLEEDING GUMS: ICD-10-CM

## 2023-05-16 DIAGNOSIS — K05.30 PERIODONTITIS: ICD-10-CM

## 2023-05-16 DIAGNOSIS — K21.9 GASTROESOPHAGEAL REFLUX DISEASE WITHOUT ESOPHAGITIS: Primary | ICD-10-CM

## 2023-05-16 PROBLEM — K44.9 HIATAL HERNIA: Status: ACTIVE | Noted: 2023-05-16

## 2023-05-16 PROCEDURE — 99214 OFFICE O/P EST MOD 30 MIN: CPT | Performed by: INTERNAL MEDICINE

## 2023-05-16 RX ORDER — FAMOTIDINE 20 MG/1
20 TABLET, FILM COATED ORAL 2 TIMES DAILY PRN
Qty: 180 TABLET | Refills: 3 | Status: SHIPPED | OUTPATIENT
Start: 2023-05-16

## 2023-05-16 NOTE — PROGRESS NOTES
Subjective   Kmiberli Magallanes is a 62 y.o. female.     Chief Complaint   Patient presents with   • Bloated   • Gums bleeding   • Heartburn       History of Present Illness   Daughter was present during the history-taking and subsequent discussion (and for part of the physical exam) with this patient.  Patient agrees to the presence of the individual during this visit.    Having issues with the GERD and having increased indigestion and heartburn with twisiting feeling in the upper stomach region.   For the last 10-15 days with puffy gums and oozing from the gums in in the morning.  Has appointment with dentist in 2 days.    History of hypertension.  Currently, has been feeling well and asymptomatic without any headaches,vision changes, cough, chest pain, shortness of breath, swelling, focal neurologic deficit, memory loss or syncope.  Has been taking the medications regularly and adherent with the regimen of amlodipine 10 mg, metoprolol 75 mg BID. Denies medication side effects and no significant interval events.       History of high cholesterol.  Currently, has been feeling well without any myalgias, muscle aches, weakness, numbness, chest pain, short of breath or other issues.  Currently, is adherent with medication regimen of atorvastatin 40 mg and denies medication side effects. Is due for lab follow-up. Last LDL of 75 on  2/14/2023.     History of diabetes.  Patient states to have been compliant with medications and trying to exercise more.  Blood sugar monitoring - patient states has been running on average 115.  With a range of .  No episodes of hypoglycemia, nausea, vomiting, new rashes, syncope or other issues.  Currently, on metformin XR 1000 mg twice a day.  Last A1c 2/14/2023 of 7.0%.     The following portions of the patient's history were reviewed and updated as appropriate: allergies, current medications, past family history, past medical history, past social history, past surgical history and  problem list.    Depression Screen:      4/20/2022     9:40 AM   PHQ-2/PHQ-9 Depression Screening   Little Interest or Pleasure in Doing Things 1-->several days   Feeling Down, Depressed or Hopeless 0-->not at all   Trouble Falling or Staying Asleep, or Sleeping Too Much 0-->not at all   Feeling Tired or Having Little Energy 0-->not at all   Poor Appetite or Overeating 0-->not at all   Feeling Bad about Yourself - or that You are a Failure or Have Let Yourself or Your Family Down 0-->not at all   Trouble Concentrating on Things, Such as Reading the Newspaper or Watching Television 0-->not at all   Moving or Speaking So Slowly that Other People Could Have Noticed? Or the Opposite - Being So Fidgety 0-->not at all   Thoughts that You Would be Better Off Dead or of Hurting Yourself in Some Way 0-->not at all   PHQ-9: Brief Depression Severity Measure Score 1   If You Checked Off Any Problems, How Difficult Have These Problems Made It For You to Do Your Work, Take Care of Things at Home, or Get Along with Other People? not difficult at all       Past Medical History:   Diagnosis Date   • Abnormal electrocardiogram    • Adverse reaction to ACE inhibitor drug    • Adverse reaction to angiotensin 2 receptor antagonist    • BMI 40.0-44.9, adult    • Coronary artery disease 01/15/2020    Stent was placed.   • Diabetes mellitus     Diet Controlled   • Encounter for annual health examination    • Encounter for hepatitis C screening test for low risk patient    • Esophageal reflux    • Foot deformity, acquired, right    • Glucosuria    • Hematuria, microscopic    • Hyperlipidemia    • Hypertension    • Refused influenza vaccine    • Visit for screening mammogram        Past Surgical History:   Procedure Laterality Date   • BREAST BIOPSY Left 02/14/2019    benign calcification   • CARDIAC CATHETERIZATION      x1 stent LAD at [facility]   • COLONOSCOPY  approx 2016    negative per pt    • CORONARY STENT PLACEMENT  01/15/2020    • ENDOSCOPY      Dr. Martínez       Family History   Problem Relation Age of Onset   • Heart attack Mother 67   • Stroke Mother    • Hypertension Mother    • Heart disease Mother    • Arthritis Mother    • Depression Mother    • Diabetes Father    • Hypertension Father    • Heart disease Father    • Alcohol abuse Father    • Heart attack Maternal Grandmother    • Cancer Maternal Grandmother    • No Known Problems Other    • Breast cancer Neg Hx        Social History     Socioeconomic History   • Marital status:    Tobacco Use   • Smoking status: Former     Packs/day: 0.25     Years: 10.00     Pack years: 2.50     Types: Cigarettes     Start date: 1979     Quit date: 2012     Years since quittin.3   • Smokeless tobacco: Never   • Tobacco comments:     I quite in , then started agin, then quite but finally stopped in .   Vaping Use   • Vaping Use: Never used   Substance and Sexual Activity   • Alcohol use: Yes     Alcohol/week: 0.0 - 2.0 standard drinks     Comment: social   • Drug use: No   • Sexual activity: Not Currently     Birth control/protection: Post-menopausal       Current Outpatient Medications   Medication Sig Dispense Refill   • amLODIPine (NORVASC) 10 MG tablet Take 1 tablet by mouth Daily. 90 tablet 3   • ASPIRIN LOW DOSE 81 MG chewable tablet Chew 1 tablet Daily. 90 tablet 0   • atorvastatin (LIPITOR) 40 MG tablet Take 1 tablet by mouth Daily. 90 tablet 3   • diclofenac (VOLTAREN) 1 % gel gel Apply 4 g topically to the appropriate area as directed 4 (Four) Times a Day As Needed (pain). 150 g 2   • metFORMIN ER (GLUCOPHAGE-XR) 500 MG 24 hr tablet Take 2 tablets by mouth 2 (Two) Times a Day. 360 tablet 1   • metoprolol tartrate (LOPRESSOR) 25 MG tablet TAKE 1 TABLET TWICE A  tablet 3   • metoprolol tartrate (LOPRESSOR) 50 MG tablet TAKE 1 TABLET TWICE A  tablet 3   • multivitamin with minerals tablet tablet Take 1 tablet by mouth Daily.     • nitroglycerin  "(NITROSTAT) 0.4 MG SL tablet Place 1 tablet under the tongue Every 5 (Five) Minutes As Needed for Chest Pain. 30 tablet 2   • pantoprazole (PROTONIX) 40 MG EC tablet TAKE 1 TABLET DAILY 90 tablet 3   • famotidine (Pepcid) 20 MG tablet Take 1 tablet by mouth 2 (Two) Times a Day As Needed for Heartburn. 180 tablet 3     No current facility-administered medications for this visit.       Review of Systems   Constitutional: Negative for activity change, appetite change, fatigue, fever, unexpected weight gain and unexpected weight loss.   HENT: Negative for nosebleeds, rhinorrhea, trouble swallowing and voice change.         Gum bleeding   Eyes: Negative for visual disturbance.   Respiratory: Negative for cough, chest tightness, shortness of breath and wheezing.    Cardiovascular: Negative for chest pain, palpitations and leg swelling.   Gastrointestinal: Positive for GERD and indigestion. Negative for abdominal pain, blood in stool, constipation, diarrhea, nausea and vomiting.   Genitourinary: Negative for dysuria, frequency and hematuria.   Musculoskeletal: Negative for arthralgias, back pain and myalgias.   Skin: Negative for rash and wound.   Neurological: Negative for dizziness, tremors, weakness, light-headedness, numbness, headache and memory problem.   Hematological: Negative for adenopathy. Does not bruise/bleed easily.   Psychiatric/Behavioral: Negative for sleep disturbance and depressed mood. The patient is not nervous/anxious.        Objective   /82 (BP Location: Right arm, Patient Position: Sitting, Cuff Size: Large Adult)   Pulse 77   Ht 149.9 cm (59\")   Wt 98.4 kg (217 lb)   LMP  (LMP Unknown)   SpO2 98%   BMI 43.83 kg/m²     Physical Exam  Vitals and nursing note reviewed.   Constitutional:       General: She is not in acute distress.     Appearance: She is well-developed. She is obese. She is not diaphoretic.   HENT:      Head: Normocephalic and atraumatic.      Right Ear: External ear " normal.      Left Ear: External ear normal.      Nose: Nose normal.      Mouth/Throat:      Comments: Examination of the teeth were normal with periodontal swelling redness and some mild tenderness but no bleeding or sores were noted mainly in the upper incisor region posteriorly.  Eyes:      Conjunctiva/sclera: Conjunctivae normal.      Pupils: Pupils are equal, round, and reactive to light.   Neck:      Thyroid: No thyromegaly.      Trachea: No tracheal deviation.   Cardiovascular:      Rate and Rhythm: Normal rate and regular rhythm.      Heart sounds: Normal heart sounds. No murmur heard.    No friction rub. No gallop.   Pulmonary:      Effort: Pulmonary effort is normal. No respiratory distress.      Breath sounds: Normal breath sounds.   Abdominal:      General: Bowel sounds are normal.      Palpations: Abdomen is soft. There is no mass.      Tenderness: There is no abdominal tenderness. There is no guarding.   Musculoskeletal:         General: Normal range of motion.      Cervical back: Normal range of motion and neck supple.   Lymphadenopathy:      Cervical: No cervical adenopathy.   Skin:     General: Skin is warm and dry.      Capillary Refill: Capillary refill takes less than 2 seconds.      Findings: No rash.   Neurological:      Mental Status: She is alert and oriented to person, place, and time.      Motor: No abnormal muscle tone.      Deep Tendon Reflexes: Reflexes normal.   Psychiatric:         Behavior: Behavior normal.         Thought Content: Thought content normal.         Judgment: Judgment normal.         No results found for this or any previous visit (from the past 2016 hour(s)).  Assessment & Plan   Diagnoses and all orders for this visit:    1. Gastroesophageal reflux disease without esophagitis (Primary)  -     famotidine (Pepcid) 20 MG tablet; Take 1 tablet by mouth 2 (Two) Times a Day As Needed for Heartburn.  Dispense: 180 tablet; Refill: 3    2. Bleeding gums  -     CBC &  Differential; Future    3. Periodontitis    I discussed with patient concerning her reflux and upper abdominal discomfort which is likely secondary to the hiatal hernia.  I did asked that she continue with her pantoprazole we will utilize the famotidine twice a day as needed for any breakthrough.  She has worsening problems and will need to follow-up with a gastroenterologist.  Bleeding gums likely periodontitis but must rule out with CBC any platelet dysfunction.  I did asked that she hold her aspirin for the next 10 to 14 days and follow-up with the dentist as scheduled.  A prolonged discussion with the patient and her daughter in the room concerning hiatal hernia and acid reflux periodontitis causes and treatment options.         · COVID-19 Precautions - Patient was compliant in wearing a mask. When I saw the patient, I used appropriate personal protective equipment (PPE) including mask and eye shield (standard procedure).  Additionally, I used gown and gloves if indicated.  Hand hygiene was completed before and after seeing the patient.  · Dictated utilizing Dragon Dictation

## 2023-06-02 ENCOUNTER — TRANSCRIBE ORDERS (OUTPATIENT)
Dept: ADMINISTRATIVE | Facility: HOSPITAL | Age: 63
End: 2023-06-02
Payer: COMMERCIAL

## 2023-06-02 DIAGNOSIS — Z12.31 SCREENING MAMMOGRAM FOR BREAST CANCER: Primary | ICD-10-CM

## 2023-06-12 ENCOUNTER — HOSPITAL ENCOUNTER (OUTPATIENT)
Dept: MAMMOGRAPHY | Facility: HOSPITAL | Age: 63
Discharge: HOME | End: 2023-06-12
Admitting: INTERNAL MEDICINE
Payer: COMMERCIAL

## 2023-06-12 DIAGNOSIS — Z12.31 SCREENING MAMMOGRAM FOR BREAST CANCER: ICD-10-CM

## 2023-06-12 PROCEDURE — 77067 SCR MAMMO BI INCL CAD: CPT

## 2023-06-12 PROCEDURE — 77063 BREAST TOMOSYNTHESIS BI: CPT

## 2023-09-07 ENCOUNTER — OFFICE VISIT (OUTPATIENT)
Dept: OBSTETRICS AND GYNECOLOGY | Age: 63
End: 2023-09-07
Payer: COMMERCIAL

## 2023-09-07 VITALS
BODY MASS INDEX: 43.75 KG/M2 | DIASTOLIC BLOOD PRESSURE: 72 MMHG | WEIGHT: 217 LBS | HEIGHT: 59 IN | SYSTOLIC BLOOD PRESSURE: 124 MMHG

## 2023-09-07 DIAGNOSIS — Z01.419 WELL WOMAN EXAM WITH ROUTINE GYNECOLOGICAL EXAM: Primary | ICD-10-CM

## 2023-09-07 DIAGNOSIS — R14.0 BLOATING: ICD-10-CM

## 2023-09-07 PROCEDURE — 99396 PREV VISIT EST AGE 40-64: CPT | Performed by: PHYSICIAN ASSISTANT

## 2023-09-07 NOTE — PROGRESS NOTES
Subjective     Chief Complaint   Patient presents with    Gynecologic Exam     Annual exam last pap 2021 neg/neg, m/g 2023. [facility] 2016       History of Present Illness    Kimberli Magallanes is a 62 y.o.  who presents for annual exam.    She is ok  Doing better from a cardiac standpoint   Is on her metoporol consistently now  Does routine f/u with her CV doc    Did have an EGD in   that was normal  Is noting bloating and is on pepcid but that is not helping  Will f/u with PCP in regards to this  Would be planning a CT of her gallbladder   Has tried elimination diet but no relief in her sxs  Does feel as if her pepcid is helping a little    No gyn issues  Mammogram utd    Obstetric History:  OB History          2    Para   2    Term   2            AB        Living             SAB        IAB        Ectopic        Molar        Multiple        Live Births                   Menstrual History:     No LMP recorded (lmp unknown). Patient is postmenopausal.         Current contraception: post menopausal status  History of abnormal Pap smear: no  Received Gardasil immunization: no  Perform regular self breast exam: yes - occl  Family history of uterine or ovarian cancer: no  Family History of colon cancer: no  Family history of breast cancer:  no    Mammogram: up to date.  Colonoscopy: up to date./may need another one  DEXA: not indicated.    Exercise: moderately active  Calcium/Vitamin D: adequate intake    The following portions of the patient's history were reviewed and updated as appropriate: allergies, current medications, past family history, past medical history, past social history, past surgical history, and problem list.    Review of Systems    Review of Systems   Constitutional: Negative for fatigue.   Respiratory: Negative for shortness of breath.    Gastrointestinal: Negative for abdominal pain.   Genitourinary: Negative for dysuria.   Neurological: Negative for headaches.  "  Psychiatric/Behavioral: Negative for dysphoric mood.         Objective   Physical Exam    /72   Ht 149.9 cm (59\")   Wt 98.4 kg (217 lb)   LMP  (LMP Unknown)   BMI 43.83 kg/m²   General:   alert, comfortable, and no distress   Heart: regular rate and rhythm   Lungs: clear to auscultation bilaterally   Breast: normal appearance, no masses or tenderness, Inspection negative, No nipple retraction or dimpling, No nipple discharge or bleeding, No axillary or supraclavicular adenopathy, Normal to palpation without dominant masses   Neck: no adenopathy and no carotid bruit   Abdomen: normal findings: soft, non-tender   CVA: Not performed today   Pelvis: External genitalia: normal general appearance  Vaginal: normal mucosa without prolapse or lesions  Cervix: normal appearance  Adnexa: non palpable  Uterus: normal single, nontender  Difficult to clearly palpate d/t habitus   Extremities: Not performed today   Neurologic: negative   Psychiatric: Normal affect, judgement, and mood     Assessment & Plan   Diagnoses and all orders for this visit:    1. Well woman exam with routine gynecological exam (Primary)    2. Bloating        All questions answered.  Breast self exam technique reviewed and patient encouraged to perform self-exam monthly.  Discussed healthy lifestyle modifications.  Recommended 30 minutes of aerobic exercise five times per week.  Discussed calcium needs to prevent osteoporosis.    Pap utd  Mammogram utd  Plan pelvic u/s to eval uterus and ovaries               "

## 2023-09-13 ENCOUNTER — OFFICE VISIT (OUTPATIENT)
Dept: OBSTETRICS AND GYNECOLOGY | Age: 63
End: 2023-09-13
Payer: COMMERCIAL

## 2023-09-13 VITALS
BODY MASS INDEX: 43.55 KG/M2 | WEIGHT: 216 LBS | DIASTOLIC BLOOD PRESSURE: 90 MMHG | SYSTOLIC BLOOD PRESSURE: 142 MMHG | HEIGHT: 59 IN

## 2023-09-13 DIAGNOSIS — R14.0 BLOATING: Primary | ICD-10-CM

## 2023-09-13 DIAGNOSIS — D25.9 UTERINE LEIOMYOMA, UNSPECIFIED LOCATION: ICD-10-CM

## 2023-09-13 PROCEDURE — 99213 OFFICE O/P EST LOW 20 MIN: CPT | Performed by: PHYSICIAN ASSISTANT

## 2023-09-13 NOTE — PROGRESS NOTES
"Subjective     Chief Complaint   Patient presents with    Follow-up     Follow up from 2023 for gyn ultrasound.        Kimberli Magallanes is a 62 y.o.  whose LMP is No LMP recorded (lmp unknown). Patient is postmenopausal. presents for pelvic u/s    I saw Kimberli last week for routine exam  She was noting bloating that is being w/u by her PCP  I suggested a pelvic u/s to r/o ovarian involvement  She is here for her u/s today    Nothing new to report    No Additional Complaints Reported    The following portions of the patient's history were reviewed and updated as appropriate:no additional history reviewed, vital signs, allergies, current medications, past family history, past medical history, past social history, past surgical history, and problem list      Review of Systems   Genitourinary:positive for bloating     Objective      /90   Ht 149.9 cm (59\")   Wt 98 kg (216 lb)   LMP  (LMP Unknown)   BMI 43.63 kg/m²     Physical Exam    General:   alert, comfortable, and no distress   Heart: Not performed today   Lungs: Not performed today.   Breast: Not performed today   Neck: Not performed today   Abdomen: Not performed today   CVA: Not performed today   Pelvis: Not performed today   Extremities: Not performed today   Neurologic: Not performed today   Psychiatric: Normal affect, judgement, and mood       Lab Review   Labs: No data reviewed    Imaging   Ultrasound - Pelvic Vaginal  1.  Uterus: Enlarged, with uterine volume of 137 ml, with uterine dimensions 8 x 6 x 5 cm, and Anteverted     2.  Endometrium: Normal postmenopausal thickness and 3.8 mm      3.  Myometrium: Normal homogenous texture  and Multiple fibroids, 4 in number, ranging in size 1.8 to 2.4 cm      4.  Ovaries             Left: Not visualized              Right: Not visualized        Assessment & Plan     ASSESSMENT  1. Bloating    2. Uterine leiomyoma, unspecified location          PLAN  1. Pelvic u/s is wnl and reassuring. Small " fibroids seen that are of no significance. She is planning to f/u with pcp and is considering dcing her metformin as she feels that is contributing to her sxs. We did discuss healthy eating and exercise and she plans to put this in place. Hoping to get her daughter on board as well. Disc HMR as an option to look into    Follow up: 1 year(s)    ISABEL Sung  9/13/2023

## 2023-11-13 DIAGNOSIS — E11.59 TYPE 2 DIABETES MELLITUS WITH OTHER CIRCULATORY COMPLICATION, WITHOUT LONG-TERM CURRENT USE OF INSULIN: ICD-10-CM

## 2023-11-13 RX ORDER — METFORMIN HYDROCHLORIDE 500 MG/1
1000 TABLET, EXTENDED RELEASE ORAL 2 TIMES DAILY
Qty: 360 TABLET | Refills: 0 | Status: SHIPPED | OUTPATIENT
Start: 2023-11-13

## 2023-11-28 ENCOUNTER — HOSPITAL ENCOUNTER (EMERGENCY)
Facility: HOSPITAL | Age: 63
Discharge: HOME | End: 2023-11-28
Attending: EMERGENCY MEDICINE | Admitting: EMERGENCY MEDICINE
Payer: COMMERCIAL

## 2023-11-28 ENCOUNTER — TELEPHONE (OUTPATIENT)
Dept: FAMILY MEDICINE CLINIC | Facility: CLINIC | Age: 63
End: 2023-11-28
Payer: COMMERCIAL

## 2023-11-28 VITALS
HEIGHT: 58 IN | HEART RATE: 104 BPM | TEMPERATURE: 97.5 F | OXYGEN SATURATION: 93 % | DIASTOLIC BLOOD PRESSURE: 46 MMHG | WEIGHT: 220 LBS | BODY MASS INDEX: 46.18 KG/M2 | RESPIRATION RATE: 18 BRPM | SYSTOLIC BLOOD PRESSURE: 98 MMHG

## 2023-11-28 DIAGNOSIS — R73.9 HYPERGLYCEMIA: Primary | ICD-10-CM

## 2023-11-28 LAB
ALBUMIN SERPL-MCNC: 4.5 G/DL (ref 3.5–5.2)
ALBUMIN/GLOB SERPL: 1.6 G/DL
ALP SERPL-CCNC: 113 U/L (ref 39–117)
ALT SERPL W P-5'-P-CCNC: 21 U/L (ref 1–33)
ANION GAP SERPL CALCULATED.3IONS-SCNC: 15 MMOL/L (ref 5–15)
AST SERPL-CCNC: 19 U/L (ref 1–32)
BASOPHILS # BLD AUTO: 0.07 10*3/MM3 (ref 0–0.2)
BASOPHILS NFR BLD AUTO: 0.9 % (ref 0–1.5)
BILIRUB SERPL-MCNC: 0.6 MG/DL (ref 0–1.2)
BUN SERPL-MCNC: 9 MG/DL (ref 8–23)
BUN/CREAT SERPL: 13 (ref 7–25)
CALCIUM SPEC-SCNC: 9.7 MG/DL (ref 8.6–10.5)
CHLORIDE SERPL-SCNC: 101 MMOL/L (ref 98–107)
CO2 SERPL-SCNC: 19 MMOL/L (ref 22–29)
CREAT SERPL-MCNC: 0.69 MG/DL (ref 0.57–1)
DEPRECATED RDW RBC AUTO: 45.8 FL (ref 37–54)
EGFRCR SERPLBLD CKD-EPI 2021: 98.9 ML/MIN/1.73
EOSINOPHIL # BLD AUTO: 0.15 10*3/MM3 (ref 0–0.4)
EOSINOPHIL NFR BLD AUTO: 1.9 % (ref 0.3–6.2)
ERYTHROCYTE [DISTWIDTH] IN BLOOD BY AUTOMATED COUNT: 14.4 % (ref 12.3–15.4)
GLOBULIN UR ELPH-MCNC: 2.9 GM/DL
GLUCOSE BLDC GLUCOMTR-MCNC: 208 MG/DL (ref 70–130)
GLUCOSE BLDC GLUCOMTR-MCNC: 338 MG/DL (ref 70–130)
GLUCOSE BLDC GLUCOMTR-MCNC: 340 MG/DL (ref 70–130)
GLUCOSE SERPL-MCNC: 362 MG/DL (ref 65–99)
HCT VFR BLD AUTO: 41.6 % (ref 34–46.6)
HGB BLD-MCNC: 13.7 G/DL (ref 12–15.9)
IMM GRANULOCYTES # BLD AUTO: 0.02 10*3/MM3 (ref 0–0.05)
IMM GRANULOCYTES NFR BLD AUTO: 0.2 % (ref 0–0.5)
LYMPHOCYTES # BLD AUTO: 1.96 10*3/MM3 (ref 0.7–3.1)
LYMPHOCYTES NFR BLD AUTO: 24.4 % (ref 19.6–45.3)
MAGNESIUM SERPL-MCNC: 1.4 MG/DL (ref 1.6–2.4)
MCH RBC QN AUTO: 28.7 PG (ref 26.6–33)
MCHC RBC AUTO-ENTMCNC: 32.9 G/DL (ref 31.5–35.7)
MCV RBC AUTO: 87.2 FL (ref 79–97)
MONOCYTES # BLD AUTO: 0.65 10*3/MM3 (ref 0.1–0.9)
MONOCYTES NFR BLD AUTO: 8.1 % (ref 5–12)
NEUTROPHILS NFR BLD AUTO: 5.17 10*3/MM3 (ref 1.7–7)
NEUTROPHILS NFR BLD AUTO: 64.5 % (ref 42.7–76)
NRBC BLD AUTO-RTO: 0 /100 WBC (ref 0–0.2)
PLATELET # BLD AUTO: 250 10*3/MM3 (ref 140–450)
PMV BLD AUTO: 10.5 FL (ref 6–12)
POTASSIUM SERPL-SCNC: 3.4 MMOL/L (ref 3.5–5.2)
PROT SERPL-MCNC: 7.4 G/DL (ref 6–8.5)
RBC # BLD AUTO: 4.77 10*6/MM3 (ref 3.77–5.28)
SODIUM SERPL-SCNC: 135 MMOL/L (ref 136–145)
WBC NRBC COR # BLD AUTO: 8.02 10*3/MM3 (ref 3.4–10.8)

## 2023-11-28 PROCEDURE — 82948 REAGENT STRIP/BLOOD GLUCOSE: CPT

## 2023-11-28 PROCEDURE — 83735 ASSAY OF MAGNESIUM: CPT | Performed by: EMERGENCY MEDICINE

## 2023-11-28 PROCEDURE — 36415 COLL VENOUS BLD VENIPUNCTURE: CPT

## 2023-11-28 PROCEDURE — 99283 EMERGENCY DEPT VISIT LOW MDM: CPT

## 2023-11-28 PROCEDURE — 25810000003 SODIUM CHLORIDE 0.9 % SOLUTION: Performed by: EMERGENCY MEDICINE

## 2023-11-28 PROCEDURE — 80053 COMPREHEN METABOLIC PANEL: CPT | Performed by: EMERGENCY MEDICINE

## 2023-11-28 PROCEDURE — 63710000001 INSULIN REGULAR HUMAN PER 5 UNITS: Performed by: EMERGENCY MEDICINE

## 2023-11-28 PROCEDURE — 25810000003 LACTATED RINGERS SOLUTION: Performed by: EMERGENCY MEDICINE

## 2023-11-28 PROCEDURE — 85025 COMPLETE CBC W/AUTO DIFF WBC: CPT | Performed by: EMERGENCY MEDICINE

## 2023-11-28 RX ORDER — POTASSIUM CHLORIDE 750 MG/1
40 TABLET, FILM COATED, EXTENDED RELEASE ORAL ONCE
Status: COMPLETED | OUTPATIENT
Start: 2023-11-28 | End: 2023-11-28

## 2023-11-28 RX ADMIN — INSULIN HUMAN 5 UNITS: 100 INJECTION, SOLUTION PARENTERAL at 16:18

## 2023-11-28 RX ADMIN — POTASSIUM CHLORIDE 40 MEQ: 750 TABLET, EXTENDED RELEASE ORAL at 15:56

## 2023-11-28 RX ADMIN — SODIUM CHLORIDE 1000 ML: 9 INJECTION, SOLUTION INTRAVENOUS at 14:43

## 2023-11-28 RX ADMIN — SODIUM CHLORIDE, POTASSIUM CHLORIDE, SODIUM LACTATE AND CALCIUM CHLORIDE 500 ML: 600; 310; 30; 20 INJECTION, SOLUTION INTRAVENOUS at 16:18

## 2023-11-28 NOTE — ED PROVIDER NOTES
EMERGENCY DEPARTMENT ENCOUNTER    Room Number:  11/11  PCP: Brandin Khalil MD  Historian: Patient      HPI:  Chief Complaint: Hyperglycemia  A complete HPI/ROS/PMH/PSH/SH/FH are unobtainable due to: None  Context: Kimberli Magallanes is a 62 y.o. female who presents to the ED c/o hyperglycemia.  Patient has history of hyperglycemia and takes metformin twice daily.  Patient states she has dental infection has been on antibiotics.  States she is following up with dentist tomorrow.  Patient states her blood sugars have been elevated.  Patient is had no vomiting or diarrhea.  No chest pain or shortness of breath.  No burning with urination.  Patient states her blood sugars are normally below 200.            PAST MEDICAL HISTORY  Active Ambulatory Problems     Diagnosis Date Noted    Type 2 diabetes mellitus     Hypertension     Hyperlipidemia     Esophageal reflux     Visit for screening mammogram 10/02/2019    NSTEMI (non-ST elevated myocardial infarction) 01/15/2020    S/P PTCA (percutaneous transluminal coronary angioplasty) 01/16/2020    Atypical chest pain 01/15/2020    Hospital discharge follow-up 01/21/2020    Anxiety 04/07/2020    Coronary artery disease involving native coronary artery of native heart with angina pectoris 04/07/2020    Xyphoidalgia 10/06/2020    Abdominal bloating 06/08/2021    Encounter for annual health examination     Trigger middle finger of right hand 04/20/2022    BMI 40.0-44.9, adult     Hiatal hernia 05/16/2023     Resolved Ambulatory Problems     Diagnosis Date Noted    No Resolved Ambulatory Problems     Past Medical History:   Diagnosis Date    Abnormal electrocardiogram     Adverse reaction to ACE inhibitor drug     Adverse reaction to angiotensin 2 receptor antagonist     Coronary artery disease 01/15/2020    Diabetes mellitus     Encounter for hepatitis C screening test for low risk patient     Foot deformity, acquired, right     Glucosuria     Hematuria, microscopic     Refused  influenza vaccine          PAST SURGICAL HISTORY  Past Surgical History:   Procedure Laterality Date    BREAST BIOPSY Left 2019    benign calcification    CARDIAC CATHETERIZATION      x1 stent LAD at [facility]    COLONOSCOPY  approx 2016    negative per pt     CORONARY STENT PLACEMENT  01/15/2020    ENDOSCOPY      Dr. Martínez         FAMILY HISTORY  Family History   Problem Relation Age of Onset    Heart attack Mother 67    Stroke Mother     Hypertension Mother     Heart disease Mother 67    Arthritis Mother     Depression Mother     Diabetes Father     Hypertension Father     Heart disease Father 58    Alcohol abuse Father     Heart attack Maternal Grandmother     Cancer Maternal Grandmother     No Known Problems Other     Breast cancer Neg Hx          SOCIAL HISTORY  Social History     Socioeconomic History    Marital status:    Tobacco Use    Smoking status: Former     Packs/day: 0.25     Years: 10.00     Additional pack years: 0.00     Total pack years: 2.50     Types: Cigarettes     Start date: 1979     Quit date: 2012     Years since quittin.9     Passive exposure: Past    Smokeless tobacco: Never    Tobacco comments:     I quite in , then started agin, then quite but finally stopped in .   Vaping Use    Vaping Use: Never used   Substance and Sexual Activity    Alcohol use: Yes     Alcohol/week: 0.0 - 2.0 standard drinks of alcohol     Comment: social    Drug use: No    Sexual activity: Not Currently     Birth control/protection: Post-menopausal         ALLERGIES  Lisinopril, Losartan, Citrullus vulgaris, and Eggs or egg-derived products        REVIEW OF SYSTEMS  Review of Systems   Polyuria polydipsia      PHYSICAL EXAM  ED Triage Vitals   Temp Heart Rate Resp BP SpO2   23 1333 23 1333 23 1333 23 1336 23 1333   97.5 °F (36.4 °C) 104 18 (!) 173/105 93 %      Temp src Heart Rate Source Patient Position BP Location FiO2 (%)   -- -- -- --  --              Physical Exam      GENERAL: no acute distress  HENT: nares patent  EYES: no scleral icterus  CV: regular rhythm, normal rate  RESPIRATORY: normal effort  ABDOMEN: soft  MUSCULOSKELETAL: no deformity  NEURO: alert, moves all extremities, follows commands  PSYCH:  calm, cooperative  SKIN: warm, dry    Vital signs and nursing notes reviewed.          LAB RESULTS  Recent Results (from the past 24 hour(s))   POC Glucose Once    Collection Time: 11/28/23  1:34 PM    Specimen: Blood   Result Value Ref Range    Glucose 340 (H) 70 - 130 mg/dL   Comprehensive Metabolic Panel    Collection Time: 11/28/23  2:28 PM    Specimen: Blood   Result Value Ref Range    Glucose 362 (H) 65 - 99 mg/dL    BUN 9 8 - 23 mg/dL    Creatinine 0.69 0.57 - 1.00 mg/dL    Sodium 135 (L) 136 - 145 mmol/L    Potassium 3.4 (L) 3.5 - 5.2 mmol/L    Chloride 101 98 - 107 mmol/L    CO2 19.0 (L) 22.0 - 29.0 mmol/L    Calcium 9.7 8.6 - 10.5 mg/dL    Total Protein 7.4 6.0 - 8.5 g/dL    Albumin 4.5 3.5 - 5.2 g/dL    ALT (SGPT) 21 1 - 33 U/L    AST (SGOT) 19 1 - 32 U/L    Alkaline Phosphatase 113 39 - 117 U/L    Total Bilirubin 0.6 0.0 - 1.2 mg/dL    Globulin 2.9 gm/dL    A/G Ratio 1.6 g/dL    BUN/Creatinine Ratio 13.0 7.0 - 25.0    Anion Gap 15.0 5.0 - 15.0 mmol/L    eGFR 98.9 >60.0 mL/min/1.73   Magnesium    Collection Time: 11/28/23  2:28 PM    Specimen: Blood   Result Value Ref Range    Magnesium 1.4 (L) 1.6 - 2.4 mg/dL   CBC Auto Differential    Collection Time: 11/28/23  2:28 PM    Specimen: Blood   Result Value Ref Range    WBC 8.02 3.40 - 10.80 10*3/mm3    RBC 4.77 3.77 - 5.28 10*6/mm3    Hemoglobin 13.7 12.0 - 15.9 g/dL    Hematocrit 41.6 34.0 - 46.6 %    MCV 87.2 79.0 - 97.0 fL    MCH 28.7 26.6 - 33.0 pg    MCHC 32.9 31.5 - 35.7 g/dL    RDW 14.4 12.3 - 15.4 %    RDW-SD 45.8 37.0 - 54.0 fl    MPV 10.5 6.0 - 12.0 fL    Platelets 250 140 - 450 10*3/mm3    Neutrophil % 64.5 42.7 - 76.0 %    Lymphocyte % 24.4 19.6 - 45.3 %    Monocyte % 8.1  5.0 - 12.0 %    Eosinophil % 1.9 0.3 - 6.2 %    Basophil % 0.9 0.0 - 1.5 %    Immature Grans % 0.2 0.0 - 0.5 %    Neutrophils, Absolute 5.17 1.70 - 7.00 10*3/mm3    Lymphocytes, Absolute 1.96 0.70 - 3.10 10*3/mm3    Monocytes, Absolute 0.65 0.10 - 0.90 10*3/mm3    Eosinophils, Absolute 0.15 0.00 - 0.40 10*3/mm3    Basophils, Absolute 0.07 0.00 - 0.20 10*3/mm3    Immature Grans, Absolute 0.02 0.00 - 0.05 10*3/mm3    nRBC 0.0 0.0 - 0.2 /100 WBC   POC Glucose Once    Collection Time: 11/28/23  4:09 PM    Specimen: Blood   Result Value Ref Range    Glucose 338 (H) 70 - 130 mg/dL   POC Glucose Once    Collection Time: 11/28/23  5:16 PM    Specimen: Blood   Result Value Ref Range    Glucose 208 (H) 70 - 130 mg/dL       Ordered the above labs and reviewed the results.        RADIOLOGY  No Radiology Exams Resulted Within Past 24 Hours            PROCEDURES  Procedures          MEDICATIONS GIVEN IN ER  Medications   sodium chloride 0.9 % bolus 1,000 mL (0 mL Intravenous Stopped 11/28/23 1620)   potassium chloride (K-DUR,KLOR-CON) ER tablet 40 mEq (40 mEq Oral Given 11/28/23 1556)   insulin regular (humuLIN R,novoLIN R) injection 5 Units (5 Units Intravenous Given 11/28/23 1618)   lactated ringers bolus 500 mL (0 mL Intravenous Stopped 11/28/23 1745)                   MEDICAL DECISION MAKING, PROGRESS, and CONSULTS     Discussion below represents my analysis of pertinent findings related to patient's condition, differential diagnosis, treatment plan and final disposition.      Additional sources:  - Discussed/ obtained information from independent historians: None    - External (non-ED) record review: Epic reviewed and patient seen by OB/GYN 9/7/2023 for bloating    - Chronic or social conditions impacting care: None    - Shared decision making: None      Orders placed during this visit:  Orders Placed This Encounter   Procedures    Comprehensive Metabolic Panel    Magnesium    CBC Auto Differential    POC Glucose Once    POC  Glucose Once    POC Glucose Once    CBC & Differential         Additional orders considered but not ordered:  None        Differential diagnosis includes but is not limited to:    Hyperglycemia, DKA      Independent interpretation of labs, radiology studies, and discussions with consultants:  ED Course as of 11/28/23 1749   Tue Nov 28, 2023   1445 First look: Patient comes in today with concerns for hyperglycemia, recently diagnosed with a dental infection and has been started on antibiotics.  Has been running in the 300-400 range recently, which is unusually high for her.  Takes metformin at home, not on insulin chronically at home.  Has chronic loose stools but no vomiting or diarrhea, no recent illnesses otherwise.  Scheduled to go back to the dentist tomorrow for reevaluation.  Plan for labs, IV fluids, and recheck of blood glucose after IV fluids. [DC]   1724 17:25 EST  Patient given IV fluids here and small amount of insulin.  Patient's blood glucose 208.  Patient is on metformin.  Is being treated for dental infection.  Sugars make it better after the dental infection gets better.  She has an appointment with her dentist tomorrow.  She also needs to contact her primary doctor tomorrow if patient is to be started on new medication.  Instructed to return here for any concerns. [SL]      ED Course User Index  [DC] Kevin Armando MD  [SL] Fly Goldberg MD                 DIAGNOSIS  Final diagnoses:   Hyperglycemia         DISPOSITION  DISCHARGE    Patient discharged in stable condition.    Reviewed implications of results, diagnosis, meds, responsibility to follow up, warning signs and symptoms of possible worsening, potential complications and reasons to return to ER, including worsening symptoms    Patient/Family voiced understanding of above instructions.    Discussed plan for discharge, as there is no emergent indication for admission. Patient referred to primary care provider for BP management due to  today's BP. Pt/family is agreeable and understands need for follow up and repeat testing.  Pt is aware that discharge does not mean that nothing is wrong but it indicates no emergency is present that requires admission and they must continue care with follow-up as given below or physician of their choice.     FOLLOW-UP  Brandin Khalil MD  [id number] [facility]  [address]  [facility] [address]  [phone]    Call in 1 day           Medication List      No changes were made to your prescriptions during this visit.                  Latest Documented Vital Signs:  As of 17:49 EST  BP- 98/46 HR- 104 Temp- 97.5 °F (36.4 °C) O2 sat- 93%              --    Please note that portions of this were completed with a voice recognition program.       Note Disclaimer: At [facility], we believe that sharing information builds trust and better relationships. You are receiving this note because you are receiving care at [facility] or recently visited. It is possible you will see health information before a provider has talked with you about it. This kind of information can be easy to misunderstand. To help you fully understand what it means for your health, we urge you to discuss this note with your provider.            Fly Goldberg MD  11/28/23 [id number]

## 2023-11-28 NOTE — ED NOTES
Patient to ER via car from home recently dx with dental infection states she was put on abx and now her blood sugar is high  Patient takes metformin

## 2023-12-07 ENCOUNTER — OFFICE VISIT (OUTPATIENT)
Dept: FAMILY MEDICINE CLINIC | Facility: CLINIC | Age: 63
End: 2023-12-07
Payer: COMMERCIAL

## 2023-12-07 VITALS
RESPIRATION RATE: 16 BRPM | DIASTOLIC BLOOD PRESSURE: 92 MMHG | BODY MASS INDEX: 46.81 KG/M2 | HEIGHT: 58 IN | SYSTOLIC BLOOD PRESSURE: 160 MMHG | HEART RATE: 80 BPM | OXYGEN SATURATION: 99 % | WEIGHT: 223 LBS

## 2023-12-07 DIAGNOSIS — E11.59 TYPE 2 DIABETES MELLITUS WITH OTHER CIRCULATORY COMPLICATION, WITHOUT LONG-TERM CURRENT USE OF INSULIN: Primary | ICD-10-CM

## 2023-12-07 PROCEDURE — 99214 OFFICE O/P EST MOD 30 MIN: CPT | Performed by: FAMILY MEDICINE

## 2023-12-07 RX ORDER — ASPIRIN 81 MG/1
81 TABLET ORAL DAILY
Start: 2023-12-07

## 2023-12-07 RX ORDER — AMOXICILLIN 500 MG/1
CAPSULE ORAL
COMMUNITY
Start: 2023-11-27

## 2023-12-07 RX ORDER — GLIPIZIDE 5 MG/1
5 TABLET, FILM COATED, EXTENDED RELEASE ORAL DAILY
Qty: 180 TABLET | Refills: 3 | Status: SHIPPED | OUTPATIENT
Start: 2023-12-07

## 2023-12-07 RX ORDER — PIOGLITAZONEHYDROCHLORIDE 15 MG/1
15 TABLET ORAL DAILY
Qty: 90 TABLET | Refills: 3 | Status: SHIPPED | OUTPATIENT
Start: 2023-12-07

## 2023-12-07 NOTE — PROGRESS NOTES
Chief Complaint   Patient presents with    Follow-up     On Blood sugar from the ED.        Subjective   Kimberli Magallanes is a 62 y.o. female.     History of Present Illness   F/U DM2.  BS running on average 378 from last 30 days.  Lowest was in 260s.    The following portions of the patient's history were reviewed and updated as appropriate: allergies, current medications, past family history, past medical history, past social history, past surgical history and problem list.    Review of Systems   Constitutional:  Negative for appetite change and fatigue.   HENT:  Negative for nosebleeds and sore throat.    Eyes:  Negative for blurred vision and visual disturbance.   Respiratory:  Negative for shortness of breath and wheezing.    Cardiovascular:  Negative for chest pain and leg swelling.   Gastrointestinal:  Negative for abdominal distention and abdominal pain.   Endocrine: Negative for cold intolerance and polyuria.   Genitourinary:  Negative for dysuria and hematuria.   Musculoskeletal:  Negative for arthralgias and myalgias.   Skin:  Negative for color change and rash.   Neurological:  Negative for weakness and confusion.   Psychiatric/Behavioral:  Negative for agitation and depressed mood.        Patient Active Problem List   Diagnosis    Type 2 diabetes mellitus    Hypertension    Hyperlipidemia    Esophageal reflux    Visit for screening mammogram    NSTEMI (non-ST elevated myocardial infarction)    S/P PTCA (percutaneous transluminal coronary angioplasty)    Atypical chest pain    Hospital discharge follow-up    Anxiety    Coronary artery disease involving native coronary artery of native heart with angina pectoris    Xyphoidalgia    Abdominal bloating    Encounter for annual health examination    Trigger middle finger of right hand    BMI 40.0-44.9, adult    Hiatal hernia       Allergies   Allergen Reactions    Lisinopril Anaphylaxis and Swelling    Losartan Anaphylaxis and Swelling    Citrullus Vulgaris Unknown  - Low Severity    Eggs Or Egg-Derived Products Unknown - Low Severity         Current Outpatient Medications:     amLODIPine (NORVASC) 10 MG tablet, Take 1 tablet by mouth Daily., Disp: 90 tablet, Rfl: 3    amoxicillin (AMOXIL) 500 MG capsule, TAKE 1 CAPSULE BY MOUTH 4 TIMES DAILY UNTIL GONE, Disp: , Rfl:     atorvastatin (LIPITOR) 40 MG tablet, Take 1 tablet by mouth Daily., Disp: 90 tablet, Rfl: 3    diclofenac (VOLTAREN) 1 % gel gel, Apply 4 g topically to the appropriate area as directed 4 (Four) Times a Day As Needed (pain)., Disp: 150 g, Rfl: 2    famotidine (Pepcid) 20 MG tablet, Take 1 tablet by mouth 2 (Two) Times a Day As Needed for Heartburn., Disp: 180 tablet, Rfl: 3    metFORMIN ER (GLUCOPHAGE-XR) 500 MG 24 hr tablet, Take 2 tablets by mouth 2 (Two) Times a Day., Disp: 360 tablet, Rfl: 0    metoprolol tartrate (LOPRESSOR) 25 MG tablet, TAKE 1 TABLET TWICE A DAY, Disp: 180 tablet, Rfl: 3    metoprolol tartrate (LOPRESSOR) 50 MG tablet, TAKE 1 TABLET TWICE A DAY, Disp: 180 tablet, Rfl: 3    multivitamin with minerals tablet tablet, Take 1 tablet by mouth Daily., Disp: , Rfl:     nitroglycerin (NITROSTAT) 0.4 MG SL tablet, Place 1 tablet under the tongue Every 5 (Five) Minutes As Needed for Chest Pain., Disp: 30 tablet, Rfl: 2    pantoprazole (PROTONIX) 40 MG EC tablet, TAKE 1 TABLET DAILY, Disp: 90 tablet, Rfl: 3    aspirin 81 MG EC tablet, Take 1 tablet by mouth Daily., Disp: , Rfl:     glipizide (GLUCOTROL XL) 5 MG ER tablet, Take 1 tablet by mouth Daily., Disp: 180 tablet, Rfl: 3    pioglitazone (Actos) 15 MG tablet, Take 1 tablet by mouth Daily., Disp: 90 tablet, Rfl: 3    Past Medical History:   Diagnosis Date    Abnormal electrocardiogram     Adverse reaction to ACE inhibitor drug     Adverse reaction to angiotensin 2 receptor antagonist     BMI 40.0-44.9, adult     Coronary artery disease 01/15/2020    Stent was placed.    Diabetes mellitus     Diet Controlled    Encounter for annual health  examination     Encounter for hepatitis C screening test for low risk patient     Esophageal reflux     Foot deformity, acquired, right     Glucosuria     Hematuria, microscopic     Hyperlipidemia     Hypertension     Refused influenza vaccine     Visit for screening mammogram        Past Surgical History:   Procedure Laterality Date    BREAST BIOPSY Left 2019    benign calcification    CARDIAC CATHETERIZATION      x1 stent LAD at [facility]    COLONOSCOPY  approx 2016    negative per pt     CORONARY STENT PLACEMENT  01/15/2020    ENDOSCOPY      Dr. Martínez       Family History   Problem Relation Age of Onset    Heart attack Mother 67    Stroke Mother     Hypertension Mother     Heart disease Mother 67    Arthritis Mother     Depression Mother     Diabetes Father     Hypertension Father     Heart disease Father 58    Alcohol abuse Father     Heart attack Maternal Grandmother     Cancer Maternal Grandmother     No Known Problems Other     Breast cancer Neg Hx        Social History     Tobacco Use    Smoking status: Former     Packs/day: 0.25     Years: 10.00     Additional pack years: 0.00     Total pack years: 2.50     Types: Cigarettes     Start date: 1979     Quit date: 2012     Years since quittin.9     Passive exposure: Past    Smokeless tobacco: Never    Tobacco comments:     I quite in , then started agin, then quite but finally stopped in .   Substance Use Topics    Alcohol use: Yes     Alcohol/week: 0.0 - 2.0 standard drinks of alcohol     Comment: social            Objective     Vitals:    23 1539   BP: 160/92   Pulse: 80   Resp: 16   SpO2: 99%     Body mass index is 46.62 kg/m².    Physical Exam  Vitals reviewed.   Constitutional:       Appearance: She is well-developed. She is not diaphoretic.   HENT:      Head: Normocephalic and atraumatic.   Eyes:      General: No scleral icterus.     Pupils: Pupils are equal, round, and reactive to light.   Neck:      Thyroid:  No thyromegaly.   Cardiovascular:      Rate and Rhythm: Normal rate and regular rhythm.      Heart sounds: No murmur heard.     No friction rub. No gallop.   Pulmonary:      Effort: Pulmonary effort is normal. No respiratory distress.      Breath sounds: No wheezing or rales.   Chest:      Chest wall: No tenderness.   Abdominal:      General: Bowel sounds are normal. There is no distension.      Palpations: Abdomen is soft.      Tenderness: There is no abdominal tenderness.   Musculoskeletal:         General: No deformity. Normal range of motion.   Lymphadenopathy:      Cervical: No cervical adenopathy.   Skin:     General: Skin is warm and dry.      Findings: No rash.   Neurological:      Cranial Nerves: No cranial nerve deficit.      Motor: No abnormal muscle tone.         Lab Results   Component Value Date    GLUCOSE 362 (H) 11/28/2023    BUN 9 11/28/2023    CREATININE 0.69 11/28/2023    EGFRIFNONA 79 09/28/2021    EGFRIFAFRI 91 09/28/2021    BCR 13.0 11/28/2023    K 3.4 (L) 11/28/2023    CO2 19.0 (L) 11/28/2023    CALCIUM 9.7 11/28/2023    PROTENTOTREF 7.5 02/14/2023    ALBUMIN 4.5 11/28/2023    LABIL2 1.9 02/14/2023    AST 19 11/28/2023    ALT 21 11/28/2023       WBC   Date Value Ref Range Status   11/28/2023 8.02 3.40 - 10.80 10*3/mm3 Final   07/21/2020 8.4 3.4 - 10.8 x10E3/uL Final   01/16/2020 11.15 (H) 4.5 - 11.0 10*3/uL Final     RBC   Date Value Ref Range Status   11/28/2023 4.77 3.77 - 5.28 10*6/mm3 Final   07/21/2020 4.73 3.77 - 5.28 x10E6/uL Final   01/16/2020 5.05 4.0 - 5.2 10*6/uL Final     Hemoglobin   Date Value Ref Range Status   11/28/2023 13.7 12.0 - 15.9 g/dL Final   01/16/2020 14.9 12.0 - 16.0 g/dL Final     Hematocrit   Date Value Ref Range Status   11/28/2023 41.6 34.0 - 46.6 % Final   01/16/2020 45.8 36.0 - 46.0 % Final     MCV   Date Value Ref Range Status   11/28/2023 87.2 79.0 - 97.0 fL Final   01/16/2020 90.7 80.0 - 100.0 fL Final     MCH   Date Value Ref Range Status   11/28/2023 28.7  26.6 - 33.0 pg Final   01/16/2020 29.5 26.0 - 34.0 pg Final     MCHC   Date Value Ref Range Status   11/28/2023 32.9 31.5 - 35.7 g/dL Final   01/16/2020 32.5 31.0 - 37.0 g/dL Final     RDW   Date Value Ref Range Status   11/28/2023 14.4 12.3 - 15.4 % Final   01/16/2020 15.1 12.0 - 16.8 % Final     RDW-SD   Date Value Ref Range Status   11/28/2023 45.8 37.0 - 54.0 fl Final     MPV   Date Value Ref Range Status   11/28/2023 10.5 6.0 - 12.0 fL Final   01/16/2020 11.6 (H) 6.7 - 10.8 fL Final     Platelets   Date Value Ref Range Status   11/28/2023 250 140 - 450 10*3/mm3 Final   01/16/2020 220 140 - 440 10*3/uL Final     Neutrophil Rel %   Date Value Ref Range Status   01/16/2020 66.5 45 - 80 % Final     Neutrophil %   Date Value Ref Range Status   11/28/2023 64.5 42.7 - 76.0 % Final     Lymphocyte Rel %   Date Value Ref Range Status   01/16/2020 25.5 15 - 50 % Final     Lymphocyte %   Date Value Ref Range Status   11/28/2023 24.4 19.6 - 45.3 % Final     Monocyte Rel %   Date Value Ref Range Status   01/16/2020 6.7 0 - 15 % Final     Monocyte %   Date Value Ref Range Status   11/28/2023 8.1 5.0 - 12.0 % Final     Eosinophil %   Date Value Ref Range Status   11/28/2023 1.9 0.3 - 6.2 % Final   01/16/2020 0.8 0 - 7 % Final     Basophil Rel %   Date Value Ref Range Status   01/16/2020 0.3 0 - 2 % Final     Basophil %   Date Value Ref Range Status   11/28/2023 0.9 0.0 - 1.5 % Final     Immature Grans %   Date Value Ref Range Status   11/28/2023 0.2 0.0 - 0.5 % Final   01/16/2020 0.2 (H) 0 % Final     Neutrophils Absolute   Date Value Ref Range Status   01/16/2020 7.42 2.0 - 8.8 10*3/uL Final     Neutrophils, Absolute   Date Value Ref Range Status   11/28/2023 5.17 1.70 - 7.00 10*3/mm3 Final     Lymphocytes Absolute   Date Value Ref Range Status   01/16/2020 2.84 0.7 - 5.5 10*3/uL Final     Lymphocytes, Absolute   Date Value Ref Range Status   11/28/2023 1.96 0.70 - 3.10 10*3/mm3 Final     Monocytes Absolute   Date Value Ref  "Range Status   01/16/2020 0.75 0.0 - 1.7 10*3/uL Final     Monocytes, Absolute   Date Value Ref Range Status   11/28/2023 0.65 0.10 - 0.90 10*3/mm3 Final     Eosinophils Absolute   Date Value Ref Range Status   01/16/2020 0.09 0.0 - 0.8 10*3/uL Final     Eosinophils, Absolute   Date Value Ref Range Status   11/28/2023 0.15 0.00 - 0.40 10*3/mm3 Final     Basophils Absolute   Date Value Ref Range Status   01/16/2020 0.03 0.0 - 0.2 10*3/uL Final     Basophils, Absolute   Date Value Ref Range Status   11/28/2023 0.07 0.00 - 0.20 10*3/mm3 Final     Immature Grans, Absolute   Date Value Ref Range Status   11/28/2023 0.02 0.00 - 0.05 10*3/mm3 Final   01/16/2020 0.02 <1 10*3/uL Final     nRBC   Date Value Ref Range Status   11/28/2023 0.0 0.0 - 0.2 /100 WBC Final       Lab Results   Component Value Date    HGBA1C 7.00 (H) 02/14/2023       No results found for: \"ZAMRPULC42\"    No results found for: \"TSH\"    No results found for: \"CHOL\"  Lab Results   Component Value Date    TRIG 103 02/14/2023     Lab Results   Component Value Date    HDL 71 (H) 02/14/2023     Lab Results   Component Value Date    LDL 73 02/14/2023     Lab Results   Component Value Date    VLDL 18 02/14/2023     No results found for: \"LDLHDL\"      Procedures    Assessment & Plan   Problems Addressed this Visit       Type 2 diabetes mellitus - Primary    Relevant Medications    glipizide (GLUCOTROL XL) 5 MG ER tablet    aspirin 81 MG EC tablet    pioglitazone (Actos) 15 MG tablet    Other Relevant Orders    Hemoglobin A1c    Basic Metabolic Panel     Diagnoses         Codes Comments    Type 2 diabetes mellitus with other circulatory complication, without long-term current use of insulin    -  Primary ICD-10-CM: E11.59  ICD-9-CM: 250.70           Dm2  uncontrolled. Chronic.   Check A1c.  Continue metformin.  Start glipizide 5 BID and actos 15 a day.  Check A1c.    Orders Placed This Encounter   Procedures    Hemoglobin A1c     Order Specific Question:   " Release to patient     Answer:   Routine Release [[id number]]    Basic Metabolic Panel     Order Specific Question:   Release to patient     Answer:   Routine Release [[id number]]       Current Outpatient Medications   Medication Sig Dispense Refill    amLODIPine (NORVASC) 10 MG tablet Take 1 tablet by mouth Daily. 90 tablet 3    amoxicillin (AMOXIL) 500 MG capsule TAKE 1 CAPSULE BY MOUTH 4 TIMES DAILY UNTIL GONE      atorvastatin (LIPITOR) 40 MG tablet Take 1 tablet by mouth Daily. 90 tablet 3    diclofenac (VOLTAREN) 1 % gel gel Apply 4 g topically to the appropriate area as directed 4 (Four) Times a Day As Needed (pain). 150 g 2    famotidine (Pepcid) 20 MG tablet Take 1 tablet by mouth 2 (Two) Times a Day As Needed for Heartburn. 180 tablet 3    metFORMIN ER (GLUCOPHAGE-XR) 500 MG 24 hr tablet Take 2 tablets by mouth 2 (Two) Times a Day. 360 tablet 0    metoprolol tartrate (LOPRESSOR) 25 MG tablet TAKE 1 TABLET TWICE A  tablet 3    metoprolol tartrate (LOPRESSOR) 50 MG tablet TAKE 1 TABLET TWICE A  tablet 3    multivitamin with minerals tablet tablet Take 1 tablet by mouth Daily.      nitroglycerin (NITROSTAT) 0.4 MG SL tablet Place 1 tablet under the tongue Every 5 (Five) Minutes As Needed for Chest Pain. 30 tablet 2    pantoprazole (PROTONIX) 40 MG EC tablet TAKE 1 TABLET DAILY 90 tablet 3    aspirin 81 MG EC tablet Take 1 tablet by mouth Daily.      glipizide (GLUCOTROL XL) 5 MG ER tablet Take 1 tablet by mouth Daily. 180 tablet 3    pioglitazone (Actos) 15 MG tablet Take 1 tablet by mouth Daily. 90 tablet 3     No current facility-administered medications for this visit.       Kimberli Magallanes had no medications administered during this visit.    No follow-ups on file.    There are no Patient Instructions on file for this visit.

## 2023-12-08 LAB
BUN SERPL-MCNC: 9 MG/DL (ref 8–23)
BUN/CREAT SERPL: 10.2 (ref 7–25)
CALCIUM SERPL-MCNC: 9.9 MG/DL (ref 8.6–10.5)
CHLORIDE SERPL-SCNC: 103 MMOL/L (ref 98–107)
CO2 SERPL-SCNC: 24.9 MMOL/L (ref 22–29)
CREAT SERPL-MCNC: 0.88 MG/DL (ref 0.57–1)
EGFRCR SERPLBLD CKD-EPI 2021: 74.9 ML/MIN/1.73
GLUCOSE SERPL-MCNC: 340 MG/DL (ref 65–99)
HBA1C MFR BLD: 11.3 % (ref 4.8–5.6)
POTASSIUM SERPL-SCNC: 4.4 MMOL/L (ref 3.5–5.2)
SODIUM SERPL-SCNC: 139 MMOL/L (ref 136–145)

## 2024-01-11 ENCOUNTER — OFFICE VISIT (OUTPATIENT)
Dept: FAMILY MEDICINE CLINIC | Facility: CLINIC | Age: 64
End: 2024-01-11
Payer: COMMERCIAL

## 2024-01-11 VITALS
SYSTOLIC BLOOD PRESSURE: 138 MMHG | HEART RATE: 78 BPM | DIASTOLIC BLOOD PRESSURE: 82 MMHG | HEIGHT: 58 IN | WEIGHT: 214.8 LBS | RESPIRATION RATE: 18 BRPM | BODY MASS INDEX: 45.09 KG/M2 | OXYGEN SATURATION: 99 %

## 2024-01-11 DIAGNOSIS — E11.59 TYPE 2 DIABETES MELLITUS WITH OTHER CIRCULATORY COMPLICATION, WITHOUT LONG-TERM CURRENT USE OF INSULIN: ICD-10-CM

## 2024-01-11 DIAGNOSIS — I10 PRIMARY HYPERTENSION: Primary | ICD-10-CM

## 2024-01-11 DIAGNOSIS — T50.905A MEDICATION ADVERSE EFFECT, INITIAL ENCOUNTER: ICD-10-CM

## 2024-01-11 RX ORDER — METFORMIN HYDROCHLORIDE 500 MG/1
500 TABLET, EXTENDED RELEASE ORAL 2 TIMES DAILY
Qty: 180 TABLET | Refills: 3 | Status: SHIPPED | OUTPATIENT
Start: 2024-01-11

## 2024-01-11 NOTE — PROGRESS NOTES
Chief Complaint   Patient presents with    Follow-up    Diabetes    Hypertension    Hyperlipidemia       Subjective   Kimberli Magallanes is a 63 y.o. female.     Diabetes  Pertinent negatives for hypoglycemia include no confusion. Pertinent negatives for diabetes include no blurred vision, no chest pain, no fatigue, no polyuria and no weakness.   Hypertension  Pertinent negatives include no blurred vision, chest pain or shortness of breath.   Hyperlipidemia  Pertinent negatives include no chest pain, myalgias or shortness of breath.      F/U DM2.  BS average last 30 days is 119. Having diarrhea currently with meds.  Walking regularly now.    F/U HTN.  Doing well with meds.  ON metoprolol 75 BID.    F/U hyperlipidmeia  No myalgias.  Doing well with atorvastatin 40 a day.    The following portions of the patient's history were reviewed and updated as appropriate: allergies, current medications, past family history, past medical history, past social history, past surgical history and problem list.    Review of Systems   Constitutional:  Negative for appetite change and fatigue.   HENT:  Negative for nosebleeds and sore throat.    Eyes:  Negative for blurred vision and visual disturbance.   Respiratory:  Negative for shortness of breath and wheezing.    Cardiovascular:  Negative for chest pain and leg swelling.   Gastrointestinal:  Negative for abdominal distention and abdominal pain.   Endocrine: Negative for cold intolerance and polyuria.   Genitourinary:  Negative for dysuria and hematuria.   Musculoskeletal:  Negative for arthralgias and myalgias.   Skin:  Negative for color change and rash.   Neurological:  Negative for weakness and confusion.   Psychiatric/Behavioral:  Negative for agitation and depressed mood.        Patient Active Problem List   Diagnosis    Type 2 diabetes mellitus    Hypertension    Hyperlipidemia    Esophageal reflux    Visit for screening mammogram    NSTEMI (non-ST elevated myocardial infarction)     S/P PTCA (percutaneous transluminal coronary angioplasty)    Atypical chest pain    Hospital discharge follow-up    Anxiety    Coronary artery disease involving native coronary artery of native heart with angina pectoris    Xyphoidalgia    Abdominal bloating    Encounter for annual health examination    Trigger middle finger of right hand    BMI 40.0-44.9, adult    Hiatal hernia    Medication adverse effect, initial encounter       Allergies   Allergen Reactions    Lisinopril Anaphylaxis and Swelling    Losartan Anaphylaxis and Swelling    Citrullus Vulgaris Unknown - Low Severity    Eggs Or Egg-Derived Products Unknown - Low Severity         Current Outpatient Medications:     amLODIPine (NORVASC) 10 MG tablet, Take 1 tablet by mouth Daily., Disp: 90 tablet, Rfl: 3    aspirin 81 MG EC tablet, Take 1 tablet by mouth Daily., Disp: , Rfl:     atorvastatin (LIPITOR) 40 MG tablet, Take 1 tablet by mouth Daily., Disp: 90 tablet, Rfl: 3    famotidine (Pepcid) 20 MG tablet, Take 1 tablet by mouth 2 (Two) Times a Day As Needed for Heartburn., Disp: 180 tablet, Rfl: 3    glipizide (GLUCOTROL XL) 5 MG ER tablet, Take 1 tablet by mouth Daily., Disp: 180 tablet, Rfl: 3    metFORMIN ER (GLUCOPHAGE-XR) 500 MG 24 hr tablet, Take 1 tablet by mouth 2 (Two) Times a Day., Disp: 180 tablet, Rfl: 3    metoprolol tartrate (LOPRESSOR) 25 MG tablet, TAKE 1 TABLET TWICE A DAY, Disp: 180 tablet, Rfl: 3    metoprolol tartrate (LOPRESSOR) 50 MG tablet, TAKE 1 TABLET TWICE A DAY, Disp: 180 tablet, Rfl: 3    multivitamin with minerals tablet tablet, Take 1 tablet by mouth Daily., Disp: , Rfl:     nitroglycerin (NITROSTAT) 0.4 MG SL tablet, Place 1 tablet under the tongue Every 5 (Five) Minutes As Needed for Chest Pain., Disp: 30 tablet, Rfl: 2    pantoprazole (PROTONIX) 40 MG EC tablet, TAKE 1 TABLET DAILY, Disp: 90 tablet, Rfl: 3    pioglitazone (Actos) 15 MG tablet, Take 1 tablet by mouth Daily., Disp: 90 tablet, Rfl: 3    Past Medical  History:   Diagnosis Date    Abnormal electrocardiogram     Adverse reaction to ACE inhibitor drug     Adverse reaction to angiotensin 2 receptor antagonist     BMI 40.0-44.9, adult     Coronary artery disease 01/15/2020    Stent was placed.    Diabetes mellitus     Diet Controlled    Encounter for annual health examination     Encounter for hepatitis C screening test for low risk patient     Esophageal reflux     Foot deformity, acquired, right     Glucosuria     Hematuria, microscopic     Hyperlipidemia     Hypertension     Refused influenza vaccine     Visit for screening mammogram        Past Surgical History:   Procedure Laterality Date    BREAST BIOPSY Left 2019    benign calcification    CARDIAC CATHETERIZATION      x1 stent LAD at [facility]    COLONOSCOPY  approx 2016    negative per pt     CORONARY STENT PLACEMENT  01/15/2020    ENDOSCOPY      Dr. Martínez       Family History   Problem Relation Age of Onset    Heart attack Mother 67    Stroke Mother     Hypertension Mother     Heart disease Mother 67    Arthritis Mother     Depression Mother     Diabetes Father     Hypertension Father     Heart disease Father 58    Alcohol abuse Father     Heart attack Maternal Grandmother     Cancer Maternal Grandmother     No Known Problems Other     Breast cancer Neg Hx        Social History     Tobacco Use    Smoking status: Former     Packs/day: 0.25     Years: 10.00     Additional pack years: 0.00     Total pack years: 2.50     Types: Cigarettes     Start date: 1979     Quit date: 2012     Years since quittin.0     Passive exposure: Past    Smokeless tobacco: Never    Tobacco comments:     I quite in , then started agin, then quite but finally stopped in .   Substance Use Topics    Alcohol use: Yes     Alcohol/week: 0.0 - 2.0 standard drinks of alcohol     Comment: social            Objective     Vitals:    24 1306   BP: 138/82   Pulse: 78   Resp: 18   SpO2: 99%     Body mass  index is 44.91 kg/m².    Physical Exam  Vitals reviewed.   Constitutional:       Appearance: She is well-developed. She is not diaphoretic.   HENT:      Head: Normocephalic and atraumatic.   Eyes:      General: No scleral icterus.     Pupils: Pupils are equal, round, and reactive to light.   Neck:      Thyroid: No thyromegaly.   Cardiovascular:      Rate and Rhythm: Normal rate and regular rhythm.      Heart sounds: No murmur heard.     No friction rub. No gallop.   Pulmonary:      Effort: Pulmonary effort is normal. No respiratory distress.      Breath sounds: No wheezing or rales.   Chest:      Chest wall: No tenderness.   Abdominal:      General: Bowel sounds are normal. There is no distension.      Palpations: Abdomen is soft.      Tenderness: There is no abdominal tenderness.   Musculoskeletal:         General: No deformity. Normal range of motion.   Lymphadenopathy:      Cervical: No cervical adenopathy.   Skin:     General: Skin is warm and dry.      Findings: No rash.   Neurological:      Cranial Nerves: No cranial nerve deficit.      Motor: No abnormal muscle tone.         Lab Results   Component Value Date    GLUCOSE 340 (H) 12/07/2023    BUN 9 12/07/2023    CREATININE 0.88 12/07/2023    EGFRIFNONA 79 09/28/2021    EGFRIFAFRI 91 09/28/2021    BCR 10.2 12/07/2023    K 4.4 12/07/2023    CO2 24.9 12/07/2023    CALCIUM 9.9 12/07/2023    PROTENTOTREF 7.5 02/14/2023    ALBUMIN 4.5 11/28/2023    LABIL2 1.9 02/14/2023    AST 19 11/28/2023    ALT 21 11/28/2023       WBC   Date Value Ref Range Status   11/28/2023 8.02 3.40 - 10.80 10*3/mm3 Final   07/21/2020 8.4 3.4 - 10.8 x10E3/uL Final   01/16/2020 11.15 (H) 4.5 - 11.0 10*3/uL Final     RBC   Date Value Ref Range Status   11/28/2023 4.77 3.77 - 5.28 10*6/mm3 Final   07/21/2020 4.73 3.77 - 5.28 x10E6/uL Final   01/16/2020 5.05 4.0 - 5.2 10*6/uL Final     Hemoglobin   Date Value Ref Range Status   11/28/2023 13.7 12.0 - 15.9 g/dL Final   01/16/2020 14.9 12.0 - 16.0  g/dL Final     Hematocrit   Date Value Ref Range Status   11/28/2023 41.6 34.0 - 46.6 % Final   01/16/2020 45.8 36.0 - 46.0 % Final     MCV   Date Value Ref Range Status   11/28/2023 87.2 79.0 - 97.0 fL Final   01/16/2020 90.7 80.0 - 100.0 fL Final     MCH   Date Value Ref Range Status   11/28/2023 28.7 26.6 - 33.0 pg Final   01/16/2020 29.5 26.0 - 34.0 pg Final     MCHC   Date Value Ref Range Status   11/28/2023 32.9 31.5 - 35.7 g/dL Final   01/16/2020 32.5 31.0 - 37.0 g/dL Final     RDW   Date Value Ref Range Status   11/28/2023 14.4 12.3 - 15.4 % Final   01/16/2020 15.1 12.0 - 16.8 % Final     RDW-SD   Date Value Ref Range Status   11/28/2023 45.8 37.0 - 54.0 fl Final     MPV   Date Value Ref Range Status   11/28/2023 10.5 6.0 - 12.0 fL Final   01/16/2020 11.6 (H) 6.7 - 10.8 fL Final     Platelets   Date Value Ref Range Status   11/28/2023 250 140 - 450 10*3/mm3 Final   01/16/2020 220 140 - 440 10*3/uL Final     Neutrophil Rel %   Date Value Ref Range Status   01/16/2020 66.5 45 - 80 % Final     Neutrophil %   Date Value Ref Range Status   11/28/2023 64.5 42.7 - 76.0 % Final     Lymphocyte Rel %   Date Value Ref Range Status   01/16/2020 25.5 15 - 50 % Final     Lymphocyte %   Date Value Ref Range Status   11/28/2023 24.4 19.6 - 45.3 % Final     Monocyte Rel %   Date Value Ref Range Status   01/16/2020 6.7 0 - 15 % Final     Monocyte %   Date Value Ref Range Status   11/28/2023 8.1 5.0 - 12.0 % Final     Eosinophil %   Date Value Ref Range Status   11/28/2023 1.9 0.3 - 6.2 % Final   01/16/2020 0.8 0 - 7 % Final     Basophil Rel %   Date Value Ref Range Status   01/16/2020 0.3 0 - 2 % Final     Basophil %   Date Value Ref Range Status   11/28/2023 0.9 0.0 - 1.5 % Final     Immature Grans %   Date Value Ref Range Status   11/28/2023 0.2 0.0 - 0.5 % Final   01/16/2020 0.2 (H) 0 % Final     Neutrophils Absolute   Date Value Ref Range Status   01/16/2020 7.42 2.0 - 8.8 10*3/uL Final     Neutrophils, Absolute   Date  "Value Ref Range Status   11/28/2023 5.17 1.70 - 7.00 10*3/mm3 Final     Lymphocytes Absolute   Date Value Ref Range Status   01/16/2020 2.84 0.7 - 5.5 10*3/uL Final     Lymphocytes, Absolute   Date Value Ref Range Status   11/28/2023 1.96 0.70 - 3.10 10*3/mm3 Final     Monocytes Absolute   Date Value Ref Range Status   01/16/2020 0.75 0.0 - 1.7 10*3/uL Final     Monocytes, Absolute   Date Value Ref Range Status   11/28/2023 0.65 0.10 - 0.90 10*3/mm3 Final     Eosinophils Absolute   Date Value Ref Range Status   01/16/2020 0.09 0.0 - 0.8 10*3/uL Final     Eosinophils, Absolute   Date Value Ref Range Status   11/28/2023 0.15 0.00 - 0.40 10*3/mm3 Final     Basophils Absolute   Date Value Ref Range Status   01/16/2020 0.03 0.0 - 0.2 10*3/uL Final     Basophils, Absolute   Date Value Ref Range Status   11/28/2023 0.07 0.00 - 0.20 10*3/mm3 Final     Immature Grans, Absolute   Date Value Ref Range Status   11/28/2023 0.02 0.00 - 0.05 10*3/mm3 Final   01/16/2020 0.02 <1 10*3/uL Final     nRBC   Date Value Ref Range Status   11/28/2023 0.0 0.0 - 0.2 /100 WBC Final       Lab Results   Component Value Date    HGBA1C 11.30 (H) 12/07/2023       No results found for: \"RMXOHFTK89\"    No results found for: \"TSH\"    No results found for: \"CHOL\"  Lab Results   Component Value Date    TRIG 103 02/14/2023     Lab Results   Component Value Date    HDL 71 (H) 02/14/2023     Lab Results   Component Value Date    LDL 73 02/14/2023     Lab Results   Component Value Date    VLDL 18 02/14/2023     No results found for: \"LDLHDL\"      Procedures    Assessment & Plan   Problems Addressed this Visit       Type 2 diabetes mellitus    Relevant Medications    metFORMIN ER (GLUCOPHAGE-XR) 500 MG 24 hr tablet    Hypertension - Primary    Medication adverse effect, initial encounter     Diagnoses         Codes Comments    Primary hypertension    -  Primary ICD-10-CM: I10  ICD-9-CM: 401.9     Type 2 diabetes mellitus with other circulatory complication, " without long-term current use of insulin     ICD-10-CM: E11.59  ICD-9-CM: 250.70     Medication adverse effect, initial encounter     ICD-10-CM: T50.905A  ICD-9-CM: E947.9         DM2 with med side effect.  Decrease metformin to  BID.  Prevnar 20 today.     Htn.  Controlled.  Continue meds.      Orders Placed This Encounter   Procedures    Pneumococcal Conjugate Vaccine 20-Valent (PCV20)       Current Outpatient Medications   Medication Sig Dispense Refill    amLODIPine (NORVASC) 10 MG tablet Take 1 tablet by mouth Daily. 90 tablet 3    aspirin 81 MG EC tablet Take 1 tablet by mouth Daily.      atorvastatin (LIPITOR) 40 MG tablet Take 1 tablet by mouth Daily. 90 tablet 3    famotidine (Pepcid) 20 MG tablet Take 1 tablet by mouth 2 (Two) Times a Day As Needed for Heartburn. 180 tablet 3    glipizide (GLUCOTROL XL) 5 MG ER tablet Take 1 tablet by mouth Daily. 180 tablet 3    metFORMIN ER (GLUCOPHAGE-XR) 500 MG 24 hr tablet Take 1 tablet by mouth 2 (Two) Times a Day. 180 tablet 3    metoprolol tartrate (LOPRESSOR) 25 MG tablet TAKE 1 TABLET TWICE A  tablet 3    metoprolol tartrate (LOPRESSOR) 50 MG tablet TAKE 1 TABLET TWICE A  tablet 3    multivitamin with minerals tablet tablet Take 1 tablet by mouth Daily.      nitroglycerin (NITROSTAT) 0.4 MG SL tablet Place 1 tablet under the tongue Every 5 (Five) Minutes As Needed for Chest Pain. 30 tablet 2    pantoprazole (PROTONIX) 40 MG EC tablet TAKE 1 TABLET DAILY 90 tablet 3    pioglitazone (Actos) 15 MG tablet Take 1 tablet by mouth Daily. 90 tablet 3     No current facility-administered medications for this visit.       Kimberli Magallanes had no medications administered during this visit.    Return in about 3 months (around 4/11/2024).    There are no Patient Instructions on file for this visit.

## 2024-01-12 ENCOUNTER — TELEPHONE (OUTPATIENT)
Dept: FAMILY MEDICINE CLINIC | Facility: CLINIC | Age: 64
End: 2024-01-12
Payer: COMMERCIAL

## 2024-01-12 DIAGNOSIS — E11.59 TYPE 2 DIABETES MELLITUS WITH OTHER CIRCULATORY COMPLICATION, WITHOUT LONG-TERM CURRENT USE OF INSULIN: ICD-10-CM

## 2024-01-12 RX ORDER — PIOGLITAZONEHYDROCHLORIDE 15 MG/1
15 TABLET ORAL DAILY
Qty: 90 TABLET | Refills: 3 | Status: SHIPPED | OUTPATIENT
Start: 2024-01-12

## 2024-01-12 RX ORDER — GLIPIZIDE 5 MG/1
5 TABLET, FILM COATED, EXTENDED RELEASE ORAL DAILY
Qty: 180 TABLET | Refills: 3 | Status: SHIPPED | OUTPATIENT
Start: 2024-01-12

## 2024-01-24 DIAGNOSIS — I10 ESSENTIAL HYPERTENSION: ICD-10-CM

## 2024-01-24 RX ORDER — AMLODIPINE BESYLATE 10 MG/1
10 TABLET ORAL DAILY
Qty: 90 TABLET | Refills: 3 | Status: SHIPPED | OUTPATIENT
Start: 2024-01-24

## 2024-02-05 DIAGNOSIS — K21.9 GASTROESOPHAGEAL REFLUX DISEASE WITHOUT ESOPHAGITIS: ICD-10-CM

## 2024-02-05 RX ORDER — PANTOPRAZOLE SODIUM 40 MG/1
TABLET, DELAYED RELEASE ORAL
Qty: 90 TABLET | Refills: 3 | Status: SHIPPED | OUTPATIENT
Start: 2024-02-05

## 2024-03-18 DIAGNOSIS — E78.5 HYPERLIPIDEMIA, UNSPECIFIED HYPERLIPIDEMIA TYPE: ICD-10-CM

## 2024-03-18 RX ORDER — ATORVASTATIN CALCIUM 40 MG/1
40 TABLET, FILM COATED ORAL DAILY
Qty: 90 TABLET | Refills: 1 | Status: SHIPPED | OUTPATIENT
Start: 2024-03-18

## 2024-03-28 ENCOUNTER — OFFICE VISIT (OUTPATIENT)
Age: 64
End: 2024-03-28
Payer: COMMERCIAL

## 2024-03-28 VITALS
WEIGHT: 218 LBS | DIASTOLIC BLOOD PRESSURE: 86 MMHG | HEIGHT: 58 IN | BODY MASS INDEX: 45.76 KG/M2 | SYSTOLIC BLOOD PRESSURE: 124 MMHG | HEART RATE: 62 BPM

## 2024-03-28 DIAGNOSIS — I25.119 CORONARY ARTERY DISEASE INVOLVING NATIVE CORONARY ARTERY OF NATIVE HEART WITH ANGINA PECTORIS: Primary | ICD-10-CM

## 2024-03-28 DIAGNOSIS — E78.5 HYPERLIPIDEMIA, UNSPECIFIED HYPERLIPIDEMIA TYPE: ICD-10-CM

## 2024-03-28 DIAGNOSIS — Z98.61 S/P PTCA (PERCUTANEOUS TRANSLUMINAL CORONARY ANGIOPLASTY): ICD-10-CM

## 2024-03-28 DIAGNOSIS — I48.92 ATRIAL FLUTTER WITH RAPID VENTRICULAR RESPONSE: ICD-10-CM

## 2024-03-28 PROCEDURE — 93000 ELECTROCARDIOGRAM COMPLETE: CPT | Performed by: INTERNAL MEDICINE

## 2024-03-28 PROCEDURE — 99214 OFFICE O/P EST MOD 30 MIN: CPT | Performed by: INTERNAL MEDICINE

## 2024-03-28 NOTE — PROGRESS NOTES
Date of Office Visit: 24    Encounter Provider: Lex Morales MD  Place of Service: [facility] CARDIOLOGY  Patient Name: Kimberli Magallanes  :1960    Chief complaint:  History of coronary artery disease and PCI    HPI:  63-year-old with medical history of coronary artery disease, non-ST elevation MI, essential hypertension, diabetes mellitus, who had a non-ST elevation MI in the [facility] in 2020.  She had a long first diagonal /ramus with an 80% stenosis that was stented.  She had mild disease in the LAD and the RCA was nondominant.    In 2022 she ran out of her metoprolol and went into atrial flutter with 2:1 conduction.  She subsequently was given IV metoprolol in the ER and converted back to sinus.  She states that occasionally she will have palpitations but has not had any recurrence of atrial flutter.  She had a Zio patch placed with no evidence of atrial flutter or fibrillation.  Anticoagulant therapy was not started.     She states she has been doing well from a cardiac standpoint.  She denies any recent issues with palpitations or tachycardia.  Her ventricular rate is 60 bpm.  Lipid panel was well-controlled last year.  Her diabetes mellitus is poorly controlled and she did have glipizide and Actos added to her current regimen.      Past Medical History:   Diagnosis Date    Abnormal electrocardiogram     Adverse reaction to ACE inhibitor drug     Adverse reaction to angiotensin 2 receptor antagonist     BMI 40.0-44.9, adult     Coronary artery disease 01/15/2020    Stent was placed.    Diabetes mellitus     Diet Controlled    Encounter for annual health examination     Encounter for hepatitis C screening test for low risk patient     Esophageal reflux     Foot deformity, acquired, right     Glucosuria     Hematuria, microscopic     Hyperlipidemia     Hypertension     Refused influenza vaccine     Visit for screening mammogram        Past  Surgical History:   Procedure Laterality Date    BREAST BIOPSY Left 2019    benign calcification    CARDIAC CATHETERIZATION      x1 stent LAD at [facility]    COLONOSCOPY  approx 2016    negative per pt     CORONARY STENT PLACEMENT  01/15/2020    ENDOSCOPY      Dr. Martínez       Social History     Socioeconomic History    Marital status:    Tobacco Use    Smoking status: Former     Current packs/day: 0.00     Average packs/day: 0.3 packs/day for 32.4 years (8.1 ttl pk-yrs)     Types: Cigarettes     Start date: 1979     Quit date: 2012     Years since quittin.2     Passive exposure: Past    Smokeless tobacco: Never    Tobacco comments:     I quite in , then started agin, then quite but finally stopped in .   Vaping Use    Vaping status: Never Used   Substance and Sexual Activity    Alcohol use: Yes     Alcohol/week: 0.0 - 2.0 standard drinks of alcohol     Comment: social    Drug use: No    Sexual activity: Not Currently     Birth control/protection: Post-menopausal       Family History   Problem Relation Age of Onset    Heart attack Mother 67    Stroke Mother     Hypertension Mother     Heart disease Mother 67    Arthritis Mother     Depression Mother     Diabetes Father     Hypertension Father     Heart disease Father 58    Alcohol abuse Father     Heart attack Maternal Grandmother     Cancer Maternal Grandmother     No Known Problems Other     Breast cancer Neg Hx        Review of Systems   Constitutional: Negative for fever and malaise/fatigue.   HENT:  Negative for nosebleeds and sore throat.    Eyes:  Negative for blurred vision and double vision.   Cardiovascular:  Negative for chest pain, claudication, palpitations and syncope.   Respiratory:  Negative for cough, shortness of breath and snoring.    Endocrine: Negative for cold intolerance, heat intolerance and polydipsia.   Skin:  Negative for itching, poor wound healing and rash.   Musculoskeletal:  Negative for  "joint pain, joint swelling, muscle weakness and myalgias.   Gastrointestinal:  Negative for abdominal pain, melena, nausea and vomiting.   Neurological:  Negative for light-headedness, loss of balance, seizures, vertigo and weakness.   Psychiatric/Behavioral:  Negative for altered mental status and depression.        Allergies   Allergen Reactions    Lisinopril Anaphylaxis and Swelling    Losartan Anaphylaxis and Swelling    Citrullus Vulgaris Unknown - Low Severity    Egg-Derived Products Unknown - Low Severity         Current Outpatient Medications:     amLODIPine (NORVASC) 10 MG tablet, TAKE 1 TABLET DAILY, Disp: 90 tablet, Rfl: 3    aspirin 81 MG EC tablet, Take 1 tablet by mouth Daily., Disp: , Rfl:     atorvastatin (LIPITOR) 40 MG tablet, TAKE 1 TABLET DAILY, Disp: 90 tablet, Rfl: 1    famotidine (Pepcid) 20 MG tablet, Take 1 tablet by mouth 2 (Two) Times a Day As Needed for Heartburn., Disp: 180 tablet, Rfl: 3    glipizide (GLUCOTROL XL) 5 MG ER tablet, Take 1 tablet by mouth Daily., Disp: 180 tablet, Rfl: 3    metFORMIN ER (GLUCOPHAGE-XR) 500 MG 24 hr tablet, Take 1 tablet by mouth 2 (Two) Times a Day., Disp: 180 tablet, Rfl: 3    metoprolol tartrate (LOPRESSOR) 25 MG tablet, TAKE 1 TABLET TWICE A DAY, Disp: 180 tablet, Rfl: 3    metoprolol tartrate (LOPRESSOR) 50 MG tablet, TAKE 1 TABLET TWICE A DAY, Disp: 180 tablet, Rfl: 3    multivitamin with minerals tablet tablet, Take 1 tablet by mouth Daily., Disp: , Rfl:     nitroglycerin (NITROSTAT) 0.4 MG SL tablet, Place 1 tablet under the tongue Every 5 (Five) Minutes As Needed for Chest Pain., Disp: 30 tablet, Rfl: 2    pantoprazole (PROTONIX) 40 MG EC tablet, TAKE 1 TABLET DAILY, Disp: 90 tablet, Rfl: 3    pioglitazone (Actos) 15 MG tablet, Take 1 tablet by mouth Daily., Disp: 90 tablet, Rfl: 3      Objective:     Vitals:    03/28/24 1546   BP: 124/86   Pulse: 62   Weight: 98.9 kg (218 lb)   Height: 147.3 cm (58\")     Body mass index is 45.56 " kg/m².    PHYSICAL EXAM:    Constitutional:       General: Not in acute distress.     Appearance: Normal appearance. Well-developed.   Eyes:      Pupils: Pupils are equal, round, and reactive to light.   HENT:      Head: Normocephalic.   Neck:      Vascular: No carotid bruit or JVD.   Pulmonary:      Effort: Pulmonary effort is normal. No tachypnea.      Breath sounds: Normal breath sounds. No wheezing. No rales.   Cardiovascular:      Normal rate. Regular rhythm.      No gallop.    Pulses:     Intact distal pulses.   Edema:     Pretibial: bilateral 1+ edema of the pretibial area.     Ankle: bilateral 1+ edema of the ankle.  Abdominal:      General: Bowel sounds are normal.      Palpations: Abdomen is soft.      Tenderness: There is no abdominal tenderness.   Musculoskeletal: Normal range of motion.      Cervical back: Normal range of motion and neck supple. No edema. Skin:     General: Skin is warm and dry.   Neurological:      Mental Status: Alert and oriented to person, place, and time.           ECG 12 Lead    Date/Time: 3/28/2024 4:07 PM  Performed by: Lex Morales MD    Authorized by: Lex Morales MD  Rhythm: sinus rhythm  Rate: normal  QRS axis: left    Clinical impression: non-specific ECG  Comments: Nonspecific t abnormalities inferior leads.                 Assessment:     Plan:   63-year-old female with a medical history of coronary artery disease, PCI, hypertension, hyperlipidemia, diabetes mellitus and atrial flutter with RVR off of beta-blockade who presents back in follow-up.  This was an isolated episode and she has had no recurrence.  She wore a Zio patch with no evidence of atrial fibrillation or atrial flutter.  She is not on anticoagulant therapy.  She is doing well from a cardiac standpoint.  Denies any recent issues with chest pain consistent with angina.    1.  Paroxysmal atrial flutter: Isolated episode when off of beta-blockade  -Prior Zio patch reviewed.  No atrial  fibrillation or flutter.  Have not recommended anticoagulant therapy.  Continue aspirin  -Continue metoprolol tartrate 75 mg p.o. every 12 hours.  She is aware.    2.  Essential hypertension: BP is well controlled today. No changes in her Amlodipine and Metoprolol dosing.     3.  Coronary artery disease with prior PCI.  No angina at this time.  Continue aspirin 81 mg p.o. daily along with statin therapy.          Your medication list            Accurate as of March 28, 2024  4:02 PM. If you have any questions, ask your nurse or doctor.                CONTINUE taking these medications        Instructions Last Dose Given Next Dose Due   amLODIPine 10 MG tablet  Commonly known as: NORVASC      TAKE 1 TABLET DAILY       aspirin 81 MG EC tablet      Take 1 tablet by mouth Daily.       atorvastatin 40 MG tablet  Commonly known as: LIPITOR      TAKE 1 TABLET DAILY       famotidine 20 MG tablet  Commonly known as: Pepcid      Take 1 tablet by mouth 2 (Two) Times a Day As Needed for Heartburn.       glipizide 5 MG ER tablet  Commonly known as: GLUCOTROL XL      Take 1 tablet by mouth Daily.       metFORMIN  MG 24 hr tablet  Commonly known as: GLUCOPHAGE-XR      Take 1 tablet by mouth 2 (Two) Times a Day.       metoprolol tartrate 25 MG tablet  Commonly known as: LOPRESSOR      TAKE 1 TABLET TWICE A DAY       metoprolol tartrate 50 MG tablet  Commonly known as: LOPRESSOR      TAKE 1 TABLET TWICE A DAY       multivitamin with minerals tablet tablet      Take 1 tablet by mouth Daily.       nitroglycerin 0.4 MG SL tablet  Commonly known as: NITROSTAT      Place 1 tablet under the tongue Every 5 (Five) Minutes As Needed for Chest Pain.       pantoprazole 40 MG EC tablet  Commonly known as: PROTONIX      TAKE 1 TABLET DAILY       pioglitazone 15 MG tablet  Commonly known as: Actos      Take 1 tablet by mouth Daily.

## 2024-04-10 ENCOUNTER — OFFICE VISIT (OUTPATIENT)
Dept: FAMILY MEDICINE CLINIC | Facility: CLINIC | Age: 64
End: 2024-04-10
Payer: COMMERCIAL

## 2024-04-10 VITALS
TEMPERATURE: 97.3 F | WEIGHT: 218.4 LBS | SYSTOLIC BLOOD PRESSURE: 154 MMHG | HEIGHT: 58 IN | DIASTOLIC BLOOD PRESSURE: 88 MMHG | OXYGEN SATURATION: 100 % | BODY MASS INDEX: 45.84 KG/M2 | HEART RATE: 63 BPM

## 2024-04-10 DIAGNOSIS — I10 PRIMARY HYPERTENSION: ICD-10-CM

## 2024-04-10 DIAGNOSIS — E78.5 HYPERLIPIDEMIA, UNSPECIFIED HYPERLIPIDEMIA TYPE: ICD-10-CM

## 2024-04-10 DIAGNOSIS — E11.59 TYPE 2 DIABETES MELLITUS WITH OTHER CIRCULATORY COMPLICATION, WITHOUT LONG-TERM CURRENT USE OF INSULIN: Primary | ICD-10-CM

## 2024-04-10 PROCEDURE — 99214 OFFICE O/P EST MOD 30 MIN: CPT | Performed by: INTERNAL MEDICINE

## 2024-04-10 NOTE — PROGRESS NOTES
Subjective   Kimberli Magallanes is a 63 y.o. female.     Chief Complaint   Patient presents with    Diabetes       History of Present Illness   History of hypertension.  Currently, has been feeling well and asymptomatic without any headaches,vision changes, cough, chest pain, shortness of breath, swelling, focal neurologic deficit, memory loss or syncope.  Has been taking the medications regularly and adherent with the regimen of amlodipine 10 mg, metoprolol 75 mg BID. Denies medication side effects and no significant interval events.       History of high cholesterol.  Currently, has been feeling well without any myalgias, muscle aches, weakness, numbness, chest pain, short of breath or other issues.  Currently, is adherent with medication regimen of atorvastatin 40 mg and denies medication side effects. Is due for lab follow-up. Last LDL of 73 on  2/14/2023.     History of diabetes. In November 2023 developed severe dental infection and lost multiple teeth and developed uncontrolled BS and meds adjusted.   Patient states to have been compliant with medications and trying to exercise more.  Blood sugar monitoring - patient states has been running on average 118.  With a range of 177-97.  No episodes of hypoglycemia, nausea, vomiting, new rashes, syncope or other issues.  Currently, on glipizide XL 5 mg, pioglitazone 15 mg, and metformin XR 1000 mg twice a day.  Last A1c 12/7/23 of 11.3% and 2/14/2023 of 7.0%.     The following portions of the patient's history were reviewed and updated as appropriate: allergies, current medications, past family history, past medical history, past social history, past surgical history and problem list.    Depression Screen:      12/7/2023     4:00 PM   PHQ-2/PHQ-9 Depression Screening   Little Interest or Pleasure in Doing Things 0-->not at all   Feeling Down, Depressed or Hopeless 0-->not at all   PHQ-9: Brief Depression Severity Measure Score 0       Past Medical History:   Diagnosis  Date    Abnormal electrocardiogram     Adverse reaction to ACE inhibitor drug     Adverse reaction to angiotensin 2 receptor antagonist     BMI 40.0-44.9, adult     Coronary artery disease 01/15/2020    Stent was placed.    Diabetes mellitus     Diet Controlled    Encounter for annual health examination     Encounter for hepatitis C screening test for low risk patient     Esophageal reflux     Foot deformity, acquired, right     Glucosuria     Hematuria, microscopic     Hyperlipidemia     Hypertension     Refused influenza vaccine     Visit for screening mammogram        Past Surgical History:   Procedure Laterality Date    BREAST BIOPSY Left 2019    benign calcification    CARDIAC CATHETERIZATION      x1 stent LAD at [facility]    COLONOSCOPY  approx 2016    negative per pt     CORONARY STENT PLACEMENT  01/15/2020    ENDOSCOPY      Dr. Martínez       Family History   Problem Relation Age of Onset    Heart attack Mother 67    Stroke Mother     Hypertension Mother     Heart disease Mother 67    Arthritis Mother     Depression Mother     Diabetes Father     Hypertension Father     Heart disease Father 58    Alcohol abuse Father     Heart attack Maternal Grandmother     Cancer Maternal Grandmother     No Known Problems Other     Breast cancer Neg Hx        Social History     Socioeconomic History    Marital status:    Tobacco Use    Smoking status: Former     Current packs/day: 0.00     Average packs/day: 0.3 packs/day for 32.4 years (8.1 ttl pk-yrs)     Types: Cigarettes     Start date: 1979     Quit date: 2012     Years since quittin.2     Passive exposure: Past    Smokeless tobacco: Never    Tobacco comments:     I quite in , then started agin, then quite but finally stopped in .   Vaping Use    Vaping status: Never Used   Substance and Sexual Activity    Alcohol use: Yes     Alcohol/week: 0.0 - 2.0 standard drinks of alcohol     Comment: social    Drug use: No    Sexual  activity: Not Currently     Birth control/protection: Post-menopausal       Current Outpatient Medications   Medication Sig Dispense Refill    amLODIPine (NORVASC) 10 MG tablet TAKE 1 TABLET DAILY 90 tablet 3    aspirin 81 MG EC tablet Take 1 tablet by mouth Daily.      atorvastatin (LIPITOR) 40 MG tablet TAKE 1 TABLET DAILY 90 tablet 1    famotidine (Pepcid) 20 MG tablet Take 1 tablet by mouth 2 (Two) Times a Day As Needed for Heartburn. 180 tablet 3    glipizide (GLUCOTROL XL) 5 MG ER tablet Take 1 tablet by mouth Daily. 180 tablet 3    metFORMIN ER (GLUCOPHAGE-XR) 500 MG 24 hr tablet Take 1 tablet by mouth 2 (Two) Times a Day. 180 tablet 3    metoprolol tartrate (LOPRESSOR) 25 MG tablet TAKE 1 TABLET TWICE A  tablet 3    metoprolol tartrate (LOPRESSOR) 50 MG tablet TAKE 1 TABLET TWICE A  tablet 3    multivitamin with minerals tablet tablet Take 1 tablet by mouth Daily.      nitroglycerin (NITROSTAT) 0.4 MG SL tablet Place 1 tablet under the tongue Every 5 (Five) Minutes As Needed for Chest Pain. 30 tablet 2    pantoprazole (PROTONIX) 40 MG EC tablet TAKE 1 TABLET DAILY 90 tablet 3    pioglitazone (Actos) 15 MG tablet Take 1 tablet by mouth Daily. 90 tablet 3     No current facility-administered medications for this visit.     Review of Systems   Constitutional:  Negative for activity change, appetite change, fatigue, fever, unexpected weight gain and unexpected weight loss.   HENT:  Negative for nosebleeds, rhinorrhea, trouble swallowing and voice change.    Eyes:  Negative for visual disturbance.   Respiratory:  Negative for cough, chest tightness, shortness of breath and wheezing.    Cardiovascular:  Negative for chest pain, palpitations and leg swelling.   Gastrointestinal:  Negative for abdominal pain, blood in stool, constipation, diarrhea, nausea, vomiting, GERD and indigestion.   Genitourinary:  Negative for dysuria, frequency and hematuria.   Musculoskeletal:  Negative for arthralgias, back  "pain and myalgias.   Skin:  Negative for rash and wound.   Neurological:  Negative for dizziness, tremors, weakness, light-headedness, numbness, headache and memory problem.   Hematological:  Negative for adenopathy. Does not bruise/bleed easily.   Psychiatric/Behavioral:  Negative for sleep disturbance and depressed mood. The patient is not nervous/anxious.        Objective   /88 (BP Location: Left arm, Patient Position: Sitting, Cuff Size: Large Adult)   Pulse 63   Temp 97.3 °F (36.3 °C) (Temporal)   Ht 147.3 cm (57.99\")   Wt 99.1 kg (218 lb 6.4 oz)   LMP  (LMP Unknown)   SpO2 100%   BMI 45.66 kg/m²     Physical Exam  Vitals and nursing note reviewed.   Constitutional:       General: She is not in acute distress.     Appearance: She is well-developed. She is obese. She is not diaphoretic.   HENT:      Head: Normocephalic and atraumatic.      Right Ear: External ear normal.      Left Ear: External ear normal.      Nose: Nose normal.   Eyes:      Conjunctiva/sclera: Conjunctivae normal.      Pupils: Pupils are equal, round, and reactive to light.   Neck:      Thyroid: No thyromegaly.      Trachea: No tracheal deviation.   Cardiovascular:      Rate and Rhythm: Normal rate and regular rhythm.      Heart sounds: Normal heart sounds. No murmur heard.     No friction rub. No gallop.   Pulmonary:      Effort: Pulmonary effort is normal. No respiratory distress.      Breath sounds: Normal breath sounds.   Abdominal:      General: Bowel sounds are normal.      Palpations: Abdomen is soft. There is no mass.      Tenderness: There is no abdominal tenderness. There is no guarding.   Musculoskeletal:         General: Normal range of motion.      Cervical back: Normal range of motion and neck supple.   Lymphadenopathy:      Cervical: No cervical adenopathy.   Skin:     General: Skin is warm and dry.      Capillary Refill: Capillary refill takes less than 2 seconds.      Findings: No rash.   Neurological:      Mental " Status: She is alert and oriented to person, place, and time.      Motor: No abnormal muscle tone.      Deep Tendon Reflexes: Reflexes normal.   Psychiatric:         Behavior: Behavior normal.         Thought Content: Thought content normal.         Judgment: Judgment normal.     No results found for this or any previous visit (from the past 2016 hour(s)).  Assessment & Plan   Diagnoses and all orders for this visit:    1. Type 2 diabetes mellitus with other circulatory complication, without long-term current use of insulin (Primary)  -     Comprehensive Metabolic Panel  -     Hemoglobin A1c  -     Microalbumin / Creatinine Urine Ratio - Urine, Clean Catch  -     Lipid Panel    2. Primary hypertension  -     Comprehensive Metabolic Panel  -     Lipid Panel    3. Hyperlipidemia, unspecified hyperlipidemia type  -     Comprehensive Metabolic Panel  -     Lipid Panel      Uncontrolled type 2 diabetes.  Will check the lab including her A1c adjusting based upon the results.  Encouraged her to follow her diet and exercise plan.  Elevated blood pressure for her baseline.  She is under stress with her father at home.  If her blood pressure reads crisitna elevated on next follow-up then will add medication but not ACE or ARB as she has not tolerated resulting in anaphylaxis and swelling in the past.  Will consider beta-blocker at that time.         COVID-19 Precautions - Patient was compliant in wearing a mask. When I saw the patient, I used appropriate personal protective equipment (PPE) including mask and eye shield (standard procedure).  Additionally, I used gown and gloves if indicated.  Hand hygiene was completed before and after seeing the patient.  Dictated utilizing Dragon Dictation

## 2024-04-11 LAB
ALBUMIN SERPL-MCNC: 4.6 G/DL (ref 3.9–4.9)
ALBUMIN/CREAT UR: <7 MG/G CREAT (ref 0–29)
ALBUMIN/GLOB SERPL: 1.7 {RATIO} (ref 1.2–2.2)
ALP SERPL-CCNC: 90 IU/L (ref 44–121)
ALT SERPL-CCNC: 19 IU/L (ref 0–32)
AST SERPL-CCNC: 18 IU/L (ref 0–40)
BILIRUB SERPL-MCNC: 0.3 MG/DL (ref 0–1.2)
BUN SERPL-MCNC: 15 MG/DL (ref 8–27)
BUN/CREAT SERPL: 17 (ref 12–28)
CALCIUM SERPL-MCNC: 10 MG/DL (ref 8.7–10.3)
CHLORIDE SERPL-SCNC: 104 MMOL/L (ref 96–106)
CHOLEST SERPL-MCNC: 153 MG/DL (ref 100–199)
CO2 SERPL-SCNC: 22 MMOL/L (ref 20–29)
CREAT SERPL-MCNC: 0.87 MG/DL (ref 0.57–1)
CREAT UR-MCNC: 41 MG/DL
EGFRCR SERPLBLD CKD-EPI 2021: 75 ML/MIN/1.73
GLOBULIN SER CALC-MCNC: 2.7 G/DL (ref 1.5–4.5)
GLUCOSE SERPL-MCNC: 83 MG/DL (ref 70–99)
HBA1C MFR BLD: 6.4 % (ref 4.8–5.6)
HDLC SERPL-MCNC: 70 MG/DL
LDLC SERPL CALC-MCNC: 69 MG/DL (ref 0–99)
MICROALBUMIN UR-MCNC: <3 UG/ML
POTASSIUM SERPL-SCNC: 4.1 MMOL/L (ref 3.5–5.2)
PROT SERPL-MCNC: 7.3 G/DL (ref 6–8.5)
SODIUM SERPL-SCNC: 143 MMOL/L (ref 134–144)
TRIGL SERPL-MCNC: 74 MG/DL (ref 0–149)
VLDLC SERPL CALC-MCNC: 14 MG/DL (ref 5–40)

## 2024-04-15 DIAGNOSIS — I10 ESSENTIAL HYPERTENSION: ICD-10-CM

## 2024-04-15 RX ORDER — METOPROLOL TARTRATE 50 MG/1
TABLET, FILM COATED ORAL
Qty: 180 TABLET | Refills: 0 | Status: SHIPPED | OUTPATIENT
Start: 2024-04-15

## 2024-05-19 DIAGNOSIS — K21.9 GASTROESOPHAGEAL REFLUX DISEASE WITHOUT ESOPHAGITIS: ICD-10-CM

## 2024-05-20 RX ORDER — FAMOTIDINE 20 MG/1
20 TABLET, FILM COATED ORAL 2 TIMES DAILY PRN
Qty: 180 TABLET | Refills: 0 | Status: SHIPPED | OUTPATIENT
Start: 2024-05-20

## 2024-07-12 DIAGNOSIS — I10 ESSENTIAL HYPERTENSION: ICD-10-CM

## 2024-07-12 RX ORDER — METOPROLOL TARTRATE 50 MG/1
TABLET, FILM COATED ORAL
Qty: 180 TABLET | Refills: 1 | Status: SHIPPED | OUTPATIENT
Start: 2024-07-12

## 2024-07-24 ENCOUNTER — OFFICE VISIT (OUTPATIENT)
Dept: FAMILY MEDICINE CLINIC | Facility: CLINIC | Age: 64
End: 2024-07-24
Payer: COMMERCIAL

## 2024-07-24 VITALS
HEART RATE: 62 BPM | HEIGHT: 58 IN | DIASTOLIC BLOOD PRESSURE: 76 MMHG | SYSTOLIC BLOOD PRESSURE: 124 MMHG | WEIGHT: 226.4 LBS | BODY MASS INDEX: 47.52 KG/M2 | OXYGEN SATURATION: 97 %

## 2024-07-24 DIAGNOSIS — E11.59 TYPE 2 DIABETES MELLITUS WITH OTHER CIRCULATORY COMPLICATION, WITHOUT LONG-TERM CURRENT USE OF INSULIN: Primary | ICD-10-CM

## 2024-07-24 DIAGNOSIS — E78.5 HYPERLIPIDEMIA, UNSPECIFIED HYPERLIPIDEMIA TYPE: ICD-10-CM

## 2024-07-24 DIAGNOSIS — I10 PRIMARY HYPERTENSION: ICD-10-CM

## 2024-07-24 PROCEDURE — 99214 OFFICE O/P EST MOD 30 MIN: CPT | Performed by: INTERNAL MEDICINE

## 2024-07-24 NOTE — PROGRESS NOTES
Subjective   Kimberli Magallanes is a 63 y.o. female.     Chief Complaint   Patient presents with    Diabetes    Hyperlipidemia    Hypertension    Weight Check     She wants to talk about her weight. She says she wants to know if any of her medicines can cause it.       History of Present Illness   History of hypertension.  Currently, has been feeling well and asymptomatic without any headaches,vision changes, cough, chest pain, shortness of breath, swelling, focal neurologic deficit, memory loss or syncope.  Has been taking the medications regularly and adherent with the regimen of amlodipine 10 mg, metoprolol 75 mg BID. Denies medication side effects and no significant interval events.       History of high cholesterol.  Currently, has been feeling well without any myalgias, muscle aches, weakness, numbness, chest pain, short of breath or other issues.  Currently, is adherent with medication regimen of atorvastatin 40 mg and denies medication side effects. Is due for lab follow-up. Last LDL of 69 on  4/10/24.     History of diabetes. In November 2023 developed severe dental infection and lost multiple teeth and developed uncontrolled BS and meds adjusted.   Patient states to have been compliant with medications and trying to exercise more.  Blood sugar monitoring - patient states has been running on average 120.  With a range of .  No episodes of hypoglycemia, nausea, vomiting, new rashes, syncope or other issues.  Currently, on glipizide XL 5 mg, pioglitazone 15 mg, and metformin XR 1000 mg twice a day.  Last A1c 4/10/24 of 6.4%, 12/7/23 of 11.3% and 2/14/2023 of 7.0%.    Has been having pain in the upper mid abdomen with exercise and eating.  Feels like a grabbing feeling.  Was seen by surgery Dr Black and did not do surgery but the pain has increased some.  Trying to do diet and exercise to loose weight but not successful.    The following portions of the patient's history were reviewed and updated as  appropriate: allergies, current medications, past family history, past medical history, past social history, past surgical history and problem list.    Depression Screen:      12/7/2023     4:00 PM   PHQ-2/PHQ-9 Depression Screening   Little Interest or Pleasure in Doing Things 0-->not at all   Feeling Down, Depressed or Hopeless 0-->not at all   PHQ-9: Brief Depression Severity Measure Score 0       Past Medical History:   Diagnosis Date    Abnormal electrocardiogram     Adverse reaction to ACE inhibitor drug     Adverse reaction to angiotensin 2 receptor antagonist     BMI 40.0-44.9, adult     Coronary artery disease 01/15/2020    Stent was placed.    Diabetes mellitus     Diet Controlled    Encounter for annual health examination     Encounter for hepatitis C screening test for low risk patient     Esophageal reflux     Foot deformity, acquired, right     Glucosuria     Hematuria, microscopic     Hyperlipidemia     Hypertension     Refused influenza vaccine     Visit for screening mammogram        Past Surgical History:   Procedure Laterality Date    BREAST BIOPSY Left 02/14/2019    benign calcification    CARDIAC CATHETERIZATION      x1 stent LAD at [facility]    COLONOSCOPY  approx 2016    negative per pt     CORONARY STENT PLACEMENT  01/15/2020    ENDOSCOPY  2021    Dr. Martínez       Family History   Problem Relation Age of Onset    Heart attack Mother 67    Stroke Mother     Hypertension Mother     Heart disease Mother 67    Arthritis Mother     Depression Mother     Diabetes Father     Hypertension Father     Heart disease Father 58    Alcohol abuse Father     Heart attack Maternal Grandmother     Cancer Maternal Grandmother     No Known Problems Other     Breast cancer Neg Hx        Social History     Socioeconomic History    Marital status:    Tobacco Use    Smoking status: Former     Current packs/day: 0.00     Average packs/day: 0.3 packs/day for 32.4 years (8.1 ttl pk-yrs)     Types:  Cigarettes     Start date: 1979     Quit date: 2012     Years since quittin.5     Passive exposure: Past    Smokeless tobacco: Never    Tobacco comments:     I quite in , then started agin, then quite but finally stopped in .   Vaping Use    Vaping status: Never Used   Substance and Sexual Activity    Alcohol use: Yes     Alcohol/week: 0.0 - 2.0 standard drinks of alcohol     Comment: social    Drug use: No    Sexual activity: Not Currently     Birth control/protection: Post-menopausal       Current Outpatient Medications   Medication Sig Dispense Refill    amLODIPine (NORVASC) 10 MG tablet TAKE 1 TABLET DAILY 90 tablet 3    aspirin 81 MG EC tablet Take 1 tablet by mouth Daily.      atorvastatin (LIPITOR) 40 MG tablet TAKE 1 TABLET DAILY 90 tablet 1    famotidine (Pepcid) 20 MG tablet Take 1 tablet by mouth 2 (Two) Times a Day As Needed for Heartburn. 180 tablet 0    glipizide (GLUCOTROL XL) 5 MG ER tablet Take 1 tablet by mouth Daily. 180 tablet 3    metFORMIN ER (GLUCOPHAGE-XR) 500 MG 24 hr tablet Take 1 tablet by mouth 2 (Two) Times a Day. 180 tablet 3    metoprolol tartrate (LOPRESSOR) 25 MG tablet TAKE 1 TABLET TWICE A  tablet 1    metoprolol tartrate (LOPRESSOR) 50 MG tablet TAKE 1 TABLET TWICE A  tablet 1    multivitamin with minerals tablet tablet Take 1 tablet by mouth Daily.      nitroglycerin (NITROSTAT) 0.4 MG SL tablet Place 1 tablet under the tongue Every 5 (Five) Minutes As Needed for Chest Pain. 30 tablet 2    pantoprazole (PROTONIX) 40 MG EC tablet TAKE 1 TABLET DAILY 90 tablet 3    pioglitazone (Actos) 15 MG tablet Take 1 tablet by mouth Daily. 90 tablet 3     No current facility-administered medications for this visit.       Review of Systems   Constitutional:  Positive for unexpected weight gain. Negative for activity change, appetite change, fatigue, fever and unexpected weight loss.   HENT:  Negative for nosebleeds, rhinorrhea, trouble swallowing and voice  "change.    Eyes:  Negative for visual disturbance.   Respiratory:  Negative for cough, chest tightness, shortness of breath and wheezing.    Cardiovascular:  Negative for chest pain, palpitations and leg swelling.   Gastrointestinal:  Negative for abdominal pain, blood in stool, constipation, diarrhea, nausea, vomiting, GERD and indigestion.   Genitourinary:  Negative for dysuria, frequency and hematuria.   Musculoskeletal:  Negative for arthralgias, back pain and myalgias.   Skin:  Negative for rash and wound.   Neurological:  Negative for dizziness, tremors, weakness, light-headedness, numbness, headache and memory problem.   Hematological:  Negative for adenopathy. Does not bruise/bleed easily.   Psychiatric/Behavioral:  Negative for sleep disturbance and depressed mood. The patient is not nervous/anxious.        Objective   /76 (BP Location: Left arm, Patient Position: Sitting, Cuff Size: Large Adult)   Pulse 62   Ht 147.3 cm (57.99\")   Wt 103 kg (226 lb 6.4 oz)   LMP  (LMP Unknown)   SpO2 97%   BMI 47.33 kg/m²     Physical Exam  Vitals and nursing note reviewed.   Constitutional:       General: She is not in acute distress.     Appearance: She is well-developed. She is obese. She is not diaphoretic.   HENT:      Head: Normocephalic and atraumatic.      Right Ear: External ear normal.      Left Ear: External ear normal.      Nose: Nose normal.   Eyes:      Conjunctiva/sclera: Conjunctivae normal.      Pupils: Pupils are equal, round, and reactive to light.   Neck:      Thyroid: No thyromegaly.      Trachea: No tracheal deviation.   Cardiovascular:      Rate and Rhythm: Normal rate and regular rhythm.      Heart sounds: Normal heart sounds. No murmur heard.     No friction rub. No gallop.   Pulmonary:      Effort: Pulmonary effort is normal. No respiratory distress.      Breath sounds: Normal breath sounds.   Abdominal:      General: Bowel sounds are normal.      Palpations: Abdomen is soft. There is " no mass.      Tenderness: There is no abdominal tenderness. There is no guarding.   Musculoskeletal:         General: Normal range of motion.      Cervical back: Normal range of motion and neck supple.   Lymphadenopathy:      Cervical: No cervical adenopathy.   Skin:     General: Skin is warm and dry.      Capillary Refill: Capillary refill takes less than 2 seconds.      Findings: No rash.      Comments: Normal foot exam bilaterally.   Neurological:      Mental Status: She is alert and oriented to person, place, and time.      Motor: No abnormal muscle tone.      Deep Tendon Reflexes: Reflexes normal.   Psychiatric:         Behavior: Behavior normal.         Thought Content: Thought content normal.         Judgment: Judgment normal.         No results found for this or any previous visit (from the past 2016 hour(s)).  Assessment & Plan   Diagnoses and all orders for this visit:    1. Type 2 diabetes mellitus with other circulatory complication, without long-term current use of insulin (Primary)    2. Primary hypertension    3. Hyperlipidemia, unspecified hyperlipidemia type    4. BMI 40.0-44.9, adult        Hypertension, diabetes, and cholesterol previously well-controlled.  Noted that she is having weight issues and continued weight gain.  Discussed trying ozempic for both diabetes and weight but issues with the insurance.  Will see if covered and if is then will start and if not then will refer to the medical weight loss clinic.  Ideally if we are able to get the Ozempic started we would be able to discontinue the pioglitazone and likely glipizide while resulting in weight loss which will help with her other comorbidities.  Continue the current medication at this time until we find out if the insurance will cover the Ozempic.  If not then as discussed with the patient will refer to the medical weight loss clinic.         Dictated utilizing Dragon Dictation

## 2024-07-29 ENCOUNTER — TRANSCRIBE ORDERS (OUTPATIENT)
Dept: ADMINISTRATIVE | Facility: HOSPITAL | Age: 64
End: 2024-07-29
Payer: COMMERCIAL

## 2024-07-29 DIAGNOSIS — Z12.31 VISIT FOR SCREENING MAMMOGRAM: Primary | ICD-10-CM

## 2024-08-07 ENCOUNTER — HOSPITAL ENCOUNTER (OUTPATIENT)
Dept: MAMMOGRAPHY | Facility: HOSPITAL | Age: 64
Discharge: HOME | End: 2024-08-07
Admitting: INTERNAL MEDICINE
Payer: COMMERCIAL

## 2024-08-07 DIAGNOSIS — Z12.31 VISIT FOR SCREENING MAMMOGRAM: ICD-10-CM

## 2024-08-07 PROCEDURE — 77063 BREAST TOMOSYNTHESIS BI: CPT

## 2024-08-07 PROCEDURE — 77067 SCR MAMMO BI INCL CAD: CPT

## 2024-08-12 DIAGNOSIS — K21.9 GASTROESOPHAGEAL REFLUX DISEASE WITHOUT ESOPHAGITIS: ICD-10-CM

## 2024-08-12 RX ORDER — FAMOTIDINE 20 MG/1
20 TABLET, FILM COATED ORAL 2 TIMES DAILY PRN
Qty: 180 TABLET | Refills: 0 | Status: SHIPPED | OUTPATIENT
Start: 2024-08-12

## 2024-09-16 DIAGNOSIS — E78.5 HYPERLIPIDEMIA, UNSPECIFIED HYPERLIPIDEMIA TYPE: ICD-10-CM

## 2024-09-16 RX ORDER — ATORVASTATIN CALCIUM 40 MG/1
40 TABLET, FILM COATED ORAL DAILY
Qty: 90 TABLET | Refills: 3 | Status: SHIPPED | OUTPATIENT
Start: 2024-09-16

## 2024-09-17 ENCOUNTER — OFFICE VISIT (OUTPATIENT)
Dept: OBSTETRICS AND GYNECOLOGY | Age: 64
End: 2024-09-17
Payer: COMMERCIAL

## 2024-09-17 VITALS
BODY MASS INDEX: 45.76 KG/M2 | SYSTOLIC BLOOD PRESSURE: 124 MMHG | DIASTOLIC BLOOD PRESSURE: 84 MMHG | HEIGHT: 59 IN | WEIGHT: 227 LBS

## 2024-09-17 DIAGNOSIS — Z01.419 WELL WOMAN EXAM WITH ROUTINE GYNECOLOGICAL EXAM: Primary | ICD-10-CM

## 2024-09-17 DIAGNOSIS — Z11.51 SCREENING FOR HPV (HUMAN PAPILLOMAVIRUS): ICD-10-CM

## 2024-09-17 DIAGNOSIS — Z12.4 ENCOUNTER FOR PAPANICOLAOU SMEAR FOR CERVICAL CANCER SCREENING: ICD-10-CM

## 2024-09-17 PROCEDURE — 99396 PREV VISIT EST AGE 40-64: CPT | Performed by: PHYSICIAN ASSISTANT

## 2024-09-17 RX ORDER — MULTIVITAMIN WITH IRON
TABLET ORAL
COMMUNITY

## 2024-09-18 NOTE — PROGRESS NOTES
Subjective   Kimberli Magallanes is a 62 y.o. female.     Chief Complaint   Patient presents with   • Diabetes       Diabetes  She has type 2 diabetes mellitus. No MedicAlert identification noted. The initial diagnosis of diabetes was made 6 Years ago. Pertinent negatives for hypoglycemia include no dizziness, nervousness/anxiousness or tremors. Pertinent negatives for diabetes include no chest pain, no fatigue, no weakness and no weight loss. Current diabetic treatment includes oral agent (monotherapy). She is compliant with treatment all of the time. Meal planning includes carbohydrate counting. She has not had a previous visit with a dietitian. She participates in exercise three times a week. She monitors blood glucose at home 3-4 x per day. Blood glucose monitoring compliance is excellent. Her breakfast blood glucose is taken after 10 am. Her breakfast blood glucose range is generally 110-130 mg/dl. Her lunch blood glucose is taken between 1-2 pm. Her lunch blood glucose range is generally 130-140 mg/dl. Her dinner blood glucose is taken between 5-6 pm. Her dinner blood glucose range is generally 110-130 mg/dl. Her highest blood glucose is 140-180 mg/dl. Her overall blood glucose range is 110-130 mg/dl. She does not see a podiatrist.Eye exam is current.      Answers for HPI/ROS submitted by the patient on 2/10/2023  What is the primary reason for your visit?: Diabetes    Follow-up for hypertension.  Currently, has been feeling well and asymptomatic without any headaches,vision changes, cough, chest pain, shortness of breath, swelling, focal neurologic deficit, memory loss or syncope.  Has been taking the medications regularly and adherent with the regimen of amlodipine 10 mg, metoprolol 75 mg BID. Denies medication side effects and no significant interval events.       Follow-up for cholesterol.  Currently, has been feeling well without any myalgias, muscle aches, weakness, numbness, chest pain, short of breath or other  [FreeTextEntry1] : She is a 89-year-old woman with a history of hypertension. She was having neuropathy in her feet. MRI done in June of 2019 showed a mass at T8. Surgical intervention was performed in July 2019. She was found to have a meningioma. She did well and resumed her normal activity except she still has a neuropathy in her feet. Fell at home in April 2022. Waited several days before having a medical evaluation. Went to [facility]. S/P ORIF 4/23/22.   Impression Hypertension -Not taking any medication at this point Pneumonia, July 2024 -Appears to have recovered fully  Recommend Obtain blood work Chest radiograph Influenza vaccination, high-dose, given in the left arm Resume blood pressure medication after the blood work results are available Discussed with her daughter in law, Janiya Follow-up in 6 months issues.  Currently, is adherent with medication regimen of atorvastatin 40 mg and denies medication side effects. Is due for lab follow-up. Last LDL of 75 on 4/20/22.     Here for follow-up of diabetes.  Patient states to have been compliant with medications and trying to exercise more.  Blood sugar monitoring - patient states has been running on average 115.  With a range of .  No episodes of hypoglycemia, nausea, vomiting, new rashes, syncope or other issues.  Currently, on metformin XR 1000 mg twice a day.  Last A1c 4/20/22 6.8%.    The following portions of the patient's history were reviewed and updated as appropriate: allergies, current medications, past family history, past medical history, past social history, past surgical history and problem list.    Depression Screen:  PHQ-2/PHQ-9 Depression Screening 4/20/2022   Little Interest or Pleasure in Doing Things 1-->several days   Feeling Down, Depressed or Hopeless 0-->not at all   Trouble Falling or Staying Asleep, or Sleeping Too Much 0-->not at all   Feeling Tired or Having Little Energy 0-->not at all   Poor Appetite or Overeating 0-->not at all   Feeling Bad about Yourself - or that You are a Failure or Have Let Yourself or Your Family Down 0-->not at all   Trouble Concentrating on Things, Such as Reading the Newspaper or Watching Television 0-->not at all   Moving or Speaking So Slowly that Other People Could Have Noticed? Or the Opposite - Being So Fidgety 0-->not at all   Thoughts that You Would be Better Off Dead or of Hurting Yourself in Some Way 0-->not at all   PHQ-9: Brief Depression Severity Measure Score 1   If You Checked Off Any Problems, How Difficult Have These Problems Made It For You to Do Your Work, Take Care of Things at Home, or Get Along with Other People? not difficult at all       Past Medical History:   Diagnosis Date   • Abnormal electrocardiogram    • Adverse reaction to ACE inhibitor drug    • Adverse reaction to  angiotensin 2 receptor antagonist    • BMI 40.0-44.9, adult (HCC)    • Coronary artery disease 01/15/2020    Stent was placed.   • Diabetes mellitus (HCC)     Diet Controlled   • Encounter for annual health examination    • Encounter for hepatitis C screening test for low risk patient    • Esophageal reflux    • Foot deformity, acquired, right    • Glucosuria    • Hematuria, microscopic    • Hyperlipidemia    • Hypertension    • Refused influenza vaccine    • Visit for screening mammogram        Past Surgical History:   Procedure Laterality Date   • BREAST BIOPSY Left 2019    benign calcification   • CARDIAC CATHETERIZATION      x1 stent [facility]AD [facility]   • COLONOSCOPY  approx     negative per pt    • CORONARY STENT PLACEMENT  01/15/2020   • ENDOSCOPY      Dr. Martínez       Family History   Problem Relation Age of Onset   • Heart attack Mother 67   • Stroke Mother    • Hypertension Mother    • Heart disease Mother    • Arthritis Mother    • Depression Mother    • Diabetes Father    • Hypertension Father    • Heart disease Father    • Alcohol abuse Father    • Heart attack Maternal Grandmother    • Cancer Maternal Grandmother    • No Known Problems Other    • Breast cancer Neg Hx        Social History     Socioeconomic History   • Marital status:    Tobacco Use   • Smoking status: Former     Packs/day: 0.25     Years: 10.00     Pack years: 2.50     Types: Cigarettes     Start date: 1979     Quit date: 2012     Years since quittin.1   • Smokeless tobacco: Never   • Tobacco comments:     I quite in , then started agin, then quite but finally stopped in .   Vaping Use   • Vaping Use: Never used   Substance and Sexual Activity   • Alcohol use: Yes     Alcohol/week: 0.0 - 2.0 standard drinks     Comment: social   • Drug use: No   • Sexual activity: Not Currently     Birth control/protection: Post-menopausal       Current Outpatient Medications   Medication Sig Dispense  Refill   • amLODIPine (NORVASC) 10 MG tablet Take 1 tablet by mouth Daily. 90 tablet 3   • ASPIRIN LOW DOSE 81 MG chewable tablet Chew 1 tablet Daily. 90 tablet 0   • atorvastatin (LIPITOR) 40 MG tablet Take 1 tablet by mouth Daily. 90 tablet 3   • diclofenac (VOLTAREN) 1 % gel gel Apply 4 g topically to the appropriate area as directed 4 (Four) Times a Day As Needed (pain). 150 g 2   • metFORMIN ER (GLUCOPHAGE-XR) 500 MG 24 hr tablet Take 2 tablets by mouth 2 (Two) Times a Day. 360 tablet 1   • metoprolol tartrate (LOPRESSOR) 25 MG tablet Take 1 tablet by mouth 2 (Two) Times a Day. 180 tablet 2   • metoprolol tartrate (LOPRESSOR) 50 MG tablet Take 1 tablet by mouth 2 (Two) Times a Day. 180 tablet 2   • multivitamin with minerals tablet tablet Take 1 tablet by mouth Daily.     • nitroglycerin (NITROSTAT) 0.4 MG SL tablet Place 1 tablet under the tongue Every 5 (Five) Minutes As Needed for Chest Pain. 30 tablet 2   • pantoprazole (PROTONIX) 40 MG EC tablet TAKE 1 TABLET DAILY 90 tablet 3     No current facility-administered medications for this visit.     Review of Systems   Constitutional: Negative for activity change, appetite change, fatigue, fever, unexpected weight gain and unexpected weight loss.   HENT: Negative for nosebleeds, rhinorrhea, trouble swallowing and voice change.    Eyes: Negative for visual disturbance.   Respiratory: Negative for cough, chest tightness, shortness of breath and wheezing.    Cardiovascular: Negative for chest pain, palpitations and leg swelling.   Gastrointestinal: Positive for diarrhea. Negative for abdominal pain, blood in stool, constipation, nausea, vomiting, GERD and indigestion.   Genitourinary: Negative for dysuria, frequency and hematuria.   Musculoskeletal: Negative for arthralgias, back pain and myalgias.   Skin: Negative for rash and wound.   Neurological: Negative for dizziness, tremors, weakness, light-headedness, numbness, headache and memory problem.  "  Hematological: Negative for adenopathy. Does not bruise/bleed easily.   Psychiatric/Behavioral: Negative for sleep disturbance and depressed mood. The patient is not nervous/anxious.      Objective   /80 (BP Location: Left arm, Patient Position: Sitting, Cuff Size: Adult)   Pulse 65   Temp 97.8 °F (36.6 °C)   Ht 149.9 cm (59.02\")   Wt 96.6 kg (213 lb)   LMP  (LMP Unknown)   SpO2 99%   BMI 43.00 kg/m²     Physical Exam  Vitals and nursing note reviewed.   Constitutional:       General: She is not in acute distress.     Appearance: She is well-developed. She is not diaphoretic.   HENT:      Head: Normocephalic and atraumatic.      Right Ear: External ear normal.      Left Ear: External ear normal.      Nose: Nose normal.   Eyes:      Conjunctiva/sclera: Conjunctivae normal.      Pupils: Pupils are equal, round, and reactive to light.   Neck:      Thyroid: No thyromegaly.      Trachea: No tracheal deviation.   Cardiovascular:      Rate and Rhythm: Normal rate and regular rhythm.      Heart sounds: Normal heart sounds. No murmur heard.    No friction rub. No gallop.   Pulmonary:      Effort: Pulmonary effort is normal. No respiratory distress.      Breath sounds: Normal breath sounds.   Abdominal:      General: Bowel sounds are normal.      Palpations: Abdomen is soft. There is no mass.      Tenderness: There is no abdominal tenderness. There is no guarding.   Musculoskeletal:         General: Normal range of motion.      Cervical back: Normal range of motion and neck supple.   Lymphadenopathy:      Cervical: No cervical adenopathy.   Skin:     General: Skin is warm and dry.      Capillary Refill: Capillary refill takes less than 2 seconds.      Findings: No rash.      Comments: Normal foot exam with no decreased sensation.   Neurological:      Mental Status: She is alert and oriented to person, place, and time.      Motor: No abnormal muscle tone.      Deep Tendon Reflexes: Reflexes normal. "   Psychiatric:         Behavior: Behavior normal.         Thought Content: Thought content normal.         Judgment: Judgment normal.       Recent Results (from the past 2016 hour(s))   POCT urinalysis dipstick, automated    Collection Time: 11/30/22  8:31 AM    Specimen: Urine   Result Value Ref Range    Color Yellow Yellow, Straw, Dark Yellow, Betsy    Clarity, UA Clear Clear    Specific Gravity  1.010 1.005 - 1.030    pH, Urine 6.0 5.0 - 8.0    Leukocytes Negative Negative    Nitrite, UA Negative Negative    Protein, POC Negative Negative mg/dL    Glucose, UA Negative Negative mg/dL    Ketones, UA Negative Negative    Urobilinogen, UA Normal Normal, 0.2 E.U./dL    Bilirubin Negative Negative    Blood, UA Trace (A) Negative    Lot Number [id number]     Expiration Date 10/21/2023    Urine Culture - Urine, Urine, Clean Catch    Collection Time: 11/30/22  9:17 AM    Specimen: Urine, Clean Catch    UR   Result Value Ref Range    Urine Culture Final report     Result 1 Comment      Assessment & Plan   Diagnoses and all orders for this visit:    1. Type 2 diabetes mellitus with other circulatory complication, without long-term current use of insulin (HCC) (Primary)    2. Hyperlipidemia, unspecified hyperlipidemia type  -     atorvastatin (LIPITOR) 40 MG tablet; Take 1 tablet by mouth Daily.  Dispense: 90 tablet; Refill: 3    3. Essential hypertension  -     amLODIPine (NORVASC) 10 MG tablet; Take 1 tablet by mouth Daily.  Dispense: 90 tablet; Refill: 3    Hypertension is well controlled.  We will check the labs as noted and done earlier today.  We will evaluate the A1c and adjust her medications based upon results for diabetes.  Currently appears to be doing well and stable we will continue the current medications.  We discussed her issues with intermittent upset stomach and reflux symptoms.  It does not appear to be related to gall bladder I am not sure how much and ultrasound is really going to help.  Past CTs  been negative.  I would recommend continuing the current medications and observing.           · COVID-19 Precautions - Patient was compliant in wearing a mask. When I saw the patient, I used appropriate personal protective equipment (PPE) including mask and eye shield (standard procedure).  Additionally, I used gown and gloves if indicated.  Hand hygiene was completed before and after seeing the patient.  · Dictated utilizing Dragon Dictation

## 2024-09-20 LAB
CYTOLOGIST CVX/VAG CYTO: NORMAL
CYTOLOGY CVX/VAG DOC CYTO: NORMAL
CYTOLOGY CVX/VAG DOC THIN PREP: NORMAL
DX ICD CODE: NORMAL
HPV GENOTYPE REFLEX: NORMAL
HPV I/H RISK 4 DNA CVX QL PROBE+SIG AMP: NEGATIVE
Lab: NORMAL
OTHER STN SPEC: NORMAL
STAT OF ADQ CVX/VAG CYTO-IMP: NORMAL

## 2024-11-12 ENCOUNTER — OFFICE VISIT (OUTPATIENT)
Dept: FAMILY MEDICINE CLINIC | Facility: CLINIC | Age: 64
End: 2024-11-12
Payer: COMMERCIAL

## 2024-11-12 VITALS
OXYGEN SATURATION: 98 % | BODY MASS INDEX: 46.16 KG/M2 | TEMPERATURE: 98 F | HEIGHT: 59 IN | WEIGHT: 229 LBS | SYSTOLIC BLOOD PRESSURE: 132 MMHG | HEART RATE: 79 BPM | DIASTOLIC BLOOD PRESSURE: 88 MMHG

## 2024-11-12 DIAGNOSIS — E78.5 HYPERLIPIDEMIA, UNSPECIFIED HYPERLIPIDEMIA TYPE: ICD-10-CM

## 2024-11-12 DIAGNOSIS — K21.9 GASTROESOPHAGEAL REFLUX DISEASE WITHOUT ESOPHAGITIS: ICD-10-CM

## 2024-11-12 DIAGNOSIS — E11.59 TYPE 2 DIABETES MELLITUS WITH OTHER CIRCULATORY COMPLICATION, WITHOUT LONG-TERM CURRENT USE OF INSULIN: Primary | ICD-10-CM

## 2024-11-12 DIAGNOSIS — I10 PRIMARY HYPERTENSION: ICD-10-CM

## 2024-11-12 PROCEDURE — 99396 PREV VISIT EST AGE 40-64: CPT | Performed by: INTERNAL MEDICINE

## 2024-11-12 RX ORDER — FAMOTIDINE 20 MG/1
20 TABLET, FILM COATED ORAL 2 TIMES DAILY PRN
Qty: 180 TABLET | Refills: 1 | Status: SHIPPED | OUTPATIENT
Start: 2024-11-12

## 2024-11-12 RX ORDER — DAPAGLIFLOZIN 5 MG/1
5 TABLET, FILM COATED ORAL DAILY
Qty: 90 TABLET | Refills: 1 | Status: SHIPPED | OUTPATIENT
Start: 2024-11-12

## 2024-11-12 RX ORDER — FAMOTIDINE 20 MG/1
20 TABLET, FILM COATED ORAL 2 TIMES DAILY PRN
Qty: 180 TABLET | Refills: 1 | Status: SHIPPED | OUTPATIENT
Start: 2024-11-12 | End: 2024-11-12 | Stop reason: SDUPTHER

## 2024-11-12 NOTE — PROGRESS NOTES
Chief Complaint   Patient presents with    Annual Exam       HPI:  Kimberli Magallanes, -1960, is a 63 y.o. female who presents for an annual physical.  63 year old female with diabetes, hypertension, hyperlipidemia, CAD s/p NSTEMI with subsequent PTCA, hiatal hernia, and GERD.    History of hypertension.  Currently, has been feeling well and asymptomatic without any headaches,vision changes, cough, chest pain, shortness of breath, swelling, focal neurologic deficit, memory loss or syncope.  Has been taking the medications regularly and adherent with the regimen of amlodipine 10 mg, metoprolol 75 mg BID. Denies medication side effects and no significant interval events.       History of high cholesterol.  Currently, has been feeling well without any myalgias, muscle aches, weakness, numbness, chest pain, short of breath or other issues.  Currently, is adherent with medication regimen of atorvastatin 40 mg and denies medication side effects. Is due for lab follow-up. Last LDL of 69 on  4/10/24.     History of diabetes.   Patient states to have been compliant with medications and trying to exercise more.  Blood sugar monitoring - patient states has been running on average 120.  With a range of  the last 7 days with her new glucometer but was down to 60's nearly daily in the afternoon and felt light headed and near black out (drinks OJ and improves but then GERD issues).  No episodes of nausea, vomiting, new rashes, syncope or other issues.  Currently, on glipizide XL 5 mg, pioglitazone 15 mg, and metformin XR 1000 mg twice a day.  Last A1c 4/10/24 of 6.4%, 23 of 11.3% and 2023 of 7.0%.  Last microalbumin on 4/10/2024 and normal.    Recent Hospitalizations:  No hospitalization(s) within the last year..    Current Medical Providers:  Patient Care Team:  Brandin Khalil MD as PCP - General  Brandin Khalil MD as PCP - Family Medicine  Matthew Montes MD as Consulting Physician (Interventional  Cardiology)  Lex Morales MD as Consulting Physician (Cardiology)  Pamela Bland PA as Physician Assistant (Obstetrics and Gynecology)  Juve Saul MD as Obstetrician (Obstetrics and Gynecology)    Compared to one year ago, the patient feels her physical health is the same and her mental health is the same.    Depression Screen:      11/12/2024     2:46 PM   PHQ-2/PHQ-9 Depression Screening   Little interest or pleasure in doing things Not at all   Feeling down, depressed, or hopeless Not at all     Past Medical/Family/Social History:  The following portions of the patient's history were reviewed and updated as appropriate: allergies, current medications, past family history, past medical history, past social history, past surgical history, and problem list.    Allergies   Allergen Reactions    Lisinopril Anaphylaxis and Swelling    Losartan Anaphylaxis and Swelling    Citrullus Vulgaris Unknown - Low Severity    Egg-Derived Products Unknown - Low Severity       Current Outpatient Medications:     amLODIPine (NORVASC) 10 MG tablet, TAKE 1 TABLET DAILY, Disp: 90 tablet, Rfl: 3    aspirin 81 MG EC tablet, Take 1 tablet by mouth Daily., Disp: , Rfl:     atorvastatin (LIPITOR) 40 MG tablet, TAKE 1 TABLET DAILY, Disp: 90 tablet, Rfl: 3    famotidine (PEPCID) 20 MG tablet, Take 1 tablet by mouth 2 (Two) Times a Day As Needed for Heartburn., Disp: 180 tablet, Rfl: 1    Magnesium 250 MG tablet, Take  by mouth., Disp: , Rfl:     metFORMIN ER (GLUCOPHAGE-XR) 500 MG 24 hr tablet, Take 1 tablet by mouth 2 (Two) Times a Day., Disp: 180 tablet, Rfl: 3    metoprolol tartrate (LOPRESSOR) 25 MG tablet, TAKE 1 TABLET TWICE A DAY, Disp: 180 tablet, Rfl: 1    metoprolol tartrate (LOPRESSOR) 50 MG tablet, TAKE 1 TABLET TWICE A DAY, Disp: 180 tablet, Rfl: 1    multivitamin with minerals tablet tablet, Take 1 tablet by mouth Daily., Disp: , Rfl:     nitroglycerin (NITROSTAT) 0.4 MG SL tablet, Place 1 tablet under the  tongue Every 5 (Five) Minutes As Needed for Chest Pain., Disp: 30 tablet, Rfl: 2    pantoprazole (PROTONIX) 40 MG EC tablet, TAKE 1 TABLET DAILY, Disp: 90 tablet, Rfl: 3    pioglitazone (Actos) 15 MG tablet, Take 1 tablet by mouth Daily., Disp: 90 tablet, Rfl: 3    dapagliflozin (Farxiga) 5 MG tablet tablet, Take 1 tablet by mouth Daily., Disp: 90 tablet, Rfl: 1    Current medication list contains no high risk medications.  No harmful drug interactions have been identified.     Family History   Problem Relation Age of Onset    Heart attack Mother 67    Stroke Mother     Hypertension Mother     Heart disease Mother 67    Arthritis Mother     Depression Mother     Diabetes Father     Hypertension Father     Heart disease Father 58    Alcohol abuse Father     Heart attack Maternal Grandmother     Cancer Maternal Grandmother     No Known Problems Other     Breast cancer Neg Hx        Social History     Tobacco Use    Smoking status: Former     Current packs/day: 0.00     Average packs/day: 0.3 packs/day for 32.4 years (8.1 ttl pk-yrs)     Types: Cigarettes     Start date: 1979     Quit date: 2012     Years since quittin.8     Passive exposure: Past    Smokeless tobacco: Never    Tobacco comments:     I quite in , then started agin, then quite but finally stopped in .   Substance Use Topics    Alcohol use: Yes     Alcohol/week: 0.0 - 2.0 standard drinks of alcohol     Comment: social       Past Surgical History:   Procedure Laterality Date    BREAST BIOPSY Left 2019    benign calcification    CARDIAC CATHETERIZATION      x1 stent LAD at [facility]    COLONOSCOPY  approx 2016    negative per pt     CORONARY STENT PLACEMENT  01/15/2020    ENDOSCOPY      Dr. Martínez       Patient Active Problem List   Diagnosis    Type 2 diabetes mellitus    Hypertension    Hyperlipidemia    Esophageal reflux    Visit for screening mammogram    NSTEMI (non-ST elevated myocardial infarction)    S/P PTCA  "(percutaneous transluminal coronary angioplasty)    Atypical chest pain    Hospital discharge follow-up    Anxiety    Coronary artery disease involving native coronary artery of native heart with angina pectoris    Xyphoidalgia    Abdominal bloating    Encounter for annual health examination    Trigger middle finger of right hand    Body mass index (BMI) of 45.0 to 49.9 in adult    Hiatal hernia    Medication adverse effect, initial encounter       Review of Systems   Constitutional:  Negative for activity change, appetite change, fatigue, fever, unexpected weight gain and unexpected weight loss.   HENT:  Negative for nosebleeds, rhinorrhea, trouble swallowing and voice change.    Eyes:  Negative for visual disturbance.   Respiratory:  Negative for cough, chest tightness, shortness of breath and wheezing.    Cardiovascular:  Negative for chest pain, palpitations and leg swelling.   Gastrointestinal:  Negative for abdominal pain, blood in stool, constipation, diarrhea, nausea, vomiting, GERD and indigestion.   Genitourinary:  Negative for dysuria, frequency and hematuria.   Musculoskeletal:  Negative for arthralgias, back pain and myalgias.   Skin:  Negative for rash and wound.   Neurological:  Negative for dizziness, tremors, weakness, light-headedness, numbness, headache and memory problem.   Hematological:  Negative for adenopathy. Does not bruise/bleed easily.   Psychiatric/Behavioral:  Negative for sleep disturbance and depressed mood. The patient is not nervous/anxious.      Objective     Vitals:    11/12/24 1445 11/12/24 1523   BP: 170/90 132/88   BP Location: Left arm Right arm   Patient Position: Sitting Sitting   Cuff Size: Adult Large Adult   Pulse: 79    Temp: 98 °F (36.7 °C)    TempSrc: Temporal    SpO2: 98%    Weight: 104 kg (229 lb)    Height: 149.9 cm (59\")      Physical Exam  Vitals and nursing note reviewed.   Constitutional:       General: She is not in acute distress.     Appearance: She is " well-developed. She is not diaphoretic.   HENT:      Head: Normocephalic and atraumatic.      Right Ear: External ear normal.      Left Ear: External ear normal.      Nose: Nose normal.   Eyes:      Conjunctiva/sclera: Conjunctivae normal.      Pupils: Pupils are equal, round, and reactive to light.   Neck:      Thyroid: No thyromegaly.      Trachea: No tracheal deviation.   Cardiovascular:      Rate and Rhythm: Normal rate and regular rhythm.      Heart sounds: Normal heart sounds. No murmur heard.     No friction rub. No gallop.   Pulmonary:      Effort: Pulmonary effort is normal. No respiratory distress.      Breath sounds: Normal breath sounds.   Abdominal:      General: Bowel sounds are normal.      Palpations: Abdomen is soft. There is no mass.      Tenderness: There is no abdominal tenderness. There is no guarding.   Musculoskeletal:         General: Normal range of motion.      Cervical back: Normal range of motion and neck supple.   Lymphadenopathy:      Cervical: No cervical adenopathy.   Skin:     General: Skin is warm and dry.      Capillary Refill: Capillary refill takes less than 2 seconds.      Findings: No rash.   Neurological:      Mental Status: She is alert and oriented to person, place, and time.      Motor: No abnormal muscle tone.      Deep Tendon Reflexes: Reflexes normal.   Psychiatric:         Behavior: Behavior normal.         Thought Content: Thought content normal.         Judgment: Judgment normal.     Recent Lab Results:  Lab Results   Component Value Date    Glucose 83 04/10/2024    Glucose 208 (H) 11/28/2023    Glucose 362 (H) 11/28/2023    Glucose 101 (H) 01/16/2020    Glucose 118 (H) 01/16/2020    Glucose, UA Negative 11/30/2022     Lab Results   Component Value Date    Chol/HDL Ratio 2.32 06/05/2020    Chol/HDL Ratio 3.1 01/16/2020    Total Cholesterol 153 04/10/2024    Total Cholesterol 154 01/16/2020    Triglycerides 74 04/10/2024    Triglycerides 71 01/16/2020    HDL  Cholesterol 70 04/10/2024    HDL Cholesterol 50 (L) 01/16/2020    VLDL Cholesterol 17.4 06/05/2020    VLDL Cholesterol 14 01/16/2020    VLDL Cholesterol [place] 14 04/10/2024     Assessment & Plan   Age-appropriate Screening Schedule:  Refer to the list below for future screening recommendations based on patient's age, sex and/or medical conditions.      Health Maintenance   Topic Date Due    ANNUAL PHYSICAL  04/20/2023    DIABETIC EYE EXAM  09/23/2023    HEMOGLOBIN A1C  10/10/2024    BMI FOLLOWUP  12/07/2024    DIABETIC FOOT EXAM  04/10/2025    LIPID PANEL  04/10/2025    URINE MICROALBUMIN  04/10/2025    COLORECTAL CANCER SCREENING  05/27/2026    MAMMOGRAM  08/07/2026    PAP SMEAR  09/17/2027    TDAP/TD VACCINES (2 - Td or Tdap) 02/18/2028    HEPATITIS C SCREENING  Completed    COVID-19 Vaccine  Completed    Pneumococcal Vaccine 0-64  Completed    ZOSTER VACCINE  Completed    INFLUENZA VACCINE  Discontinued       Diagnoses and all orders for this visit:    1. Type 2 diabetes mellitus with other circulatory complication, without long-term current use of insulin (Primary)  -     Comprehensive Metabolic Panel  -     Hemoglobin A1c  -     Lipid Panel  -     dapagliflozin (Farxiga) 5 MG tablet tablet; Take 1 tablet by mouth Daily.  Dispense: 90 tablet; Refill: 1    2. Primary hypertension  -     Comprehensive Metabolic Panel  -     Lipid Panel    3. Hyperlipidemia, unspecified hyperlipidemia type  -     Comprehensive Metabolic Panel  -     Lipid Panel    4. Gastroesophageal reflux disease without esophagitis  -     famotidine (PEPCID) 20 MG tablet; Take 1 tablet by mouth 2 (Two) Times a Day As Needed for Heartburn.  Dispense: 180 tablet; Refill: 1    5. Body mass index (BMI) of 45.0 to 49.9 in adult    Annual wellness visit reviewed with patient.  All past history, medications, social history, and problem list were reviewed.  Discussed advanced directives and living will.  Patient has living will: Living will: no and  information packet given to patient to complete at home and bring copy to office.  Will check the labs as ordered above to evaluate the blood sugars, kidney, liver, cholesterol for screening.  Discussed flu shot recommended to get the influenza vaccine annually in the fall.  Shingrix and pneumonia vaccination series discussed.  Encouraged follow-up with the eye doctor on annual basis.  Discussed weight and encouraged exercise as tolerated while following a healthy diet.  Reviewed sexual health and safe sex practices.  Colon cancer screening discussed and current status: colonoscopy last completed 5/27/2016 with repeat after 10 years recommended.  Discussed female health and screening including Pap smear/ pelvic exam/ self breast exam/ mammogram.  Last mammogram completed 8/7/2024 repeat after 1 to 2 years recommended follow up with current specialists as needed.     An After Visit Summary with all of these plans were given to the patient.        Follow Up:  No follow-ups on file.

## 2024-11-12 NOTE — PROGRESS NOTES
Subjective   Kimberli Magallanes is a 63 y.o. female.     No chief complaint on file.      History of Present Illness   History of hypertension.  Currently, has been feeling well and asymptomatic without any headaches,vision changes, cough, chest pain, shortness of breath, swelling, focal neurologic deficit, memory loss or syncope.  Has been taking the medications regularly and adherent with the regimen of amlodipine 10 mg, metoprolol 75 mg BID. Denies medication side effects and no significant interval events.       History of high cholesterol.  Currently, has been feeling well without any myalgias, muscle aches, weakness, numbness, chest pain, short of breath or other issues.  Currently, is adherent with medication regimen of atorvastatin 40 mg and denies medication side effects. Is due for lab follow-up. Last LDL of 69 on  4/10/24.     History of diabetes. In November 2023 developed severe dental infection and lost multiple teeth and developed uncontrolled BS and meds adjusted.   Patient states to have been compliant with medications and trying to exercise more.  Blood sugar monitoring - patient states has been running on average 120.  With a range of .  No episodes of hypoglycemia, nausea, vomiting, new rashes, syncope or other issues.  Currently, on glipizide XL 5 mg, pioglitazone 15 mg, and metformin XR 1000 mg twice a day.  Last A1c 4/10/24 of 6.4%, 12/7/23 of 11.3% and 2/14/2023 of 7.0%.  Last microalbumin on 4/10/2024 and normal.    The following portions of the patient's history were reviewed and updated as appropriate: allergies, current medications, past family history, past medical history, past social history, past surgical history and problem list.    Depression Screen:      12/7/2023     4:00 PM   PHQ-2/PHQ-9 Depression Screening   Retired Little Interest or Pleasure in Doing Things 0-->not at all   Retired Feeling Down, Depressed or Hopeless 0-->not at all   Retired PHQ-9: Brief Depression Severity  Measure Score 0       Past Medical History:   Diagnosis Date    Abnormal electrocardiogram     Adverse reaction to ACE inhibitor drug     Adverse reaction to angiotensin 2 receptor antagonist     BMI 40.0-44.9, adult     Coronary artery disease 01/15/2020    Stent was placed.    Diabetes mellitus     Diet Controlled    Encounter for annual health examination     Encounter for hepatitis C screening test for low risk patient     Esophageal reflux     Foot deformity, acquired, right     Glucosuria     Hematuria, microscopic     Hyperlipidemia     Hypertension     Refused influenza vaccine     Visit for screening mammogram        Past Surgical History:   Procedure Laterality Date    BREAST BIOPSY Left 2019    benign calcification    CARDIAC CATHETERIZATION      x1 stent LAD at [facility]    COLONOSCOPY  approx 2016    negative per pt     CORONARY STENT PLACEMENT  01/15/2020    ENDOSCOPY      Dr. Martínez       Family History   Problem Relation Age of Onset    Heart attack Mother 67    Stroke Mother     Hypertension Mother     Heart disease Mother 67    Arthritis Mother     Depression Mother     Diabetes Father     Hypertension Father     Heart disease Father 58    Alcohol abuse Father     Heart attack Maternal Grandmother     Cancer Maternal Grandmother     No Known Problems Other     Breast cancer Neg Hx        Social History     Socioeconomic History    Marital status:    Tobacco Use    Smoking status: Former     Current packs/day: 0.00     Average packs/day: 0.3 packs/day for 32.4 years (8.1 ttl pk-yrs)     Types: Cigarettes     Start date: 1979     Quit date: 2012     Years since quittin.8     Passive exposure: Past    Smokeless tobacco: Never    Tobacco comments:     I quite in , then started agin, then quite but finally stopped in .   Vaping Use    Vaping status: Never Used   Substance and Sexual Activity    Alcohol use: Yes     Alcohol/week: 0.0 - 2.0 standard drinks of  alcohol     Comment: social    Drug use: No    Sexual activity: Not Currently     Birth control/protection: Post-menopausal       Current Outpatient Medications   Medication Sig Dispense Refill    amLODIPine (NORVASC) 10 MG tablet TAKE 1 TABLET DAILY 90 tablet 3    aspirin 81 MG EC tablet Take 1 tablet by mouth Daily.      atorvastatin (LIPITOR) 40 MG tablet TAKE 1 TABLET DAILY 90 tablet 3    famotidine (PEPCID) 20 MG tablet TAKE 1 TABLET BY MOUTH TWICE DAILY AS NEEDED FOR  HEARTBURN 180 tablet 0    glipizide (GLUCOTROL XL) 5 MG ER tablet Take 1 tablet by mouth Daily. 180 tablet 3    Magnesium 250 MG tablet Take  by mouth.      metFORMIN ER (GLUCOPHAGE-XR) 500 MG 24 hr tablet Take 1 tablet by mouth 2 (Two) Times a Day. 180 tablet 3    metoprolol tartrate (LOPRESSOR) 25 MG tablet TAKE 1 TABLET TWICE A  tablet 1    metoprolol tartrate (LOPRESSOR) 50 MG tablet TAKE 1 TABLET TWICE A  tablet 1    multivitamin with minerals tablet tablet Take 1 tablet by mouth Daily.      nitroglycerin (NITROSTAT) 0.4 MG SL tablet Place 1 tablet under the tongue Every 5 (Five) Minutes As Needed for Chest Pain. 30 tablet 2    pantoprazole (PROTONIX) 40 MG EC tablet TAKE 1 TABLET DAILY 90 tablet 3    pioglitazone (Actos) 15 MG tablet Take 1 tablet by mouth Daily. 90 tablet 3     No current facility-administered medications for this visit.       Review of Systems    Objective   LMP  (LMP Unknown)     Physical Exam    Recent Results (from the past 12 weeks)   IGP, Aptima HPV, Rfx 16 / 18,45    Collection Time: 09/17/24  4:40 PM    Specimen: Cervix; ThinPrep Vial    ThinPrep  Specimen katie   Result Value Ref Range    Diagnosis Comment     Specimen adequacy: Comment     Clinician Provided ICD-10: Comment     Performed by: Comment     . .     Note: Comment     Method: Comment     HPV Aptima Negative Negative    HPV Genotype Reflex Comment      Assessment & Plan   There are no diagnoses linked to this encounter.      {Time Spent  (Optional):20854}     Dictated utilizing Dragon Dictation

## 2024-11-13 LAB
ALBUMIN SERPL-MCNC: NORMAL G/DL
ALP SERPL-CCNC: NORMAL U/L
ALT SERPL-CCNC: NORMAL U/L
AST SERPL-CCNC: NORMAL U/L
BILIRUB SERPL-MCNC: NORMAL MG/DL
BUN SERPL-MCNC: NORMAL MG/DL
CALCIUM SERPL-MCNC: NORMAL MG/DL
CHLORIDE SERPL-SCNC: NORMAL MMOL/L
CHOLEST SERPL-MCNC: 158 MG/DL (ref 0–200)
CO2 SERPL-SCNC: NORMAL MMOL/L
CREAT SERPL-MCNC: NORMAL MG/DL
GLUCOSE SERPL-MCNC: NORMAL MG/DL
HBA1C MFR BLD: 6.3 % (ref 4.8–5.6)
HDLC SERPL-MCNC: 72 MG/DL (ref 40–60)
LDLC SERPL CALC-MCNC: 67 MG/DL (ref 0–100)
POTASSIUM SERPL-SCNC: NORMAL MMOL/L
PROT SERPL-MCNC: NORMAL G/DL
REQUEST PROBLEM: NORMAL
SODIUM SERPL-SCNC: NORMAL MMOL/L
TRIGL SERPL-MCNC: 104 MG/DL (ref 0–150)
VLDLC SERPL CALC-MCNC: 19 MG/DL (ref 5–40)

## 2024-11-18 ENCOUNTER — TELEPHONE (OUTPATIENT)
Dept: FAMILY MEDICINE CLINIC | Facility: CLINIC | Age: 64
End: 2024-11-18

## 2024-11-18 NOTE — TELEPHONE ENCOUNTER
Caller: Magallanes Kimberli    Relationship: Self    Best call back number: [phone] (Home)     Caller requesting test results: BLOOD WORK     What test was performed: BLOOD WORK     When was the test performed: 11/12/24    Where was the test performed:      Additional notes: PATIENT CALLED TO REQUEST A CALLBACK TO DISCUSS THE RESULTS FROM HER BLOOD WORK THAT DID NOT HAPPEN.       PLEASE CONTACT PATIENT TO ADVISE.          THANKS

## 2024-11-18 NOTE — TELEPHONE ENCOUNTER
Lvmtcb     Okay for  and hub to relay     What are patients questions? It looks like patient is just needing to come back and have labs redrawn

## 2024-11-25 ENCOUNTER — TELEPHONE (OUTPATIENT)
Dept: FAMILY MEDICINE CLINIC | Facility: CLINIC | Age: 64
End: 2024-11-25

## 2024-11-25 ENCOUNTER — TELEPHONE (OUTPATIENT)
Dept: FAMILY MEDICINE CLINIC | Facility: CLINIC | Age: 64
End: 2024-11-25
Payer: COMMERCIAL

## 2024-11-25 DIAGNOSIS — I10 PRIMARY HYPERTENSION: Primary | ICD-10-CM

## 2024-11-25 DIAGNOSIS — E78.5 HYPERLIPIDEMIA, UNSPECIFIED HYPERLIPIDEMIA TYPE: ICD-10-CM

## 2024-11-25 DIAGNOSIS — E11.59 TYPE 2 DIABETES MELLITUS WITH OTHER CIRCULATORY COMPLICATION, WITHOUT LONG-TERM CURRENT USE OF INSULIN: ICD-10-CM

## 2024-11-25 NOTE — TELEPHONE ENCOUNTER
[employer] called asking for lab orders (for appointment dated 12/03)to be put in so a estimate could be done for the patient.

## 2024-11-25 NOTE — TELEPHONE ENCOUNTER
Caller: Kimberli Magallanes    Relationship: Self    Best call back number: [phone] (Home)     What does billing need from the patient: PATIENT IS REQUESTING A CALLBACK TO DISCUSS A BILL THAT SHE RECEIVED A MESSAGE ON HER MY CHART ABOUT A BILL.    THANKS

## 2024-12-03 DIAGNOSIS — E11.59 TYPE 2 DIABETES MELLITUS WITH OTHER CIRCULATORY COMPLICATION, WITHOUT LONG-TERM CURRENT USE OF INSULIN: ICD-10-CM

## 2024-12-03 DIAGNOSIS — I10 PRIMARY HYPERTENSION: ICD-10-CM

## 2024-12-03 DIAGNOSIS — E78.5 HYPERLIPIDEMIA, UNSPECIFIED HYPERLIPIDEMIA TYPE: ICD-10-CM

## 2024-12-04 LAB
ALBUMIN SERPL-MCNC: 4.4 G/DL (ref 3.5–5.2)
ALBUMIN/GLOB SERPL: 1.6 G/DL
ALP SERPL-CCNC: 95 U/L (ref 39–117)
ALT SERPL-CCNC: 18 U/L (ref 1–33)
AST SERPL-CCNC: 18 U/L (ref 1–32)
BILIRUB SERPL-MCNC: 0.4 MG/DL (ref 0–1.2)
BUN SERPL-MCNC: 12 MG/DL (ref 8–23)
BUN/CREAT SERPL: 14 (ref 7–25)
CALCIUM SERPL-MCNC: 10 MG/DL (ref 8.6–10.5)
CHLORIDE SERPL-SCNC: 104 MMOL/L (ref 98–107)
CO2 SERPL-SCNC: 25.9 MMOL/L (ref 22–29)
CREAT SERPL-MCNC: 0.86 MG/DL (ref 0.57–1)
EGFRCR SERPLBLD CKD-EPI 2021: 76.5 ML/MIN/1.73
GLOBULIN SER CALC-MCNC: 2.8 GM/DL
GLUCOSE SERPL-MCNC: 140 MG/DL (ref 65–99)
POTASSIUM SERPL-SCNC: 4.8 MMOL/L (ref 3.5–5.2)
PROT SERPL-MCNC: 7.2 G/DL (ref 6–8.5)
SODIUM SERPL-SCNC: 140 MMOL/L (ref 136–145)

## 2025-01-01 DIAGNOSIS — E11.59 TYPE 2 DIABETES MELLITUS WITH OTHER CIRCULATORY COMPLICATION, WITHOUT LONG-TERM CURRENT USE OF INSULIN: ICD-10-CM

## 2025-01-02 RX ORDER — PIOGLITAZONE 15 MG/1
15 TABLET ORAL DAILY
Qty: 90 TABLET | Refills: 0 | Status: SHIPPED | OUTPATIENT
Start: 2025-01-02

## 2025-01-08 DIAGNOSIS — I10 ESSENTIAL HYPERTENSION: ICD-10-CM

## 2025-01-08 RX ORDER — METOPROLOL TARTRATE 50 MG
TABLET ORAL
Qty: 180 TABLET | Refills: 1 | Status: SHIPPED | OUTPATIENT
Start: 2025-01-08

## 2025-01-08 RX ORDER — METOPROLOL TARTRATE 25 MG/1
TABLET, FILM COATED ORAL
Qty: 180 TABLET | Refills: 1 | Status: SHIPPED | OUTPATIENT
Start: 2025-01-08

## 2025-01-15 DIAGNOSIS — I10 ESSENTIAL HYPERTENSION: ICD-10-CM

## 2025-01-15 DIAGNOSIS — E11.59 TYPE 2 DIABETES MELLITUS WITH OTHER CIRCULATORY COMPLICATION, WITHOUT LONG-TERM CURRENT USE OF INSULIN: ICD-10-CM

## 2025-01-15 RX ORDER — AMLODIPINE BESYLATE 10 MG/1
10 TABLET ORAL DAILY
Qty: 90 TABLET | Refills: 3 | Status: SHIPPED | OUTPATIENT
Start: 2025-01-15

## 2025-01-15 RX ORDER — METFORMIN HYDROCHLORIDE 500 MG/1
500 TABLET, EXTENDED RELEASE ORAL 2 TIMES DAILY
Qty: 180 TABLET | Refills: 3 | Status: SHIPPED | OUTPATIENT
Start: 2025-01-15

## 2025-01-29 DIAGNOSIS — K21.9 GASTROESOPHAGEAL REFLUX DISEASE WITHOUT ESOPHAGITIS: ICD-10-CM

## 2025-01-29 RX ORDER — PANTOPRAZOLE SODIUM 40 MG/1
TABLET, DELAYED RELEASE ORAL
Qty: 90 TABLET | Refills: 1 | Status: SHIPPED | OUTPATIENT
Start: 2025-01-29

## 2025-04-01 ENCOUNTER — OFFICE VISIT (OUTPATIENT)
Dept: CARDIOLOGY | Age: 65
End: 2025-04-01
Payer: COMMERCIAL

## 2025-04-01 VITALS
HEART RATE: 62 BPM | DIASTOLIC BLOOD PRESSURE: 80 MMHG | BODY MASS INDEX: 46.16 KG/M2 | SYSTOLIC BLOOD PRESSURE: 122 MMHG | WEIGHT: 229 LBS | HEIGHT: 59 IN

## 2025-04-01 DIAGNOSIS — E78.5 HYPERLIPIDEMIA, UNSPECIFIED HYPERLIPIDEMIA TYPE: ICD-10-CM

## 2025-04-01 DIAGNOSIS — E11.59 TYPE 2 DIABETES MELLITUS WITH OTHER CIRCULATORY COMPLICATION, WITHOUT LONG-TERM CURRENT USE OF INSULIN: ICD-10-CM

## 2025-04-01 DIAGNOSIS — I10 PRIMARY HYPERTENSION: ICD-10-CM

## 2025-04-01 DIAGNOSIS — I25.10 CORONARY ARTERY DISEASE INVOLVING NATIVE CORONARY ARTERY OF NATIVE HEART WITHOUT ANGINA PECTORIS: Primary | ICD-10-CM

## 2025-04-01 PROCEDURE — 93000 ELECTROCARDIOGRAM COMPLETE: CPT | Performed by: NURSE PRACTITIONER

## 2025-04-01 PROCEDURE — 99214 OFFICE O/P EST MOD 30 MIN: CPT | Performed by: NURSE PRACTITIONER

## 2025-04-01 RX ORDER — ATORVASTATIN CALCIUM 80 MG/1
80 TABLET, FILM COATED ORAL DAILY
Qty: 90 TABLET | Refills: 0 | Status: SHIPPED | OUTPATIENT
Start: 2025-04-01

## 2025-04-01 RX ORDER — GLIPIZIDE 5 MG/1
5 TABLET, FILM COATED, EXTENDED RELEASE ORAL DAILY
Qty: 90 TABLET | Refills: 3 | Status: SHIPPED | OUTPATIENT
Start: 2025-04-01

## 2025-04-01 RX ORDER — PIOGLITAZONE 15 MG/1
15 TABLET ORAL DAILY
Qty: 90 TABLET | Refills: 3 | Status: SHIPPED | OUTPATIENT
Start: 2025-04-01

## 2025-04-01 RX ORDER — GLIPIZIDE 5 MG/1
5 TABLET, FILM COATED, EXTENDED RELEASE ORAL DAILY
COMMUNITY
Start: 2025-01-01 | End: 2025-04-01 | Stop reason: SDUPTHER

## 2025-04-01 NOTE — TELEPHONE ENCOUNTER
Looks like this medication was discontinued in 11/2024    .LOV- 11/12/24  .NOV- n/a  .LF- 1/12/24    Please advise

## 2025-04-01 NOTE — PROGRESS NOTES
Date of Office Visit: 2025  Encounter Provider: INOCENTE Whitaker  Place of Service: [facility]  Patient Name: Kimberli Magallanes  :1960    Chief Complaint   Patient presents with    Coronary Artery Disease   :     HPI: Kimberli Magallanes is a 64 y.o. female patient of Dr. Morales's with hypertension and coronary artery disease.  In 2020, he had a non-STEMI and underwent CLIFTON of the first diagonal/ramus.    In 2022, she went into atrial flutter with 2:1 conduction.  She converted with IV metoprolol in the ER.  Anticoagulation was deferred, and she has not had any recurrence.    She was last seen in the office by Dr. Morales in 2024 at which time she was doing well.  No changes were made to her regimen, and she was advised follow-up in 1 year.    From a cardiac standpoint, she has been doing well.  She denies any chest pain, shortness of breath, palpitations, edema, dizziness, or syncope.  Her biggest complaint is the hiatal hernia which is growing increasingly bothersome.  She is also interested in weaning off some of her medications.    Past Medical History:   Diagnosis Date    Abnormal electrocardiogram     Adverse reaction to ACE inhibitor drug     Adverse reaction to angiotensin 2 receptor antagonist     BMI 40.0-44.9, adult     Coronary artery disease 01/15/2020    Stent was placed.    Diabetes mellitus     Diet Controlled    Encounter for annual health examination     Encounter for hepatitis C screening test for low risk patient     Esophageal reflux     Foot deformity, acquired, right     Glucosuria     Hematuria, microscopic     Hyperlipidemia     Hypertension     Refused influenza vaccine     Visit for screening mammogram        Past Surgical History:   Procedure Laterality Date    BREAST BIOPSY Left 2019    benign calcification    CARDIAC CATHETERIZATION      x1 stent LAD at [facility]    COLONOSCOPY  approx 2016    negative per  pt     CORONARY STENT PLACEMENT  01/15/2020    ENDOSCOPY      Dr. Martínez       Social History     Socioeconomic History    Marital status:    Tobacco Use    Smoking status: Former     Current packs/day: 0.00     Average packs/day: 0.3 packs/day for 32.4 years (8.1 ttl pk-yrs)     Types: Cigarettes     Start date: 1979     Quit date: 2012     Years since quittin.2     Passive exposure: Past    Smokeless tobacco: Never    Tobacco comments:     I quite in , then started agin, then quite but finally stopped in .   Vaping Use    Vaping status: Never Used   Substance and Sexual Activity    Alcohol use: Yes     Alcohol/week: 0.0 - 2.0 standard drinks of alcohol     Comment: social    Drug use: No    Sexual activity: Not Currently     Birth control/protection: Post-menopausal       Family History   Problem Relation Age of Onset    Heart attack Mother 67    Stroke Mother     Hypertension Mother     Heart disease Mother 67    Arthritis Mother     Depression Mother     Diabetes Father     Hypertension Father     Heart disease Father 58    Alcohol abuse Father     Heart attack Maternal Grandmother     Cancer Maternal Grandmother     No Known Problems Other     Breast cancer Neg Hx        Review of Systems   Constitutional: Negative.   Cardiovascular: Negative.  Negative for chest pain, dyspnea on exertion, leg swelling, orthopnea, paroxysmal nocturnal dyspnea and syncope.   Respiratory: Negative.     Hematologic/Lymphatic: Negative for bleeding problem.   Musculoskeletal:  Negative for falls.   Gastrointestinal:  Negative for melena.   Neurological:  Negative for dizziness and light-headedness.       Allergies   Allergen Reactions    Lisinopril Anaphylaxis and Swelling    Losartan Anaphylaxis and Swelling    Citrullus Vulgaris Unknown - Low Severity    Egg-Derived Products Unknown - Low Severity         Current Outpatient Medications:     amLODIPine (NORVASC) 10 MG tablet, TAKE 1 TABLET DAILY,  "Disp: 90 tablet, Rfl: 3    aspirin 81 MG EC tablet, Take 1 tablet by mouth Daily., Disp: , Rfl:     atorvastatin (LIPITOR) 80 MG tablet, Take 1 tablet by mouth Daily., Disp: 90 tablet, Rfl: 0    famotidine (PEPCID) 20 MG tablet, Take 1 tablet by mouth 2 (Two) Times a Day As Needed for Heartburn., Disp: 180 tablet, Rfl: 1    glipizide (GLUCOTROL XL) 5 MG ER tablet, Take 1 tablet by mouth Daily., Disp: , Rfl:     Magnesium 250 MG tablet, Take  by mouth., Disp: , Rfl:     metFORMIN ER (GLUCOPHAGE-XR) 500 MG 24 hr tablet, TAKE 1 TABLET TWICE A DAY, Disp: 180 tablet, Rfl: 3    metoprolol tartrate (LOPRESSOR) 50 MG tablet, TAKE 1 TABLET TWICE A DAY, Disp: 180 tablet, Rfl: 1    multivitamin with minerals tablet tablet, Take 1 tablet by mouth Daily., Disp: , Rfl:     nitroglycerin (NITROSTAT) 0.4 MG SL tablet, Place 1 tablet under the tongue Every 5 (Five) Minutes As Needed for Chest Pain., Disp: 30 tablet, Rfl: 2    pantoprazole (PROTONIX) 40 MG EC tablet, TAKE 1 TABLET DAILY, Disp: 90 tablet, Rfl: 1    pioglitazone (ACTOS) 15 MG tablet, TAKE 1 TABLET DAILY, Disp: 90 tablet, Rfl: 3      Objective:     Vitals:    04/01/25 1506   BP: 122/80   Pulse: 62   Weight: 104 kg (229 lb)   Height: 149.9 cm (59\")     Body mass index is 46.25 kg/m².    PHYSICAL EXAM:    Neck:      Vascular: No JVD.   Pulmonary:      Effort: Pulmonary effort is normal.      Breath sounds: Normal breath sounds.   Cardiovascular:      Normal rate. Regular rhythm.      Murmurs: There is no murmur.      No gallop.  No click. No rub.   Pulses:     Intact distal pulses.           ECG 12 Lead    Date/Time: 4/1/2025 3:20 PM  Performed by: Geovanna Beckwith APRN    Authorized by: Geovanna Beckwith APRN  Comparison: compared with previous ECG from 3/28/2024  Similar to previous ECG  Rhythm: sinus rhythm  Rate: normal  BPM: 62  Other findings: non-specific ST-T wave changes            Assessment:       Diagnosis Plan   1. Coronary artery disease involving " native coronary artery of native heart without angina pectoris  ECG 12 Lead      2. Primary hypertension        3. Hyperlipidemia, unspecified hyperlipidemia type  atorvastatin (LIPITOR) 80 MG tablet        Orders Placed This Encounter   Procedures    ECG 12 Lead     This order was created via procedure documentation     Release to patient:   Routine Release [[id number]]          Plan:       1.  Coronary artery disease.  She denies any symptoms of angina.  Continue aspirin and atorvastatin.      2.  Hypertension.  Her blood pressure is stable.  Continue current regimen.      3.  Hyperlipidemia.  Lipid panel from November demonstrated an LDL of 67 and HDL of 72.  I recommended increasing the atorvastatin to 80 mg to achieve an LDL less than 55.  We will repeat lipids in 3 months at which time I will also check lipid particles.      I think she is doing well.  I encouraged her to make another appointment with general surgery to discuss the hiatal hernia.  I also think it would be reasonable to decrease the metoprolol to 50 mg twice daily.  Otherwise, she will follow-up with Dr. Morales in 1 year.      As always, it has been a pleasure to participate in your patient's care.      Sincerely,         INOCENTE Aldana

## 2025-06-12 DIAGNOSIS — E78.5 HYPERLIPIDEMIA, UNSPECIFIED HYPERLIPIDEMIA TYPE: ICD-10-CM

## 2025-06-12 RX ORDER — ATORVASTATIN CALCIUM 80 MG/1
80 TABLET, FILM COATED ORAL DAILY
Qty: 90 TABLET | Refills: 3 | Status: SHIPPED | OUTPATIENT
Start: 2025-06-12

## 2025-06-25 ENCOUNTER — OFFICE VISIT (OUTPATIENT)
Dept: FAMILY MEDICINE CLINIC | Facility: CLINIC | Age: 65
End: 2025-06-25
Payer: COMMERCIAL

## 2025-06-25 VITALS
HEART RATE: 62 BPM | HEIGHT: 59 IN | OXYGEN SATURATION: 98 % | TEMPERATURE: 97.9 F | BODY MASS INDEX: 45.76 KG/M2 | WEIGHT: 227 LBS | SYSTOLIC BLOOD PRESSURE: 130 MMHG | DIASTOLIC BLOOD PRESSURE: 80 MMHG

## 2025-06-25 DIAGNOSIS — M79.10 MYALGIA: ICD-10-CM

## 2025-06-25 DIAGNOSIS — E11.59 TYPE 2 DIABETES MELLITUS WITH OTHER CIRCULATORY COMPLICATION, WITHOUT LONG-TERM CURRENT USE OF INSULIN: ICD-10-CM

## 2025-06-25 DIAGNOSIS — I25.10 CORONARY ARTERY DISEASE INVOLVING NATIVE CORONARY ARTERY OF NATIVE HEART WITHOUT ANGINA PECTORIS: ICD-10-CM

## 2025-06-25 DIAGNOSIS — E78.5 HYPERLIPIDEMIA, UNSPECIFIED HYPERLIPIDEMIA TYPE: ICD-10-CM

## 2025-06-25 DIAGNOSIS — I10 PRIMARY HYPERTENSION: Primary | ICD-10-CM

## 2025-06-25 PROCEDURE — 99214 OFFICE O/P EST MOD 30 MIN: CPT | Performed by: INTERNAL MEDICINE

## 2025-06-25 NOTE — PROGRESS NOTES
"Subjective   Kimberli Magallanes is a 64 y.o. female.     Chief Complaint   Patient presents with    Diabetes    Leg Swelling     States that this has been going on since she has been on Amlodipine and states wants an \"exit\" plan on getting off this medication        History of Present Illness   Kimberli Magallanes, -1960, is a 63 y.o. female who presents for an annual physical.    63 year old female with diabetes, hypertension, hyperlipidemia, CAD s/p NSTEMI with subsequent PTCA, hiatal hernia, and GERD.     Has swelling in the distal legs that is absent in the morning but swollen bad by evening.  Hurts all over every day and does not want to be on higher doses of the statin with concerns.    History of hypertension.  Currently, has been feeling well and asymptomatic without any headaches,vision changes, cough, chest pain, shortness of breath, swelling, focal neurologic deficit, memory loss or syncope.  Has been taking the medications regularly and adherent with the regimen of amlodipine 10 mg, metoprolol 50 mg BID. Denies medication side effects and no significant interval events.       History of high cholesterol.  Currently, has been feeling well without any myalgias, muscle aches, weakness, numbness, chest pain, short of breath or other issues.  Currently, is adherent with medication regimen of atorvastatin 40 mg (1/2 tab of 80) and denies medication side effects. Is due for lab follow-up. Last LDL of 69 on  4/10/24.     History of diabetes.   Patient states to have been compliant with medications and trying to exercise more.  Blood sugar monitoring - patient states has been running on average 120.  With a range of  the last 7 days with her new glucometer but was down to 60's nearly daily in the afternoon and felt light headed and near black out (drinks OJ and improves but then GERD issues).  No episodes of nausea, vomiting, new rashes, syncope or other issues.  Currently, on glipizide XL 5 mg, pioglitazone 15 " mg, and metformin XR 1000 mg twice a day.  Last A1c 7/12/2024 of 6.3%.  Last microalbumin on 4/10/2024 and normal.       The following portions of the patient's history were reviewed and updated as appropriate: allergies, current medications, past family history, past medical history, past social history, past surgical history and problem list.    Depression Screen:      6/25/2025     8:28 AM   PHQ-2/PHQ-9 Depression Screening   Little interest or pleasure in doing things Not at all   Feeling down, depressed, or hopeless Not at all   How difficult have these problems made it for you to do your work, take care of things at home, or get along with other people? Not difficult at all       Past Medical History:   Diagnosis Date    Abnormal electrocardiogram     Adverse reaction to ACE inhibitor drug     Adverse reaction to angiotensin 2 receptor antagonist     BMI 40.0-44.9, adult     Coronary artery disease 01/15/2020    Stent was placed.    Diabetes mellitus     Diet Controlled    Encounter for annual health examination     Encounter for hepatitis C screening test for low risk patient     Esophageal reflux     Foot deformity, acquired, right     Glucosuria     Hematuria, microscopic     Hyperlipidemia     Hypertension     Refused influenza vaccine     Visit for screening mammogram        Past Surgical History:   Procedure Laterality Date    BREAST BIOPSY Left 02/14/2019    benign calcification    CARDIAC CATHETERIZATION      x1 stent LAD at [facility]    COLONOSCOPY  approx 2016    negative per pt     CORONARY STENT PLACEMENT  01/15/2020    ENDOSCOPY  2021    Dr. Martínez       Family History   Problem Relation Age of Onset    Heart attack Mother 67    Stroke Mother     Hypertension Mother     Heart disease Mother 67    Arthritis Mother     Depression Mother     Diabetes Father     Hypertension Father     Heart disease Father 58    Alcohol abuse Father     Heart attack Maternal Grandmother     Cancer Maternal  Grandmother     No Known Problems Other     Breast cancer Neg Hx        Social History     Socioeconomic History    Marital status:    Tobacco Use    Smoking status: Former     Current packs/day: 0.00     Average packs/day: 0.3 packs/day for 32.4 years (8.1 ttl pk-yrs)     Types: Cigarettes     Start date: 1979     Quit date: 2012     Years since quittin.4     Passive exposure: Past    Smokeless tobacco: Never    Tobacco comments:     I quite in , then started agin, then quite but finally stopped in .   Vaping Use    Vaping status: Never Used   Substance and Sexual Activity    Alcohol use: Yes     Alcohol/week: 0.0 - 2.0 standard drinks of alcohol     Comment: social    Drug use: No    Sexual activity: Not Currently     Birth control/protection: Post-menopausal       Current Outpatient Medications   Medication Sig Dispense Refill    amLODIPine (NORVASC) 10 MG tablet TAKE 1 TABLET DAILY 90 tablet 3    aspirin 81 MG EC tablet Take 1 tablet by mouth Daily.      atorvastatin (LIPITOR) 80 MG tablet TAKE 1 TABLET DAILY (Patient taking differently: Take 1 tablet by mouth Daily. Taking half) 90 tablet 3    famotidine (PEPCID) 20 MG tablet Take 1 tablet by mouth 2 (Two) Times a Day As Needed for Heartburn. 180 tablet 1    glipizide (GLUCOTROL XL) 5 MG ER tablet TAKE 1 TABLET DAILY 90 tablet 3    Magnesium 250 MG tablet Take  by mouth.      metFORMIN ER (GLUCOPHAGE-XR) 500 MG 24 hr tablet TAKE 1 TABLET TWICE A  tablet 3    metoprolol tartrate (LOPRESSOR) 50 MG tablet TAKE 1 TABLET TWICE A  tablet 1    multivitamin with minerals tablet tablet Take 1 tablet by mouth Daily.      nitroglycerin (NITROSTAT) 0.4 MG SL tablet Place 1 tablet under the tongue Every 5 (Five) Minutes As Needed for Chest Pain. 30 tablet 2    pantoprazole (PROTONIX) 40 MG EC tablet TAKE 1 TABLET DAILY 90 tablet 1    pioglitazone (ACTOS) 15 MG tablet TAKE 1 TABLET DAILY 90 tablet 3     No current  "facility-administered medications for this visit.       Review of Systems   Constitutional:  Negative for activity change, appetite change, fatigue, fever, unexpected weight gain and unexpected weight loss.   HENT:  Negative for nosebleeds, rhinorrhea, trouble swallowing and voice change.    Eyes:  Negative for visual disturbance.   Respiratory:  Negative for cough, chest tightness, shortness of breath and wheezing.    Cardiovascular:  Positive for leg swelling. Negative for chest pain and palpitations.   Gastrointestinal:  Positive for GERD. Negative for abdominal pain, blood in stool, constipation, diarrhea, nausea, vomiting and indigestion.   Genitourinary:  Negative for dysuria, frequency and hematuria.   Musculoskeletal:  Negative for arthralgias, back pain and myalgias.        Generalized body aches   Skin:  Negative for rash and wound.   Neurological:  Negative for dizziness, tremors, weakness, light-headedness, numbness, headache and memory problem.   Hematological:  Negative for adenopathy. Does not bruise/bleed easily.   Psychiatric/Behavioral:  Negative for sleep disturbance and depressed mood. The patient is not nervous/anxious.        Objective   /80 (BP Location: Left arm, Patient Position: Sitting, Cuff Size: Large Adult)   Pulse 62   Temp 97.9 °F (36.6 °C) (Temporal)   Ht 149.9 cm (59.02\")   Wt 103 kg (227 lb)   LMP  (LMP Unknown)   SpO2 98%   BMI 45.82 kg/m²     Physical Exam  Vitals and nursing note reviewed.   Constitutional:       General: She is not in acute distress.     Appearance: She is well-developed. She is obese. She is not diaphoretic.   HENT:      Head: Normocephalic and atraumatic.      Right Ear: External ear normal.      Left Ear: External ear normal.      Nose: Nose normal.   Eyes:      Conjunctiva/sclera: Conjunctivae normal.      Pupils: Pupils are equal, round, and reactive to light.   Neck:      Thyroid: No thyromegaly.      Trachea: No tracheal deviation. "   Cardiovascular:      Rate and Rhythm: Normal rate and regular rhythm.      Heart sounds: Normal heart sounds. No murmur heard.     No friction rub. No gallop.   Pulmonary:      Effort: Pulmonary effort is normal. No respiratory distress.      Breath sounds: Normal breath sounds.   Abdominal:      General: Bowel sounds are normal.      Palpations: Abdomen is soft. There is no mass.      Tenderness: There is no abdominal tenderness. There is no guarding.   Musculoskeletal:         General: Normal range of motion.      Cervical back: Normal range of motion and neck supple.   Lymphadenopathy:      Cervical: No cervical adenopathy.   Skin:     General: Skin is warm and dry.      Capillary Refill: Capillary refill takes less than 2 seconds.      Findings: No rash.   Neurological:      Mental Status: She is alert and oriented to person, place, and time.      Motor: No abnormal muscle tone.      Deep Tendon Reflexes: Reflexes normal.   Psychiatric:         Behavior: Behavior normal.         Thought Content: Thought content normal.         Judgment: Judgment normal.         No results found for this or any previous visit (from the past 12 weeks).  Assessment & Plan   Diagnoses and all orders for this visit:    1. Primary hypertension (Primary)  -     Comprehensive Metabolic Panel  -     Lipid Panel    2. Hyperlipidemia, unspecified hyperlipidemia type  -     Comprehensive Metabolic Panel  -     Lipid Panel    3. Myalgia  -     CK    4. Type 2 diabetes mellitus with other circulatory complication, without long-term current use of insulin  -     Comprehensive Metabolic Panel  -     Lipid Panel  -     Hemoglobin A1c  -     Microalbumin / Creatinine Urine Ratio - Urine, Clean Catch  -     CK    5. Coronary artery disease involving native coronary artery of native heart without angina pectoris      Discussed the swelling and may be due to medications such as the amlodipine or pioglitazone.  Will stop the pioglitazone first and  check the labs as ordered including renal testing.  May consider weaning off the amlodipine in the future and consider HCTZ in future.  Continue to monitor BS at home.  Myalgia may be secondary to statin.  Check the CK level and consider try changing the atorvastatin to rosuvastatin.  May consider Repatha for cholesterol in CAD patient.         Dictated utilizing Dragon Dictation

## 2025-06-26 LAB
ALBUMIN SERPL-MCNC: 4.5 G/DL (ref 3.5–5.2)
ALBUMIN/CREAT UR: <4 MG/G CREAT (ref 0–29)
ALBUMIN/GLOB SERPL: 1.5 G/DL
ALP SERPL-CCNC: 101 U/L (ref 39–117)
ALT SERPL-CCNC: 20 U/L (ref 1–33)
AST SERPL-CCNC: 19 U/L (ref 1–32)
BILIRUB SERPL-MCNC: 0.3 MG/DL (ref 0–1.2)
BUN SERPL-MCNC: 13 MG/DL (ref 8–23)
BUN/CREAT SERPL: 14.1 (ref 7–25)
CALCIUM SERPL-MCNC: 9.9 MG/DL (ref 8.6–10.5)
CHLORIDE SERPL-SCNC: 108 MMOL/L (ref 98–107)
CHOLEST SERPL-MCNC: 154 MG/DL (ref 0–200)
CK SERPL-CCNC: 71 U/L (ref 20–180)
CO2 SERPL-SCNC: 22.7 MMOL/L (ref 22–29)
CREAT SERPL-MCNC: 0.92 MG/DL (ref 0.57–1)
CREAT UR-MCNC: 68 MG/DL
EGFRCR SERPLBLD CKD-EPI 2021: 70.1 ML/MIN/1.73
GLOBULIN SER CALC-MCNC: 3 GM/DL
GLUCOSE SERPL-MCNC: 89 MG/DL (ref 65–99)
HBA1C MFR BLD: 6.6 % (ref 4.8–5.6)
HDLC SERPL-MCNC: 67 MG/DL (ref 40–60)
LDLC SERPL CALC-MCNC: 70 MG/DL (ref 0–100)
MICROALBUMIN UR-MCNC: <3 UG/ML
POTASSIUM SERPL-SCNC: 4.6 MMOL/L (ref 3.5–5.2)
PROT SERPL-MCNC: 7.5 G/DL (ref 6–8.5)
SODIUM SERPL-SCNC: 143 MMOL/L (ref 136–145)
TRIGL SERPL-MCNC: 93 MG/DL (ref 0–150)
VLDLC SERPL CALC-MCNC: 17 MG/DL (ref 5–40)

## 2025-07-01 ENCOUNTER — TELEPHONE (OUTPATIENT)
Age: 65
End: 2025-07-01
Payer: COMMERCIAL

## 2025-07-01 NOTE — TELEPHONE ENCOUNTER
Called Kimberli Magallanes for additional information.    Patient reports she has been dealing with the muscle aches for a while now.  She states she mentioned it at her PCP visit in November and her PCP encouraged her to become more active and see if her symptoms persist.  She reports despite trying PCP's suggestions, her symptoms persisted which is why PCP checked her muscle enzymes at this last visit.     Patient is agreeable to hold atorvastatin for 2 weeks and will call office in two weeks as requested to provided a symptom update.    Thank you,  Yashira WINTERS RN  Triage Nurse NATANAEL  07/01/25 16:19 EDT

## 2025-07-01 NOTE — TELEPHONE ENCOUNTER
Please let her know I reviewed her recent lipid panel.  Her LDL is 70 which is still not at the goal of less than 55.  Has she been taking the 80 mg of atorvastatin?  If so, would recommend adding Zetia to her regimen.

## 2025-07-01 NOTE — TELEPHONE ENCOUNTER
Are the muscle aches a new development? She should hold the statin for 2 weeks and see if this improves. She should keep us updated.

## 2025-07-01 NOTE — TELEPHONE ENCOUNTER
Kimberli Magallanes returned call.  Reviewed results with her and she verbalized understanding of results.    Patient states [facility] just sent the increased dosage of atorvastatin 80 mg daily to her and she has not started this dosage yet. Patient reports she recently saw PCP who checked her muscle enzymes as she is experiencing muscle aches.  She wanted INOCENTE Austin to be aware this lab was checked.    Patient is currently at work and states it's ok to leave detailed voicemail upon return call.    Please let me know how you would like to proceed.    Thank you,  Yashira WINTERS RN  Triage Nurse [facility]  07/01/25  11:09 EDT

## 2025-07-01 NOTE — TELEPHONE ENCOUNTER
Called and left VM, will continue to try to reach pt.    HUB- please put patient straight through to triage    Karla Storm, RN  Triage RN  07/01/25 10:31 EDT

## 2025-07-07 DIAGNOSIS — I10 ESSENTIAL HYPERTENSION: ICD-10-CM

## 2025-07-07 RX ORDER — METOPROLOL TARTRATE 50 MG
50 TABLET ORAL 2 TIMES DAILY
Qty: 180 TABLET | Refills: 3 | Status: SHIPPED | OUTPATIENT
Start: 2025-07-07

## 2025-07-07 RX ORDER — METOPROLOL TARTRATE 25 MG/1
25 TABLET, FILM COATED ORAL 2 TIMES DAILY
Qty: 180 TABLET | Refills: 3 | OUTPATIENT
Start: 2025-07-07

## 2025-07-28 DIAGNOSIS — K21.9 GASTROESOPHAGEAL REFLUX DISEASE WITHOUT ESOPHAGITIS: ICD-10-CM

## 2025-07-28 RX ORDER — PANTOPRAZOLE SODIUM 40 MG/1
40 TABLET, DELAYED RELEASE ORAL DAILY
Qty: 90 TABLET | Refills: 0 | Status: SHIPPED | OUTPATIENT
Start: 2025-07-28